# Patient Record
Sex: FEMALE | Race: WHITE | NOT HISPANIC OR LATINO | Employment: OTHER | URBAN - METROPOLITAN AREA
[De-identification: names, ages, dates, MRNs, and addresses within clinical notes are randomized per-mention and may not be internally consistent; named-entity substitution may affect disease eponyms.]

---

## 2017-01-04 ENCOUNTER — TELEPHONE (OUTPATIENT)
Dept: ONCOLOGY | Facility: CLINIC | Age: 57
End: 2017-01-04

## 2017-01-04 NOTE — TELEPHONE ENCOUNTER
Pt's daughter called to ask about a TAR 3 application for her mother.  They also need a SNAPCARD financial packet for help with medical bills.  I added a social work appt. On 1/13 to help with these applications.  The patient's daughter said they also need colostomy supplies.  She should call home health or call our Triage nurses for information about these.

## 2017-01-11 ENCOUNTER — APPOINTMENT (OUTPATIENT)
Dept: GENERAL RADIOLOGY | Facility: HOSPITAL | Age: 57
End: 2017-01-11

## 2017-01-11 ENCOUNTER — ANESTHESIA (OUTPATIENT)
Dept: PERIOP | Facility: HOSPITAL | Age: 57
End: 2017-01-11

## 2017-01-11 ENCOUNTER — ANESTHESIA EVENT (OUTPATIENT)
Dept: PERIOP | Facility: HOSPITAL | Age: 57
End: 2017-01-11

## 2017-01-11 PROCEDURE — 25010000002 PROPOFOL 10 MG/ML EMULSION: Performed by: NURSE ANESTHETIST, CERTIFIED REGISTERED

## 2017-01-11 PROCEDURE — 25010000003 CEFAZOLIN IN DEXTROSE 2-4 GM/100ML-% SOLUTION: Performed by: SURGERY

## 2017-01-11 PROCEDURE — 25010000002 DEXAMETHASONE PER 1 MG: Performed by: NURSE ANESTHETIST, CERTIFIED REGISTERED

## 2017-01-11 PROCEDURE — 77001 FLUOROGUIDE FOR VEIN DEVICE: CPT

## 2017-01-11 PROCEDURE — 25010000002 FENTANYL CITRATE (PF) 100 MCG/2ML SOLUTION: Performed by: NURSE ANESTHETIST, CERTIFIED REGISTERED

## 2017-01-11 RX ORDER — PROPOFOL 10 MG/ML
VIAL (ML) INTRAVENOUS AS NEEDED
Status: DISCONTINUED | OUTPATIENT
Start: 2017-01-11 | End: 2017-01-11 | Stop reason: SURG

## 2017-01-11 RX ORDER — DEXAMETHASONE SODIUM PHOSPHATE 10 MG/ML
INJECTION INTRAMUSCULAR; INTRAVENOUS AS NEEDED
Status: DISCONTINUED | OUTPATIENT
Start: 2017-01-11 | End: 2017-01-11 | Stop reason: SURG

## 2017-01-11 RX ORDER — LIDOCAINE HYDROCHLORIDE 20 MG/ML
INJECTION, SOLUTION INFILTRATION; PERINEURAL AS NEEDED
Status: DISCONTINUED | OUTPATIENT
Start: 2017-01-11 | End: 2017-01-11 | Stop reason: SURG

## 2017-01-11 RX ORDER — FENTANYL CITRATE 50 UG/ML
INJECTION, SOLUTION INTRAMUSCULAR; INTRAVENOUS AS NEEDED
Status: DISCONTINUED | OUTPATIENT
Start: 2017-01-11 | End: 2017-01-11 | Stop reason: SURG

## 2017-01-11 RX ADMIN — FENTANYL CITRATE 50 MCG: 50 INJECTION INTRAMUSCULAR; INTRAVENOUS at 11:11

## 2017-01-11 RX ADMIN — LIDOCAINE HYDROCHLORIDE 60 MG: 20 INJECTION, SOLUTION INFILTRATION; PERINEURAL at 11:11

## 2017-01-11 RX ADMIN — DEXAMETHASONE SODIUM PHOSPHATE 8 MG: 10 INJECTION INTRAMUSCULAR; INTRAVENOUS at 11:15

## 2017-01-11 RX ADMIN — PROPOFOL 200 MG: 10 INJECTION, EMULSION INTRAVENOUS at 11:11

## 2017-01-11 RX ADMIN — CEFAZOLIN SODIUM 2 G: 2 INJECTION, SOLUTION INTRAVENOUS at 11:05

## 2017-01-11 NOTE — ANESTHESIA PREPROCEDURE EVALUATION
Anesthesia Evaluation     Patient summary reviewed    Airway   Mallampati: I  no difficulty expected  Dental      Pulmonary    Cardiovascular   (+) hypertension,     Rhythm: irregular  Rate: normal    Neuro/Psych  GI/Hepatic/Renal/Endo      Musculoskeletal     Abdominal    Substance History      OB/GYN          Other         Other Comment: Hb 10.3                        Anesthesia Plan    ASA 3     MAC     intravenous induction   Anesthetic plan and risks discussed with patient.  Use of blood products discussed with patient .   limited english

## 2017-01-11 NOTE — ANESTHESIA POSTPROCEDURE EVALUATION
Patient: Benigno Sisic    Procedure Summary     Date Anesthesia Start Anesthesia Stop Room / Location    01/11/17 1105 1205  [facility] / [facility]       Procedure Diagnosis Surgeon Provider    INSERTION VENOUS ACCESS DEVICE (N/A ) Rectal cancer metastasized to lung  (Rectal cancer metastasized to lung [C20, C78.00]) MD Macho Mcnair Jr., MD          Anesthesia Type: MAC  Last vitals  /81 (01/11/17 1330)    Temp      Pulse 81 (01/11/17 1330)   Resp 16 (01/11/17 1330)    SpO2 98 % (01/11/17 1330)      Post Anesthesia Care and Evaluation    Patient location during evaluation: PHASE II  Patient participation: complete - patient participated  Level of consciousness: awake  Pain score: 1  Pain management: adequate  Airway patency: patent  Anesthetic complications: No anesthetic complications  Cardiovascular status: acceptable  Respiratory status: acceptable  Hydration status: acceptable

## 2017-01-12 DIAGNOSIS — C20 MALIGNANT NEOPLASM OF RECTUM (HCC): Primary | ICD-10-CM

## 2017-01-12 NOTE — PROGRESS NOTES
Subjective       PATIENT NAME:  Benigno Quispe  YOB: 1960  PATIENT'S SEX:  female    PATIENT'S ADDRESS:  [address] [place], [place] [id number]  PROVIDER:  Jude Stewart MD    Encounter Diagnosis   Name Primary?   • Malignant neoplasm of rectum Yes     No Known Allergies      [facility] INFUSION ORDERS    DATE      1/13/2017    Continuous 5FU (Fluorouracil) infusion    Dose         2125 mg /     23 hrs    Total Dose      4250 mg /      46 hrs    Repeat every  2 weeks. Total of 12 cycles currently ordered.      Noy Gutierrez RN     Electronically Signed By: Jude Stewart MD    January 12, 2017  12:52 PM

## 2017-01-13 ENCOUNTER — DOCUMENTATION (OUTPATIENT)
Dept: ONCOLOGY | Facility: CLINIC | Age: 57
End: 2017-01-13

## 2017-01-13 ENCOUNTER — OFFICE VISIT (OUTPATIENT)
Dept: ONCOLOGY | Facility: CLINIC | Age: 57
End: 2017-01-13

## 2017-01-13 ENCOUNTER — APPOINTMENT (OUTPATIENT)
Dept: ONCOLOGY | Facility: HOSPITAL | Age: 57
End: 2017-01-13

## 2017-01-13 ENCOUNTER — TELEPHONE (OUTPATIENT)
Dept: ONCOLOGY | Facility: CLINIC | Age: 57
End: 2017-01-13

## 2017-01-13 ENCOUNTER — LAB (OUTPATIENT)
Dept: LAB | Facility: HOSPITAL | Age: 57
End: 2017-01-13

## 2017-01-13 ENCOUNTER — APPOINTMENT (OUTPATIENT)
Dept: ONCOLOGY | Facility: CLINIC | Age: 57
End: 2017-01-13

## 2017-01-13 VITALS
DIASTOLIC BLOOD PRESSURE: 80 MMHG | HEART RATE: 72 BPM | TEMPERATURE: 98.8 F | SYSTOLIC BLOOD PRESSURE: 112 MMHG | WEIGHT: 160.2 LBS | RESPIRATION RATE: 16 BRPM | HEIGHT: 64 IN | BODY MASS INDEX: 27.35 KG/M2

## 2017-01-13 DIAGNOSIS — C20 MALIGNANT NEOPLASM OF RECTUM (HCC): Primary | ICD-10-CM

## 2017-01-13 DIAGNOSIS — C20 MALIGNANT NEOPLASM OF RECTUM (HCC): ICD-10-CM

## 2017-01-13 LAB
BASOPHILS # BLD AUTO: 0.02 10*3/MM3 (ref 0–0.1)
BASOPHILS NFR BLD AUTO: 0.4 % (ref 0–1.1)
BILIRUB UR QL STRIP: NEGATIVE
CLARITY UR: CLEAR
COLOR UR: YELLOW
DEPRECATED RDW RBC AUTO: 50.6 FL (ref 37–49)
EOSINOPHIL # BLD AUTO: 0.03 10*3/MM3 (ref 0–0.36)
EOSINOPHIL NFR BLD AUTO: 0.6 % (ref 1–5)
ERYTHROCYTE [DISTWIDTH] IN BLOOD BY AUTOMATED COUNT: 17.4 % (ref 11.7–14.5)
GLUCOSE UR STRIP-MCNC: NEGATIVE MG/DL
HCT VFR BLD AUTO: 36.9 % (ref 34–45)
HGB BLD-MCNC: 11.6 G/DL (ref 11.5–14.9)
HGB UR QL STRIP.AUTO: NEGATIVE
IMM GRANULOCYTES # BLD: 0.04 10*3/MM3 (ref 0–0.03)
IMM GRANULOCYTES NFR BLD: 0.9 % (ref 0–0.5)
KETONES UR QL STRIP: ABNORMAL
LEUKOCYTE ESTERASE UR QL STRIP.AUTO: NEGATIVE
LYMPHOCYTES # BLD AUTO: 2.23 10*3/MM3 (ref 1–3.5)
LYMPHOCYTES NFR BLD AUTO: 48.1 % (ref 20–49)
MCH RBC QN AUTO: 25.4 PG (ref 27–33)
MCHC RBC AUTO-ENTMCNC: 31.4 G/DL (ref 32–35)
MCV RBC AUTO: 80.9 FL (ref 83–97)
MONOCYTES # BLD AUTO: 0.42 10*3/MM3 (ref 0.25–0.8)
MONOCYTES NFR BLD AUTO: 9.1 % (ref 4–12)
NEUTROPHILS # BLD AUTO: 1.9 10*3/MM3 (ref 1.5–7)
NEUTROPHILS NFR BLD AUTO: 40.9 % (ref 39–75)
NITRITE UR QL STRIP: NEGATIVE
NRBC BLD MANUAL-RTO: 0 /100 WBC (ref 0–0)
PH UR STRIP.AUTO: 6 [PH] (ref 4.5–8)
PLATELET # BLD AUTO: 181 10*3/MM3 (ref 150–375)
PMV BLD AUTO: 9.2 FL (ref 8.9–12.1)
PROT UR QL STRIP: NEGATIVE
RBC # BLD AUTO: 4.56 10*6/MM3 (ref 3.9–5)
SP GR UR STRIP: 1.02 (ref 1–1.03)
UROBILINOGEN UR QL STRIP: ABNORMAL
WBC NRBC COR # BLD: 4.64 10*3/MM3 (ref 4–10)

## 2017-01-13 PROCEDURE — 85025 COMPLETE CBC W/AUTO DIFF WBC: CPT | Performed by: INTERNAL MEDICINE

## 2017-01-13 PROCEDURE — 36415 COLL VENOUS BLD VENIPUNCTURE: CPT | Performed by: INTERNAL MEDICINE

## 2017-01-13 PROCEDURE — 81003 URINALYSIS AUTO W/O SCOPE: CPT | Performed by: INTERNAL MEDICINE

## 2017-01-13 PROCEDURE — 99215 OFFICE O/P EST HI 40 MIN: CPT | Performed by: INTERNAL MEDICINE

## 2017-01-13 RX ORDER — FLUOROURACIL 50 MG/ML
400 INJECTION, SOLUTION INTRAVENOUS ONCE
Status: CANCELLED | OUTPATIENT
Start: 2017-02-10

## 2017-01-13 RX ORDER — SODIUM CHLORIDE 9 MG/ML
250 INJECTION, SOLUTION INTRAVENOUS ONCE
Status: CANCELLED | OUTPATIENT
Start: 2017-02-10

## 2017-01-13 RX ORDER — HYDROCODONE BITARTRATE AND ACETAMINOPHEN 5; 325 MG/1; MG/1
2 TABLET ORAL EVERY 6 HOURS PRN
Qty: 60 TABLET | Refills: 0 | Status: SHIPPED | OUTPATIENT
Start: 2017-01-13 | End: 2017-01-30 | Stop reason: SDUPTHER

## 2017-01-13 RX ORDER — PALONOSETRON 0.05 MG/ML
0.25 INJECTION, SOLUTION INTRAVENOUS ONCE
Status: CANCELLED | OUTPATIENT
Start: 2017-02-10

## 2017-01-13 RX ORDER — ATROPINE SULFATE 1 MG/ML
0.25 INJECTION, SOLUTION INTRAMUSCULAR; INTRAVENOUS; SUBCUTANEOUS
Status: CANCELLED | OUTPATIENT
Start: 2017-02-10

## 2017-01-13 NOTE — PROGRESS NOTES
Benigno Quispe was seen for a social work appointment today.  [facility]  had come in to see Dr. Stewart and hear his recommendations for continuing treatment of her metastatic cancer.  The patient's English- speaking son had come with her but was in the waiting room. We called him back into the examining room to hear the instructions for filling out the E paperwork to apply for a discount in [facility] bills.  Kevin appears to be 19 or 20 and speaks unaccented English.  I explained the [facility] paperwork.  Kevin and his sister who is working this morning, (She is an RN) will help his Mom fill this out.  His mom lives alone.  Her source of income is her Social Security Disability check.    We then filled out the application for [facility] 3 which was signed by the patient and Dr. Stewart and faxed to [facility], with a request to fast-track , hopefully to be approved by the time chemotherapy begins in 2 weeks. Kevin can ride with the patient to treatment but must pay $3.00/trip. The patient pays $3.00 per trip also.  Kevin can fill out the arcelia as a  and get free trips if approved. I called Kevin and relayed the above info.      I gave the patient and her son support group information.  They have contact information for Ariana Veras LCSW and me and should feel free to call us.

## 2017-01-13 NOTE — TELEPHONE ENCOUNTER
Pt's daughter called to answer questions on the [facility] application.  I had already talked to her brother and sent the arcelia to [facility].  I told her Dr. Stewart would like to talk to her, can she accompany her Mom on her next appt here?  She asked when the appointments are, thinks she can ask off for the chemotherapy ed appt on 1/18 and the 2/10 appt with Dr. Stewart.

## 2017-01-18 ENCOUNTER — OFFICE VISIT (OUTPATIENT)
Dept: ONCOLOGY | Facility: CLINIC | Age: 57
End: 2017-01-18

## 2017-01-18 ENCOUNTER — APPOINTMENT (OUTPATIENT)
Dept: LAB | Facility: HOSPITAL | Age: 57
End: 2017-01-18

## 2017-01-18 VITALS — WEIGHT: 153.3 LBS | BODY MASS INDEX: 26.5 KG/M2

## 2017-01-18 DIAGNOSIS — C20 MALIGNANT NEOPLASM OF RECTUM (HCC): ICD-10-CM

## 2017-01-18 DIAGNOSIS — C20 MALIGNANT NEOPLASM OF RECTUM (HCC): Primary | ICD-10-CM

## 2017-01-18 PROCEDURE — 99215 OFFICE O/P EST HI 40 MIN: CPT | Performed by: NURSE PRACTITIONER

## 2017-01-18 PROCEDURE — G0463 HOSPITAL OUTPT CLINIC VISIT: HCPCS | Performed by: NURSE PRACTITIONER

## 2017-01-18 RX ORDER — ONDANSETRON HYDROCHLORIDE 8 MG/1
8 TABLET, FILM COATED ORAL EVERY 8 HOURS PRN
Qty: 30 TABLET | Refills: 5 | Status: SHIPPED | OUTPATIENT
Start: 2017-01-18 | End: 2017-09-25 | Stop reason: SDUPTHER

## 2017-01-24 DIAGNOSIS — C20 MALIGNANT NEOPLASM OF RECTUM (HCC): ICD-10-CM

## 2017-01-26 DIAGNOSIS — C20 MALIGNANT NEOPLASM OF RECTUM (HCC): ICD-10-CM

## 2017-01-27 ENCOUNTER — INFUSION (OUTPATIENT)
Dept: ONCOLOGY | Facility: HOSPITAL | Age: 57
End: 2017-01-27

## 2017-01-27 VITALS
WEIGHT: 155 LBS | BODY MASS INDEX: 26.79 KG/M2 | HEART RATE: 98 BPM | DIASTOLIC BLOOD PRESSURE: 84 MMHG | SYSTOLIC BLOOD PRESSURE: 133 MMHG | TEMPERATURE: 98.6 F

## 2017-01-27 DIAGNOSIS — C20 MALIGNANT NEOPLASM OF RECTUM (HCC): ICD-10-CM

## 2017-01-27 LAB
ALBUMIN SERPL-MCNC: 4.1 G/DL (ref 3.5–5.2)
ALBUMIN/GLOB SERPL: 1.2 G/DL (ref 1.1–2.4)
ALP SERPL-CCNC: 54 U/L (ref 38–116)
ALT SERPL W P-5'-P-CCNC: 23 U/L (ref 0–33)
ANION GAP SERPL CALCULATED.3IONS-SCNC: 11.5 MMOL/L
AST SERPL-CCNC: 31 U/L (ref 0–32)
BASOPHILS # BLD AUTO: 0.02 10*3/MM3 (ref 0–0.1)
BASOPHILS NFR BLD AUTO: 0.5 % (ref 0–1.1)
BILIRUB SERPL-MCNC: 0.7 MG/DL (ref 0.1–1.2)
BUN BLD-MCNC: 12 MG/DL (ref 6–20)
BUN/CREAT SERPL: 17.6 (ref 7.3–30)
CALCIUM SPEC-SCNC: 9.7 MG/DL (ref 8.5–10.2)
CHLORIDE SERPL-SCNC: 100 MMOL/L (ref 98–107)
CO2 SERPL-SCNC: 29.5 MMOL/L (ref 22–29)
CREAT BLD-MCNC: 0.68 MG/DL (ref 0.6–1.1)
DEPRECATED RDW RBC AUTO: 45.6 FL (ref 37–49)
EOSINOPHIL # BLD AUTO: 0.06 10*3/MM3 (ref 0–0.36)
EOSINOPHIL NFR BLD AUTO: 1.6 % (ref 1–5)
ERYTHROCYTE [DISTWIDTH] IN BLOOD BY AUTOMATED COUNT: 15.6 % (ref 11.7–14.5)
GFR SERPL CREATININE-BSD FRML MDRD: 108 ML/MIN/1.73
GFR SERPL CREATININE-BSD FRML MDRD: 90 ML/MIN/1.73
GLOBULIN UR ELPH-MCNC: 3.3 GM/DL (ref 1.8–3.5)
GLUCOSE BLD-MCNC: 99 MG/DL (ref 74–124)
HCT VFR BLD AUTO: 39.3 % (ref 34–45)
HGB BLD-MCNC: 12.3 G/DL (ref 11.5–14.9)
IMM GRANULOCYTES # BLD: 0 10*3/MM3 (ref 0–0.03)
IMM GRANULOCYTES NFR BLD: 0 % (ref 0–0.5)
LYMPHOCYTES # BLD AUTO: 2.11 10*3/MM3 (ref 1–3.5)
LYMPHOCYTES NFR BLD AUTO: 57.5 % (ref 20–49)
MCH RBC QN AUTO: 25.3 PG (ref 27–33)
MCHC RBC AUTO-ENTMCNC: 31.3 G/DL (ref 32–35)
MCV RBC AUTO: 80.7 FL (ref 83–97)
MONOCYTES # BLD AUTO: 0.3 10*3/MM3 (ref 0.25–0.8)
MONOCYTES NFR BLD AUTO: 8.2 % (ref 4–12)
NEUTROPHILS # BLD AUTO: 1.18 10*3/MM3 (ref 1.5–7)
NEUTROPHILS NFR BLD AUTO: 32.2 % (ref 39–75)
NRBC BLD MANUAL-RTO: 0 /100 WBC (ref 0–0)
PLATELET # BLD AUTO: 192 10*3/MM3 (ref 150–375)
PMV BLD AUTO: 8.8 FL (ref 8.9–12.1)
POTASSIUM BLD-SCNC: 4.1 MMOL/L (ref 3.5–4.7)
PROT SERPL-MCNC: 7.4 G/DL (ref 6.3–8)
RBC # BLD AUTO: 4.87 10*6/MM3 (ref 3.9–5)
SODIUM BLD-SCNC: 141 MMOL/L (ref 134–145)
WBC NRBC COR # BLD: 3.67 10*3/MM3 (ref 4–10)

## 2017-01-27 PROCEDURE — 85025 COMPLETE CBC W/AUTO DIFF WBC: CPT

## 2017-01-27 PROCEDURE — 25010000003 HEPARIN LOCK FLUCH PER 10 UNITS: Performed by: INTERNAL MEDICINE

## 2017-01-27 PROCEDURE — 36591 DRAW BLOOD OFF VENOUS DEVICE: CPT

## 2017-01-27 PROCEDURE — 80053 COMPREHEN METABOLIC PANEL: CPT

## 2017-01-27 RX ORDER — HEPARIN SODIUM (PORCINE) LOCK FLUSH IV SOLN 100 UNIT/ML 100 UNIT/ML
500 SOLUTION INTRAVENOUS EVERY 8 HOURS SCHEDULED
Status: ACTIVE | OUTPATIENT
Start: 2017-01-27

## 2017-01-27 RX ADMIN — HEPARIN SODIUM (PORCINE) LOCK FLUSH IV SOLN 100 UNIT/ML 500 UNITS: 100 SOLUTION at 10:24

## 2017-01-27 NOTE — PROGRESS NOTES
Patient comes today, tearful.  Per , patient does not want chemotherapy.  Patients daughter is present today as well.  Patient states via  that she will take chemo if guaranteed to cure cancer.  Explained to patient that she has disease that has progressed from colon to her lungs and that her cancer is not curable, but it is treatable.   Patient does not want to do chemotherapy today.  Anc is low as well, in chemo naive patient.  No c/o infection.      Reviewed with dr la, ok to hold chemo today and d/c to home.  Per dr la, patient can return in 2 weeks.  (patient has metastatic disease, non emergent)    Patient did speak with another individual  in the office today who is currently taking folfiri.  Patient did appear less distressed after speaking with this patient.  She was even smiling.      Patient via in the  states she would prefer to have a female doctor and asked if she could be set up an appt with Dr. Paez, by name.   Reviewed with dr paez, she will see patient.  Set up for 3 unit appt.  andres noted the appt line that patient requests to have Сергей, the interpretor, here for her appt.      Reviewed the above situation with Fani, clinic manager.

## 2017-01-30 ENCOUNTER — TELEPHONE (OUTPATIENT)
Dept: ONCOLOGY | Facility: HOSPITAL | Age: 57
End: 2017-01-30

## 2017-01-30 RX ORDER — HYDROCODONE BITARTRATE AND ACETAMINOPHEN 5; 325 MG/1; MG/1
2 TABLET ORAL EVERY 6 HOURS PRN
Qty: 60 TABLET | Refills: 0 | Status: SHIPPED | OUTPATIENT
Start: 2017-01-30 | End: 2017-02-10 | Stop reason: SDUPTHER

## 2017-01-30 NOTE — TELEPHONE ENCOUNTER
Prescription signed for by Dr. Stewart. Called pt daughter and informed her it was ready for . She V/U.

## 2017-01-30 NOTE — TELEPHONE ENCOUNTER
----- Message from Yuli Che sent at 1/30/2017  2:59 PM EST -----   Pt's daughter is calling for a refill for hydrocodone.  Please call when ready          Darlene  [phone]

## 2017-02-06 DIAGNOSIS — C20 MALIGNANT NEOPLASM OF RECTUM (HCC): ICD-10-CM

## 2017-02-10 ENCOUNTER — INFUSION (OUTPATIENT)
Dept: ONCOLOGY | Facility: HOSPITAL | Age: 57
End: 2017-02-10

## 2017-02-10 ENCOUNTER — OFFICE VISIT (OUTPATIENT)
Dept: ONCOLOGY | Facility: CLINIC | Age: 57
End: 2017-02-10

## 2017-02-10 VITALS
RESPIRATION RATE: 16 BRPM | BODY MASS INDEX: 26.98 KG/M2 | SYSTOLIC BLOOD PRESSURE: 142 MMHG | WEIGHT: 158 LBS | TEMPERATURE: 98.4 F | DIASTOLIC BLOOD PRESSURE: 80 MMHG | HEIGHT: 64 IN | HEART RATE: 90 BPM | OXYGEN SATURATION: 99 %

## 2017-02-10 DIAGNOSIS — C20 MALIGNANT NEOPLASM OF RECTUM (HCC): ICD-10-CM

## 2017-02-10 DIAGNOSIS — D70.9 NEUTROPENIA, UNSPECIFIED TYPE (HCC): ICD-10-CM

## 2017-02-10 DIAGNOSIS — D50.9 IRON DEFICIENCY ANEMIA, UNSPECIFIED IRON DEFICIENCY ANEMIA TYPE: ICD-10-CM

## 2017-02-10 DIAGNOSIS — C20 MALIGNANT NEOPLASM OF RECTUM (HCC): Primary | ICD-10-CM

## 2017-02-10 PROBLEM — D51.9 VITAMIN B12 DEFICIENCY ANEMIA: Status: ACTIVE | Noted: 2017-02-10

## 2017-02-10 LAB
ALBUMIN SERPL-MCNC: 4.1 G/DL (ref 3.5–5.2)
ALBUMIN/GLOB SERPL: 1.4 G/DL (ref 1.1–2.4)
ALP SERPL-CCNC: 51 U/L (ref 38–116)
ALT SERPL W P-5'-P-CCNC: 20 U/L (ref 0–33)
ANION GAP SERPL CALCULATED.3IONS-SCNC: 12.3 MMOL/L
AST SERPL-CCNC: 29 U/L (ref 0–32)
BASOPHILS # BLD AUTO: 0.02 10*3/MM3 (ref 0–0.1)
BASOPHILS NFR BLD AUTO: 0.6 % (ref 0–1.1)
BILIRUB SERPL-MCNC: 0.6 MG/DL (ref 0.1–1.2)
BILIRUB UR QL STRIP: NEGATIVE
BUN BLD-MCNC: 13 MG/DL (ref 6–20)
BUN/CREAT SERPL: 20 (ref 7.3–30)
CALCIUM SPEC-SCNC: 9.5 MG/DL (ref 8.5–10.2)
CHLORIDE SERPL-SCNC: 102 MMOL/L (ref 98–107)
CLARITY UR: CLEAR
CO2 SERPL-SCNC: 26.7 MMOL/L (ref 22–29)
COLOR UR: YELLOW
CREAT BLD-MCNC: 0.65 MG/DL (ref 0.6–1.1)
DEPRECATED RDW RBC AUTO: 43 FL (ref 37–49)
EOSINOPHIL # BLD AUTO: 0.08 10*3/MM3 (ref 0–0.36)
EOSINOPHIL NFR BLD AUTO: 2.3 % (ref 1–5)
ERYTHROCYTE [DISTWIDTH] IN BLOOD BY AUTOMATED COUNT: 15.1 % (ref 11.7–14.5)
FERRITIN SERPL-MCNC: 35.2 NG/ML (ref 11–207)
FOLATE SERPL-MCNC: 11.07 NG/ML (ref 4.78–24.2)
GFR SERPL CREATININE-BSD FRML MDRD: 114 ML/MIN/1.73
GFR SERPL CREATININE-BSD FRML MDRD: 94 ML/MIN/1.73
GLOBULIN UR ELPH-MCNC: 3 GM/DL (ref 1.8–3.5)
GLUCOSE BLD-MCNC: 104 MG/DL (ref 74–124)
GLUCOSE UR STRIP-MCNC: NEGATIVE MG/DL
HCT VFR BLD AUTO: 36.4 % (ref 34–45)
HGB BLD-MCNC: 12.1 G/DL (ref 11.5–14.9)
HGB UR QL STRIP.AUTO: ABNORMAL
IMM GRANULOCYTES # BLD: 0.01 10*3/MM3 (ref 0–0.03)
IMM GRANULOCYTES NFR BLD: 0.3 % (ref 0–0.5)
IRON 24H UR-MRATE: 74 MCG/DL (ref 37–145)
IRON SATN MFR SERPL: 20 % (ref 14–48)
KETONES UR QL STRIP: NEGATIVE
LEUKOCYTE ESTERASE UR QL STRIP.AUTO: ABNORMAL
LYMPHOCYTES # BLD AUTO: 1.89 10*3/MM3 (ref 1–3.5)
LYMPHOCYTES NFR BLD AUTO: 53.8 % (ref 20–49)
MCH RBC QN AUTO: 26.2 PG (ref 27–33)
MCHC RBC AUTO-ENTMCNC: 33.2 G/DL (ref 32–35)
MCV RBC AUTO: 79 FL (ref 83–97)
MONOCYTES # BLD AUTO: 0.33 10*3/MM3 (ref 0.25–0.8)
MONOCYTES NFR BLD AUTO: 9.4 % (ref 4–12)
NEUTROPHILS # BLD AUTO: 1.18 10*3/MM3 (ref 1.5–7)
NEUTROPHILS NFR BLD AUTO: 33.6 % (ref 39–75)
NITRITE UR QL STRIP: NEGATIVE
NRBC BLD MANUAL-RTO: 0 /100 WBC (ref 0–0)
PH UR STRIP.AUTO: 5.5 [PH] (ref 4.5–8)
PLATELET # BLD AUTO: 173 10*3/MM3 (ref 150–375)
PMV BLD AUTO: 8.9 FL (ref 8.9–12.1)
POTASSIUM BLD-SCNC: 3.5 MMOL/L (ref 3.5–4.7)
PROT SERPL-MCNC: 7.1 G/DL (ref 6.3–8)
PROT UR QL STRIP: NEGATIVE
RBC # BLD AUTO: 4.61 10*6/MM3 (ref 3.9–5)
SODIUM BLD-SCNC: 141 MMOL/L (ref 134–145)
SP GR UR STRIP: 1.02 (ref 1–1.03)
TIBC SERPL-MCNC: 370 MCG/DL (ref 249–505)
TRANSFERRIN SERPL-MCNC: 264 MG/DL (ref 200–360)
UROBILINOGEN UR QL STRIP: ABNORMAL
VIT B12 BLD-MCNC: 256 PG/ML (ref 250–999)
WBC NRBC COR # BLD: 3.51 10*3/MM3 (ref 4–10)

## 2017-02-10 PROCEDURE — 80053 COMPREHEN METABOLIC PANEL: CPT

## 2017-02-10 PROCEDURE — 25010000002 LEUCOVORIN CALCIUM PER 50 MG: Performed by: INTERNAL MEDICINE

## 2017-02-10 PROCEDURE — 81003 URINALYSIS AUTO W/O SCOPE: CPT

## 2017-02-10 PROCEDURE — 82607 VITAMIN B-12: CPT | Performed by: INTERNAL MEDICINE

## 2017-02-10 PROCEDURE — 25010000002 PALONOSETRON PER 25 MCG: Performed by: INTERNAL MEDICINE

## 2017-02-10 PROCEDURE — 99214 OFFICE O/P EST MOD 30 MIN: CPT | Performed by: INTERNAL MEDICINE

## 2017-02-10 PROCEDURE — 96415 CHEMO IV INFUSION ADDL HR: CPT | Performed by: INTERNAL MEDICINE

## 2017-02-10 PROCEDURE — 25010000002 DEXAMETHASONE PER 1 MG: Performed by: INTERNAL MEDICINE

## 2017-02-10 PROCEDURE — 96375 TX/PRO/DX INJ NEW DRUG ADDON: CPT | Performed by: INTERNAL MEDICINE

## 2017-02-10 PROCEDURE — 96413 CHEMO IV INFUSION 1 HR: CPT | Performed by: INTERNAL MEDICINE

## 2017-02-10 PROCEDURE — 96416 CHEMO PROLONG INFUSE W/PUMP: CPT | Performed by: INTERNAL MEDICINE

## 2017-02-10 PROCEDURE — 85025 COMPLETE CBC W/AUTO DIFF WBC: CPT

## 2017-02-10 PROCEDURE — 25010000002 IRINOTECAN PER 20 MG: Performed by: INTERNAL MEDICINE

## 2017-02-10 PROCEDURE — 83540 ASSAY OF IRON: CPT | Performed by: INTERNAL MEDICINE

## 2017-02-10 PROCEDURE — 82746 ASSAY OF FOLIC ACID SERUM: CPT | Performed by: INTERNAL MEDICINE

## 2017-02-10 PROCEDURE — 36415 COLL VENOUS BLD VENIPUNCTURE: CPT | Performed by: INTERNAL MEDICINE

## 2017-02-10 PROCEDURE — 96368 THER/DIAG CONCURRENT INF: CPT | Performed by: INTERNAL MEDICINE

## 2017-02-10 PROCEDURE — 84466 ASSAY OF TRANSFERRIN: CPT | Performed by: INTERNAL MEDICINE

## 2017-02-10 PROCEDURE — 25010000002 FLUOROURACIL PER 500 MG: Performed by: INTERNAL MEDICINE

## 2017-02-10 PROCEDURE — 82728 ASSAY OF FERRITIN: CPT | Performed by: INTERNAL MEDICINE

## 2017-02-10 RX ORDER — HYDROCODONE BITARTRATE AND ACETAMINOPHEN 5; 325 MG/1; MG/1
2 TABLET ORAL EVERY 6 HOURS PRN
Qty: 60 TABLET | Refills: 0 | Status: SHIPPED | OUTPATIENT
Start: 2017-02-10 | End: 2017-02-27

## 2017-02-10 RX ORDER — SODIUM CHLORIDE 9 MG/ML
250 INJECTION, SOLUTION INTRAVENOUS ONCE
Status: COMPLETED | OUTPATIENT
Start: 2017-02-10 | End: 2017-02-10

## 2017-02-10 RX ORDER — ATROPINE SULFATE 0.4 MG/ML
0.25 AMPUL (ML) INJECTION
Status: DISCONTINUED | OUTPATIENT
Start: 2017-02-10 | End: 2017-02-13 | Stop reason: HOSPADM

## 2017-02-10 RX ORDER — PALONOSETRON 0.05 MG/ML
0.25 INJECTION, SOLUTION INTRAVENOUS ONCE
Status: COMPLETED | OUTPATIENT
Start: 2017-02-10 | End: 2017-02-10

## 2017-02-10 RX ORDER — CYANOCOBALAMIN 1000 UG/ML
1000 INJECTION, SOLUTION INTRAMUSCULAR; SUBCUTANEOUS ONCE
Status: CANCELLED | OUTPATIENT
Start: 2017-02-24

## 2017-02-10 RX ADMIN — SODIUM CHLORIDE 250 ML: 900 INJECTION, SOLUTION INTRAVENOUS at 08:58

## 2017-02-10 RX ADMIN — DEXTROSE 270 MG: 5 SOLUTION INTRAVENOUS at 10:02

## 2017-02-10 RX ADMIN — LEUCOVORIN CALCIUM 710 MG: 350 INJECTION, POWDER, LYOPHILIZED, FOR SOLUTION INTRAMUSCULAR; INTRAVENOUS at 10:01

## 2017-02-10 RX ADMIN — ATROPINE SULFATE 0.25 MG: 0.4 INJECTION, SOLUTION INTRAMUSCULAR; INTRAVENOUS; SUBCUTANEOUS at 08:58

## 2017-02-10 RX ADMIN — FLUOROURACIL 4250 MG: 50 INJECTION, SOLUTION INTRAVENOUS at 11:35

## 2017-02-10 RX ADMIN — PALONOSETRON HYDROCHLORIDE 0.25 MG: 0.25 INJECTION INTRAVENOUS at 08:58

## 2017-02-10 RX ADMIN — DEXAMETHASONE SODIUM PHOSPHATE 12 MG: 10 INJECTION INTRAMUSCULAR; INTRAVENOUS at 09:03

## 2017-02-10 NOTE — PROGRESS NOTES
0923 MARIELOS IN PHARMACY NOTIFIED THAT PATIENT WILL START CHEMO TODAY, DESPITE LOW ANC. WILL USE LOWERED DOSES OF CHEMO.         When patient was discharge from chemo area, call placed to scheduling and asked to make appt for disconnect, instead of [phone] at [phone] or thereafter.  Diamond v/u. (message relayed to andres via diamond)

## 2017-02-10 NOTE — PROGRESS NOTES
[facility] GROUP OUTPATIENT FOLLOW UP CLINIC VISIT    REASON FOR FOLLOW-UP:    Malignant neoplasm of rectum    Staging form: Colon and Rectum, AJCC V7      Pathologic: Stage IIIC (T4b, N1b, cM0) - Signed by Jude Stewart MD on 12/9/2016        Staging comments: Suspected lung metastases.        Clinical: Stage TRUDY (T4b, N1b, M1a) - Signed by Jude Stewart MD on 12/19/2016  Kras mutation positive.  BRAF negative.  microsatellite stable.      HISTORY OF PRESENT ILLNESS:  Benigno Quispe is a 56 y.o. female who returns initiation of chemotherapy. She has delayed chemotherapy for multiple reasons. This is my first time seeing her in the clinic. She is feeling well but describes occasional abd pain that improved with pain medication. She has a poor appetite and has been losing her hair. She is very anxious about chemotherapy and has been tearful about her diagnosis. She denies any fevers, chills, night sweats.     ONCOLOGIC HISTORY:     Malignant neoplasm of rectum    10/6/2016 Biopsy    Upper and lower endoscopy by Dr. Keller:  Rectal mass showing invasive moderately differentiated adenocarcinoma.        10/12/2016 Imaging    CT imaging showed bilateral pulmonary nodules with the largest in the left lower lobe measuring 11 mm, concerning for pulmonary metastases. A mass in the pelvis measured about 4 x 3 cm with a 15 mm lymph node in the right pelvis.      10/31/2016 Surgery    APR with posterior vaginectomy by Brie Clemente and Marisa Burris. Pathology showed a 5.5 cm low-grade well to moderately differentiated adenocarcinoma with 2 of 20 lymph nodes positive for metastatic disease and a positive radial margin.      10/31/2016 Imaging    PET scan:  Multiple bilateral hypermetabolic pulmonary nodules likely  represent metastases. The hypermetabolic nodes along both pelvic  sidewalls are suspected to be metastatic. The activity along the anal  canal and the activity within shotty bilateral groin nodes  "is  indeterminate.         PAST MEDICAL, SURGICAL, FAMILY, AND SOCIAL HISTORIES were reviewed with the patient and in the electronic medical record, and were updated if indicated.    ALLERGIES:  Allergies   Allergen Reactions   • Adhesive Tape        MEDICATIONS:  The medication list has been reviewed with the patient by the medical assistant, and the list has been updated in the electronic medical record, which I reviewed.  Medication dosages and frequencies were confirmed to be accurate.    REVIEW OF SYSTEMS:  Review of Systems   Constitutional: Positive for appetite change. Negative for activity change, chills, diaphoresis, fatigue, fever and unexpected weight change.   Respiratory: Negative for cough, chest tightness and shortness of breath.    Cardiovascular: Negative for chest pain, palpitations and leg swelling.   Gastrointestinal: Positive for abdominal pain (rare). Negative for blood in stool, constipation, diarrhea, nausea and vomiting.   Musculoskeletal: Negative for arthralgias, joint swelling and myalgias.   Hematological: Negative for adenopathy. Does not bruise/bleed easily.         Vitals:    02/10/17 0756   BP: 142/80   Pulse: 90   Resp: 16   Temp: 98.4 °F (36.9 °C)   TempSrc: Oral   SpO2: 99%   Weight: 158 lb (71.7 kg)   Height: 63.78\" (162 cm)   PainSc: 0-No pain       PHYSICAL EXAMINATION:  GENERAL:  Well-developed, well-nourished female in no acute distress.   SKIN:  Warm, dry without rashes, purpura or petechiae.  NECK:  Supple with good range of motion; no thyromegaly  LYMPHATICS:  No cervical, supraclavicular, axillary adenopathy.  CHEST:  Lungs clear to percussion and auscultation. Good airflow. Mediport accessed.   CARDIAC:  Regular rate and rhythm without murmurs, rubs or gallops. Normal S1,S2. No edema  ABDOMEN: soft, ostomy present, nontender, normal bowel sounds  EXTREMITIES:  No clubbing, cyanosis or edema.  PSYCHIATRIC:  Normal affect and mood.        DIAGNOSTIC DATA:  Results for " orders placed or performed in visit on 02/10/17   Urinalysis   Result Value Ref Range    Color, UA Yellow Yellow, Straw    Appearance, UA Clear Clear    pH, UA 5.5 4.5 - 8.0    Specific Gravity, UA 1.020 1.002 - 1.030    Glucose, UA Negative Negative    Ketones, UA Negative Negative    Bilirubin, UA Negative Negative    Blood, UA Small (1+) (A) Negative    Protein, UA Negative Negative    Leuk Esterase, UA Moderate (2+) (A) Negative    Nitrite, UA Negative Negative    Urobilinogen, UA 0.2 E.U./dL 0.2 - 1.0 E.U./dL   CBC Auto Differential   Result Value Ref Range    WBC 3.51 (L) 4.00 - 10.00 10*3/mm3    RBC 4.61 3.90 - 5.00 10*6/mm3    Hemoglobin 12.1 11.5 - 14.9 g/dL    Hematocrit 36.4 34.0 - 45.0 %    MCV 79.0 (L) 83.0 - 97.0 fL    MCH 26.2 (L) 27.0 - 33.0 pg    MCHC 33.2 32.0 - 35.0 g/dL    RDW 15.1 (H) 11.7 - 14.5 %    RDW-SD 43.0 37.0 - 49.0 fl    MPV 8.9 8.9 - 12.1 fL    Platelets 173 150 - 375 10*3/mm3    Neutrophil % 33.6 (L) 39.0 - 75.0 %    Lymphocyte % 53.8 (H) 20.0 - 49.0 %    Monocyte % 9.4 4.0 - 12.0 %    Eosinophil % 2.3 1.0 - 5.0 %    Basophil % 0.6 0.0 - 1.1 %    Immature Grans % 0.3 0.0 - 0.5 %    Neutrophils, Absolute 1.18 (L) 1.50 - 7.00 10*3/mm3    Lymphocytes, Absolute 1.89 1.00 - 3.50 10*3/mm3    Monocytes, Absolute 0.33 0.25 - 0.80 10*3/mm3    Eosinophils, Absolute 0.08 0.00 - 0.36 10*3/mm3    Basophils, Absolute 0.02 0.00 - 0.10 10*3/mm3    Immature Grans, Absolute 0.01 0.00 - 0.03 10*3/mm3    nRBC 0.0 0.0 - 0.0 /100 WBC       Assessment/Plan     ASSESSMENT/PLAN:  This is a 56 y.o. female with:  1.  Metastatic rectal cancer, Kras mutated: She had a resection on 10/31/2016 by Brie Clemente and [place].  Unfortunately, PET scan shows evidence for local lydia metastases as well as bilateral pulmonary metastases.    - Start palliative FOLFIRI/Avastin. We will add Avastin at next cycle.     - Will slightly dose reduce chemotherapy for the first cycle and consider increase for additional cycles. Will  remove the 5-FU bolus and do Irinotecan at 85%.    - Plan scans after 4 cycles, but will have to be interpreted with caution since its been almost two months since her initial scans.    - I reviewed PET/CT images myself as well as with family today to show them the pulmonary mets. She understands that treatment is palliative.  2. Neutropenia before intiation of chemotherapy. Will send labs to evaluate and dose reduce palliative chemo to start.   3. Prior microcytic anemia. Improved. Will repeat iron studies  4. Cancer related pain. Refilled pain medications  5. Anxiety. Offered support  6. Poor appetite. I suspect related to anxiety.  Monitor.     In addition to initiating chemotherapy, I rediscussed prognosis, treatment goals and reviewed imaging with family and patient.   RTC in 2.5 weeks (will need to move chemo to earlier in the week for disconnect). I asked the patient to call for any questions, concerns, or new symptoms.      ADDENDUM: Vitamin B12 is low. Will start IM supplementation when here next time.

## 2017-02-12 ENCOUNTER — INFUSION (OUTPATIENT)
Dept: ONCOLOGY | Facility: HOSPITAL | Age: 57
End: 2017-02-12

## 2017-02-12 VITALS
DIASTOLIC BLOOD PRESSURE: 64 MMHG | SYSTOLIC BLOOD PRESSURE: 115 MMHG | RESPIRATION RATE: 16 BRPM | HEART RATE: 47 BPM | TEMPERATURE: 97.8 F

## 2017-02-12 DIAGNOSIS — C20 MALIGNANT NEOPLASM OF RECTUM (HCC): Primary | ICD-10-CM

## 2017-02-12 PROCEDURE — 25010000002 HEPARIN FLUSH (PORCINE) 100 UNIT/ML SOLUTION: Performed by: INTERNAL MEDICINE

## 2017-02-12 PROCEDURE — 96523 IRRIG DRUG DELIVERY DEVICE: CPT

## 2017-02-12 RX ADMIN — Medication 500 UNITS: at 11:10

## 2017-02-13 LAB — REF LAB TEST METHOD: NORMAL

## 2017-02-27 ENCOUNTER — INFUSION (OUTPATIENT)
Dept: ONCOLOGY | Facility: HOSPITAL | Age: 57
End: 2017-02-27

## 2017-02-27 ENCOUNTER — OFFICE VISIT (OUTPATIENT)
Dept: ONCOLOGY | Facility: CLINIC | Age: 57
End: 2017-02-27

## 2017-02-27 VITALS
HEIGHT: 64 IN | OXYGEN SATURATION: 100 % | HEART RATE: 110 BPM | RESPIRATION RATE: 12 BRPM | DIASTOLIC BLOOD PRESSURE: 78 MMHG | WEIGHT: 157.6 LBS | TEMPERATURE: 98.2 F | SYSTOLIC BLOOD PRESSURE: 126 MMHG | BODY MASS INDEX: 26.91 KG/M2

## 2017-02-27 DIAGNOSIS — C20 MALIGNANT NEOPLASM OF RECTUM (HCC): ICD-10-CM

## 2017-02-27 DIAGNOSIS — C20 MALIGNANT NEOPLASM OF RECTUM (HCC): Primary | ICD-10-CM

## 2017-02-27 DIAGNOSIS — G89.3 CANCER ASSOCIATED PAIN: ICD-10-CM

## 2017-02-27 LAB
ALBUMIN SERPL-MCNC: 4.3 G/DL (ref 3.5–5.2)
ALBUMIN/GLOB SERPL: 1.5 G/DL (ref 1.1–2.4)
ALP SERPL-CCNC: 53 U/L (ref 38–116)
ALT SERPL W P-5'-P-CCNC: 19 U/L (ref 0–33)
ANION GAP SERPL CALCULATED.3IONS-SCNC: 10.3 MMOL/L
AST SERPL-CCNC: 25 U/L (ref 0–32)
BASOPHILS # BLD AUTO: 0.02 10*3/MM3 (ref 0–0.1)
BASOPHILS NFR BLD AUTO: 0.7 % (ref 0–1.1)
BILIRUB SERPL-MCNC: 0.5 MG/DL (ref 0.1–1.2)
BILIRUB UR QL STRIP: NEGATIVE
BUN BLD-MCNC: 12 MG/DL (ref 6–20)
BUN/CREAT SERPL: 16.9 (ref 7.3–30)
CALCIUM SPEC-SCNC: 9.3 MG/DL (ref 8.5–10.2)
CHLORIDE SERPL-SCNC: 103 MMOL/L (ref 98–107)
CLARITY UR: ABNORMAL
CO2 SERPL-SCNC: 27.7 MMOL/L (ref 22–29)
COLOR UR: YELLOW
CREAT BLD-MCNC: 0.71 MG/DL (ref 0.6–1.1)
DEPRECATED RDW RBC AUTO: 43.8 FL (ref 37–49)
EOSINOPHIL # BLD AUTO: 0.09 10*3/MM3 (ref 0–0.36)
EOSINOPHIL NFR BLD AUTO: 3.1 % (ref 1–5)
ERYTHROCYTE [DISTWIDTH] IN BLOOD BY AUTOMATED COUNT: 15.3 % (ref 11.7–14.5)
GFR SERPL CREATININE-BSD FRML MDRD: 103 ML/MIN/1.73
GFR SERPL CREATININE-BSD FRML MDRD: 85 ML/MIN/1.73
GLOBULIN UR ELPH-MCNC: 2.9 GM/DL (ref 1.8–3.5)
GLUCOSE BLD-MCNC: 108 MG/DL (ref 74–124)
GLUCOSE UR STRIP-MCNC: NEGATIVE MG/DL
HCT VFR BLD AUTO: 36.6 % (ref 34–45)
HGB BLD-MCNC: 12.2 G/DL (ref 11.5–14.9)
HGB UR QL STRIP.AUTO: ABNORMAL
IMM GRANULOCYTES # BLD: 0.02 10*3/MM3 (ref 0–0.03)
IMM GRANULOCYTES NFR BLD: 0.7 % (ref 0–0.5)
KETONES UR QL STRIP: ABNORMAL
LEUKOCYTE ESTERASE UR QL STRIP.AUTO: ABNORMAL
LYMPHOCYTES # BLD AUTO: 1.53 10*3/MM3 (ref 1–3.5)
LYMPHOCYTES NFR BLD AUTO: 52.9 % (ref 20–49)
MCH RBC QN AUTO: 26.6 PG (ref 27–33)
MCHC RBC AUTO-ENTMCNC: 33.3 G/DL (ref 32–35)
MCV RBC AUTO: 79.9 FL (ref 83–97)
MONOCYTES # BLD AUTO: 0.29 10*3/MM3 (ref 0.25–0.8)
MONOCYTES NFR BLD AUTO: 10 % (ref 4–12)
NEUTROPHILS # BLD AUTO: 0.94 10*3/MM3 (ref 1.5–7)
NEUTROPHILS NFR BLD AUTO: 32.6 % (ref 39–75)
NITRITE UR QL STRIP: NEGATIVE
NRBC BLD MANUAL-RTO: 0 /100 WBC (ref 0–0)
PH UR STRIP.AUTO: 5.5 [PH] (ref 4.5–8)
PLATELET # BLD AUTO: 213 10*3/MM3 (ref 150–375)
PMV BLD AUTO: 8.8 FL (ref 8.9–12.1)
POTASSIUM BLD-SCNC: 3.9 MMOL/L (ref 3.5–4.7)
PROT SERPL-MCNC: 7.2 G/DL (ref 6.3–8)
PROT UR QL STRIP: NEGATIVE
RBC # BLD AUTO: 4.58 10*6/MM3 (ref 3.9–5)
SODIUM BLD-SCNC: 141 MMOL/L (ref 134–145)
SP GR UR STRIP: 1.02 (ref 1–1.03)
UROBILINOGEN UR QL STRIP: ABNORMAL
WBC NRBC COR # BLD: 2.89 10*3/MM3 (ref 4–10)

## 2017-02-27 PROCEDURE — 85025 COMPLETE CBC W/AUTO DIFF WBC: CPT

## 2017-02-27 PROCEDURE — 80053 COMPREHEN METABOLIC PANEL: CPT

## 2017-02-27 PROCEDURE — 81003 URINALYSIS AUTO W/O SCOPE: CPT

## 2017-02-27 PROCEDURE — 96372 THER/PROPH/DIAG INJ SC/IM: CPT | Performed by: INTERNAL MEDICINE

## 2017-02-27 PROCEDURE — 25010000003 HEPARIN LOCK FLUCH PER 10 UNITS: Performed by: INTERNAL MEDICINE

## 2017-02-27 PROCEDURE — 25010000002 CYANOCOBALAMIN PER 1000 MCG: Performed by: INTERNAL MEDICINE

## 2017-02-27 PROCEDURE — 99214 OFFICE O/P EST MOD 30 MIN: CPT | Performed by: INTERNAL MEDICINE

## 2017-02-27 PROCEDURE — 36415 COLL VENOUS BLD VENIPUNCTURE: CPT

## 2017-02-27 RX ORDER — CYANOCOBALAMIN 1000 UG/ML
1000 INJECTION, SOLUTION INTRAMUSCULAR; SUBCUTANEOUS
Status: DISCONTINUED | OUTPATIENT
Start: 2017-02-27 | End: 2017-02-27 | Stop reason: HOSPADM

## 2017-02-27 RX ORDER — CITALOPRAM 20 MG/1
20 TABLET ORAL EVERY MORNING
Qty: 30 TABLET | Refills: 5 | Status: SHIPPED | OUTPATIENT
Start: 2017-02-27 | End: 2018-01-31

## 2017-02-27 RX ORDER — LORATADINE 10 MG/1
10 TABLET ORAL DAILY
COMMUNITY
End: 2018-01-31

## 2017-02-27 RX ORDER — HYDROCODONE BITARTRATE AND ACETAMINOPHEN 10; 325 MG/1; MG/1
1 TABLET ORAL EVERY 6 HOURS PRN
Qty: 120 TABLET | Refills: 0 | Status: SHIPPED | OUTPATIENT
Start: 2017-02-27 | End: 2017-05-01 | Stop reason: SDUPTHER

## 2017-02-27 RX ORDER — HEPARIN SODIUM (PORCINE) LOCK FLUSH IV SOLN 100 UNIT/ML 100 UNIT/ML
500 SOLUTION INTRAVENOUS EVERY 8 HOURS SCHEDULED
Status: ACTIVE | OUTPATIENT
Start: 2017-02-27

## 2017-02-27 RX ADMIN — CYANOCOBALAMIN 1000 MCG: 1000 INJECTION, SOLUTION INTRAMUSCULAR; SUBCUTANEOUS at 14:27

## 2017-02-27 RX ADMIN — SODIUM CHLORIDE, PRESERVATIVE FREE 500 UNITS: 5 INJECTION INTRAVENOUS at 14:27

## 2017-02-27 NOTE — PROGRESS NOTES
[facility] GROUP OUTPATIENT FOLLOW UP CLINIC VISIT    REASON FOR FOLLOW-UP:    Malignant neoplasm of rectum    Staging form: Colon and Rectum, AJCC V7      Pathologic: Stage IIIC (T4b, N1b, cM0) - Signed by Jude Stewart MD on 12/9/2016        Staging comments: Suspected lung metastases.        Clinical: Stage TRUDY (T4b, N1b, M1a) - Signed by Jude Stewart MD on 12/19/2016  Kras mutation positive.  BRAF negative.  microsatellite stable.      HISTORY OF PRESENT ILLNESS:  Benigno Quispe is a 56 y.o. female who returns for cycle 2 chemotherapy. She did well with cycle 1 but describes rectal fullness and mild confusion when she was disconnected from the 5-FU. She reports that these symptoms were very short lived, only lasting minutes. She is tearful and anxious. Her father is sick in [facility] and that stresses her out. No fevers, chills, night sweats. Normal bowel movements.     ONCOLOGIC HISTORY:     Malignant neoplasm of rectum    10/6/2016 Biopsy    Upper and lower endoscopy by Dr. Keller:  Rectal mass showing invasive moderately differentiated adenocarcinoma.        10/12/2016 Imaging    CT imaging showed bilateral pulmonary nodules with the largest in the left lower lobe measuring 11 mm, concerning for pulmonary metastases. A mass in the pelvis measured about 4 x 3 cm with a 15 mm lymph node in the right pelvis.      10/31/2016 Surgery    APR with posterior vaginectomy by Brie Clemente and Marisa Burris. Pathology showed a 5.5 cm low-grade well to moderately differentiated adenocarcinoma with 2 of 20 lymph nodes positive for metastatic disease and a positive radial margin.      10/31/2016 Imaging    PET scan:  Multiple bilateral hypermetabolic pulmonary nodules likely  represent metastases. The hypermetabolic nodes along both pelvic  sidewalls are suspected to be metastatic. The activity along the anal  canal and the activity within shotty bilateral groin nodes is  indeterminate.      2/13/2017 -   "Chemotherapy    OP COLORECTAL FOLFIRI + Bevacizumab (Irinotecan / Leucovorin / Fluorouracil / Bevacizumab)           PAST MEDICAL, SURGICAL, FAMILY, AND SOCIAL HISTORIES were reviewed with the patient and in the electronic medical record, and were updated if indicated.    ALLERGIES:  Allergies   Allergen Reactions   • Adhesive Tape        MEDICATIONS:  The medication list has been reviewed with the patient by the medical assistant, and the list has been updated in the electronic medical record, which I reviewed.  Medication dosages and frequencies were confirmed to be accurate.    REVIEW OF SYSTEMS:  Review of Systems   Constitutional: Positive for appetite change. Negative for activity change, chills, diaphoresis, fatigue, fever and unexpected weight change.   Respiratory: Negative for cough, chest tightness and shortness of breath.    Cardiovascular: Negative for chest pain, palpitations and leg swelling.   Gastrointestinal: Positive for abdominal pain (rare). Negative for blood in stool, constipation, diarrhea, nausea and vomiting.   Endocrine: Negative.    Genitourinary: Negative.    Musculoskeletal: Negative for arthralgias, joint swelling and myalgias.   Allergic/Immunologic: Negative.    Neurological: Negative.    Hematological: Negative for adenopathy. Does not bruise/bleed easily.   Psychiatric/Behavioral: Positive for hallucinations. The patient is nervous/anxious.      Objective     Vitals:    02/27/17 1251   BP: 126/78   Pulse: 110   Resp: 12   Temp: 98.2 °F (36.8 °C)   TempSrc: Oral   SpO2: 100%   Weight: 157 lb 9.6 oz (71.5 kg)   Height: 63.78\" (162 cm)   PainSc: 4  Comment: pain when sitting from surgical site     GENERAL:  Well-developed, well-nourished female in no acute distress.   SKIN:  Warm, dry without rashes, purpura or petechiae.  NECK:  Supple with good range of motion; no thyromegaly  CHEST:  Lungs clear to percussion and auscultation. Good airflow. Mediport accessed.   CARDIAC:  Regular " rate and rhythm without murmurs, rubs or gallops. Normal S1,S2. No edema  EXTREMITIES:  No clubbing, cyanosis or edema.  PSYCHIATRIC:  tearful    DIAGNOSTIC DATA:  Results for orders placed or performed in visit on 02/27/17   CBC Auto Differential   Result Value Ref Range    WBC 2.89 (L) 4.00 - 10.00 10*3/mm3    RBC 4.58 3.90 - 5.00 10*6/mm3    Hemoglobin 12.2 11.5 - 14.9 g/dL    Hematocrit 36.6 34.0 - 45.0 %    MCV 79.9 (L) 83.0 - 97.0 fL    MCH 26.6 (L) 27.0 - 33.0 pg    MCHC 33.3 32.0 - 35.0 g/dL    RDW 15.3 (H) 11.7 - 14.5 %    RDW-SD 43.8 37.0 - 49.0 fl    MPV 8.8 (L) 8.9 - 12.1 fL    Platelets 213 150 - 375 10*3/mm3    Neutrophil % 32.6 (L) 39.0 - 75.0 %    Lymphocyte % 52.9 (H) 20.0 - 49.0 %    Monocyte % 10.0 4.0 - 12.0 %    Eosinophil % 3.1 1.0 - 5.0 %    Basophil % 0.7 0.0 - 1.1 %    Immature Grans % 0.7 (H) 0.0 - 0.5 %    Neutrophils, Absolute 0.94 (L) 1.50 - 7.00 10*3/mm3    Lymphocytes, Absolute 1.53 1.00 - 3.50 10*3/mm3    Monocytes, Absolute 0.29 0.25 - 0.80 10*3/mm3    Eosinophils, Absolute 0.09 0.00 - 0.36 10*3/mm3    Basophils, Absolute 0.02 0.00 - 0.10 10*3/mm3    Immature Grans, Absolute 0.02 0.00 - 0.03 10*3/mm3    nRBC 0.0 0.0 - 0.0 /100 WBC     Results for [facility] (MRN [id number]) as of 2/27/2017 12:51   Ref. Range 2/10/2017 09:42   Iron Latest Ref Range: 37 - 145 mcg/dL 74   Ferritin Latest Ref Range: 11.00 - 207.00 ng/mL 35.20   Iron Saturation Latest Ref Range: 14 - 48 % 20   Transferrin Latest Ref Range: 200 - 360 mg/dL 264   TIBC Latest Ref Range: 249 - 505 mcg/dL 370   Folate Latest Ref Range: 4.78 - 24.20 ng/mL 11.07   Vitamin B-12 Latest Ref Range: 250 - 999 pg/mL 256     Assessment/Plan     ASSESSMENT/PLAN:  This is a 56 y.o. female with:  1.  Metastatic rectal cancer, Kras mutated: She had a resection on 10/31/2016 by Brie Clemente and [place].  Unfortunately, PET scan shows evidence for local lydia metastases as well as bilateral pulmonary metastases.    - Palliative  FOLFIRI/Avastin started 2/10/17. Add Avastin with next cycle.    - Chemotherapy was dose reduced (no 5-FU bolus and Irinotecan at 85%) for the first cycle due to neutropenia    - Will have to delay chemotherapy, but will go on and start B12 injections today. Bring back in 1 week for chemo/Avastin.    - Plan scans after 4 cycles, but will have to be interpreted with caution since its been almost two months since her initial scans.     2. Vitamin B12 deficiency. Start B12 injection today and she will also start oral B12.   3. Neutropenia before intiation of chemotherapy. Likely secondary to B12 deficiency and no chemotherapy  4. Cancer related pain. Refilled pain medications, but adjusted to Norco 10 mg every 6 hrs prn.   5. Anxiety. Celexa 20 mg daily. She wasn't previously taking this.   6. Hallucinations. Unclear etiology. Occurred when she was being unhooked. Monitor. Could have been anxiety related. Very short lived.     RTC in 1 week. I asked the patient to call for any questions, concerns, or new symptoms.

## 2017-03-01 DIAGNOSIS — C20 MALIGNANT NEOPLASM OF RECTUM (HCC): ICD-10-CM

## 2017-03-06 ENCOUNTER — INFUSION (OUTPATIENT)
Dept: ONCOLOGY | Facility: HOSPITAL | Age: 57
End: 2017-03-06

## 2017-03-06 ENCOUNTER — OFFICE VISIT (OUTPATIENT)
Dept: ONCOLOGY | Facility: CLINIC | Age: 57
End: 2017-03-06

## 2017-03-06 VITALS
HEIGHT: 64 IN | SYSTOLIC BLOOD PRESSURE: 120 MMHG | BODY MASS INDEX: 27.18 KG/M2 | RESPIRATION RATE: 16 BRPM | DIASTOLIC BLOOD PRESSURE: 80 MMHG | TEMPERATURE: 98.1 F | WEIGHT: 159.2 LBS | HEART RATE: 76 BPM

## 2017-03-06 DIAGNOSIS — D70.9 NEUTROPENIA, UNSPECIFIED TYPE (HCC): ICD-10-CM

## 2017-03-06 DIAGNOSIS — C20 MALIGNANT NEOPLASM OF RECTUM (HCC): Primary | ICD-10-CM

## 2017-03-06 DIAGNOSIS — D51.9 ANEMIA DUE TO VITAMIN B12 DEFICIENCY, UNSPECIFIED B12 DEFICIENCY TYPE: ICD-10-CM

## 2017-03-06 DIAGNOSIS — C20 MALIGNANT NEOPLASM OF RECTUM (HCC): ICD-10-CM

## 2017-03-06 LAB
ALBUMIN SERPL-MCNC: 4.1 G/DL (ref 3.5–5.2)
ALBUMIN/GLOB SERPL: 1.5 G/DL (ref 1.1–2.4)
ALP SERPL-CCNC: 54 U/L (ref 38–116)
ALT SERPL W P-5'-P-CCNC: 19 U/L (ref 0–33)
ANION GAP SERPL CALCULATED.3IONS-SCNC: 11.8 MMOL/L
AST SERPL-CCNC: 20 U/L (ref 0–32)
BASOPHILS # BLD AUTO: 0.02 10*3/MM3 (ref 0–0.1)
BASOPHILS NFR BLD AUTO: 0.6 % (ref 0–1.1)
BILIRUB SERPL-MCNC: 0.5 MG/DL (ref 0.1–1.2)
BILIRUB UR QL STRIP: NEGATIVE
BUN BLD-MCNC: 12 MG/DL (ref 6–20)
BUN/CREAT SERPL: 15.8 (ref 7.3–30)
CALCIUM SPEC-SCNC: 9 MG/DL (ref 8.5–10.2)
CHLORIDE SERPL-SCNC: 101 MMOL/L (ref 98–107)
CLARITY UR: CLEAR
CO2 SERPL-SCNC: 28.2 MMOL/L (ref 22–29)
COLOR UR: YELLOW
CREAT BLD-MCNC: 0.76 MG/DL (ref 0.6–1.1)
DEPRECATED RDW RBC AUTO: 44.3 FL (ref 37–49)
EOSINOPHIL # BLD AUTO: 0.08 10*3/MM3 (ref 0–0.36)
EOSINOPHIL NFR BLD AUTO: 2.3 % (ref 1–5)
ERYTHROCYTE [DISTWIDTH] IN BLOOD BY AUTOMATED COUNT: 15.3 % (ref 11.7–14.5)
GFR SERPL CREATININE-BSD FRML MDRD: 79 ML/MIN/1.73
GFR SERPL CREATININE-BSD FRML MDRD: 95 ML/MIN/1.73
GLOBULIN UR ELPH-MCNC: 2.7 GM/DL (ref 1.8–3.5)
GLUCOSE BLD-MCNC: 117 MG/DL (ref 74–124)
GLUCOSE UR STRIP-MCNC: NEGATIVE MG/DL
HCT VFR BLD AUTO: 36 % (ref 34–45)
HGB BLD-MCNC: 11.7 G/DL (ref 11.5–14.9)
HGB UR QL STRIP.AUTO: ABNORMAL
IMM GRANULOCYTES # BLD: 0.01 10*3/MM3 (ref 0–0.03)
IMM GRANULOCYTES NFR BLD: 0.3 % (ref 0–0.5)
KETONES UR QL STRIP: NEGATIVE
LEUKOCYTE ESTERASE UR QL STRIP.AUTO: ABNORMAL
LYMPHOCYTES # BLD AUTO: 1.7 10*3/MM3 (ref 1–3.5)
LYMPHOCYTES NFR BLD AUTO: 49.7 % (ref 20–49)
MCH RBC QN AUTO: 26.4 PG (ref 27–33)
MCHC RBC AUTO-ENTMCNC: 32.5 G/DL (ref 32–35)
MCV RBC AUTO: 81.1 FL (ref 83–97)
MONOCYTES # BLD AUTO: 0.34 10*3/MM3 (ref 0.25–0.8)
MONOCYTES NFR BLD AUTO: 9.9 % (ref 4–12)
NEUTROPHILS # BLD AUTO: 1.27 10*3/MM3 (ref 1.5–7)
NEUTROPHILS NFR BLD AUTO: 37.2 % (ref 39–75)
NITRITE UR QL STRIP: NEGATIVE
NRBC BLD MANUAL-RTO: 0 /100 WBC (ref 0–0)
PH UR STRIP.AUTO: 5.5 [PH] (ref 4.5–8)
PLATELET # BLD AUTO: 181 10*3/MM3 (ref 150–375)
PMV BLD AUTO: 8.6 FL (ref 8.9–12.1)
POTASSIUM BLD-SCNC: 3.8 MMOL/L (ref 3.5–4.7)
PROT SERPL-MCNC: 6.8 G/DL (ref 6.3–8)
PROT UR QL STRIP: NEGATIVE
RBC # BLD AUTO: 4.44 10*6/MM3 (ref 3.9–5)
SODIUM BLD-SCNC: 141 MMOL/L (ref 134–145)
SP GR UR STRIP: 1.02 (ref 1–1.03)
UROBILINOGEN UR QL STRIP: ABNORMAL
WBC NRBC COR # BLD: 3.42 10*3/MM3 (ref 4–10)

## 2017-03-06 PROCEDURE — 96416 CHEMO PROLONG INFUSE W/PUMP: CPT | Performed by: INTERNAL MEDICINE

## 2017-03-06 PROCEDURE — 25010000002 BEVACIZUMAB PER 10 MG: Performed by: INTERNAL MEDICINE

## 2017-03-06 PROCEDURE — 25010000002 IRINOTECAN PER 20 MG: Performed by: INTERNAL MEDICINE

## 2017-03-06 PROCEDURE — 36415 COLL VENOUS BLD VENIPUNCTURE: CPT

## 2017-03-06 PROCEDURE — 25010000002 FLUOROURACIL PER 500 MG: Performed by: INTERNAL MEDICINE

## 2017-03-06 PROCEDURE — 96375 TX/PRO/DX INJ NEW DRUG ADDON: CPT | Performed by: INTERNAL MEDICINE

## 2017-03-06 PROCEDURE — 96413 CHEMO IV INFUSION 1 HR: CPT | Performed by: INTERNAL MEDICINE

## 2017-03-06 PROCEDURE — 80053 COMPREHEN METABOLIC PANEL: CPT

## 2017-03-06 PROCEDURE — 85025 COMPLETE CBC W/AUTO DIFF WBC: CPT

## 2017-03-06 PROCEDURE — 25010000002 DEXAMETHASONE PER 1 MG: Performed by: INTERNAL MEDICINE

## 2017-03-06 PROCEDURE — 96417 CHEMO IV INFUS EACH ADDL SEQ: CPT | Performed by: INTERNAL MEDICINE

## 2017-03-06 PROCEDURE — 99213 OFFICE O/P EST LOW 20 MIN: CPT | Performed by: INTERNAL MEDICINE

## 2017-03-06 PROCEDURE — 81003 URINALYSIS AUTO W/O SCOPE: CPT

## 2017-03-06 PROCEDURE — 96368 THER/DIAG CONCURRENT INF: CPT | Performed by: INTERNAL MEDICINE

## 2017-03-06 PROCEDURE — 25010000002 PALONOSETRON PER 25 MCG: Performed by: INTERNAL MEDICINE

## 2017-03-06 PROCEDURE — 25010000002 LEUCOVORIN CALCIUM PER 50 MG: Performed by: INTERNAL MEDICINE

## 2017-03-06 RX ORDER — ATROPINE SULFATE 1 MG/ML
0.25 INJECTION, SOLUTION INTRAMUSCULAR; INTRAVENOUS; SUBCUTANEOUS
Status: CANCELLED | OUTPATIENT
Start: 2017-03-21

## 2017-03-06 RX ORDER — SODIUM CHLORIDE 9 MG/ML
250 INJECTION, SOLUTION INTRAVENOUS ONCE
Status: CANCELLED | OUTPATIENT
Start: 2017-04-03

## 2017-03-06 RX ORDER — ATROPINE SULFATE 1 MG/ML
0.25 INJECTION, SOLUTION INTRAMUSCULAR; INTRAVENOUS; SUBCUTANEOUS
Status: CANCELLED | OUTPATIENT
Start: 2017-03-06

## 2017-03-06 RX ORDER — SODIUM CHLORIDE 9 MG/ML
250 INJECTION, SOLUTION INTRAVENOUS ONCE
Status: CANCELLED | OUTPATIENT
Start: 2017-03-06

## 2017-03-06 RX ORDER — PALONOSETRON 0.05 MG/ML
0.25 INJECTION, SOLUTION INTRAVENOUS ONCE
Status: CANCELLED | OUTPATIENT
Start: 2017-03-06

## 2017-03-06 RX ORDER — SODIUM CHLORIDE 9 MG/ML
250 INJECTION, SOLUTION INTRAVENOUS ONCE
Status: CANCELLED | OUTPATIENT
Start: 2017-03-21

## 2017-03-06 RX ORDER — PALONOSETRON 0.05 MG/ML
0.25 INJECTION, SOLUTION INTRAVENOUS ONCE
Status: COMPLETED | OUTPATIENT
Start: 2017-03-06 | End: 2017-03-06

## 2017-03-06 RX ORDER — SODIUM CHLORIDE 9 MG/ML
250 INJECTION, SOLUTION INTRAVENOUS ONCE
Status: COMPLETED | OUTPATIENT
Start: 2017-03-06 | End: 2017-03-06

## 2017-03-06 RX ORDER — PALONOSETRON 0.05 MG/ML
0.25 INJECTION, SOLUTION INTRAVENOUS ONCE
Status: CANCELLED | OUTPATIENT
Start: 2017-04-03

## 2017-03-06 RX ORDER — ATROPINE SULFATE 0.4 MG/ML
0.25 AMPUL (ML) INJECTION
Status: DISCONTINUED | OUTPATIENT
Start: 2017-03-06 | End: 2017-03-06 | Stop reason: HOSPADM

## 2017-03-06 RX ORDER — ATROPINE SULFATE 1 MG/ML
0.25 INJECTION, SOLUTION INTRAMUSCULAR; INTRAVENOUS; SUBCUTANEOUS
Status: CANCELLED | OUTPATIENT
Start: 2017-04-03

## 2017-03-06 RX ORDER — PALONOSETRON 0.05 MG/ML
0.25 INJECTION, SOLUTION INTRAVENOUS ONCE
Status: CANCELLED | OUTPATIENT
Start: 2017-03-21

## 2017-03-06 RX ADMIN — FLUOROURACIL 3400 MG: 50 INJECTION, SOLUTION INTRAVENOUS at 17:18

## 2017-03-06 RX ADMIN — ATROPINE SULFATE 0.25 MG: 0.4 INJECTION, SOLUTION INTRAMUSCULAR; INTRAVENOUS; SUBCUTANEOUS at 13:32

## 2017-03-06 RX ADMIN — DEXTROSE MONOHYDRATE 270 MG: 5 INJECTION, SOLUTION INTRAVENOUS at 15:45

## 2017-03-06 RX ADMIN — SODIUM CHLORIDE 250 ML: 900 INJECTION, SOLUTION INTRAVENOUS at 13:25

## 2017-03-06 RX ADMIN — BEVACIZUMAB 360 MG: 400 INJECTION, SOLUTION INTRAVENOUS at 14:19

## 2017-03-06 RX ADMIN — LEUCOVORIN CALCIUM 710 MG: 350 INJECTION, POWDER, LYOPHILIZED, FOR SOLUTION INTRAMUSCULAR; INTRAVENOUS at 15:45

## 2017-03-06 RX ADMIN — DEXAMETHASONE SODIUM PHOSPHATE 12 MG: 10 INJECTION INTRAMUSCULAR; INTRAVENOUS at 13:37

## 2017-03-06 RX ADMIN — PALONOSETRON HYDROCHLORIDE 0.25 MG: 0.25 INJECTION INTRAVENOUS at 13:33

## 2017-03-06 NOTE — PROGRESS NOTES
Home 5FU ball connected, continued education providing regarding chemo ball.  Pt aware to call the office for any concern, educated on how to clamp the line for any concerns.  V/U

## 2017-03-06 NOTE — PROGRESS NOTES
[facility] GROUP OUTPATIENT FOLLOW UP CLINIC VISIT    REASON FOR FOLLOW-UP:    Malignant neoplasm of rectum    Staging form: Colon and Rectum, AJCC V7      Pathologic: Stage IIIC (T4b, N1b, cM0) - Signed by Jude Stewart MD on 12/9/2016        Staging comments: Suspected lung metastases.        Clinical: Stage TRUDY (T4b, N1b, M1a) - Signed by Jude Stewart MD on 12/19/2016  Kras mutation positive.  BRAF negative.  microsatellite stable.      HISTORY OF PRESENT ILLNESS:  Benigno Quispe is a 56 y.o. female who returns for cycle 2 chemotherapy.  We held her chemotherapy last week because of neutropenia.  She was started on vitamin B12 injections and is taking 2000 MCG vitamin B12 orally every day.  She is doing well. No new complaints. Anxiety seems to be better controlled. No pain, nausea, vomiting.     ONCOLOGIC HISTORY:     Malignant neoplasm of rectum    10/6/2016 Biopsy    Upper and lower endoscopy by Dr. Keller:  Rectal mass showing invasive moderately differentiated adenocarcinoma.        10/12/2016 Imaging    CT imaging showed bilateral pulmonary nodules with the largest in the left lower lobe measuring 11 mm, concerning for pulmonary metastases. A mass in the pelvis measured about 4 x 3 cm with a 15 mm lymph node in the right pelvis.      10/31/2016 Surgery    APR with posterior vaginectomy by Brie Clemente and Marisa Burris. Pathology showed a 5.5 cm low-grade well to moderately differentiated adenocarcinoma with 2 of 20 lymph nodes positive for metastatic disease and a positive radial margin.      10/31/2016 Imaging    PET scan:  Multiple bilateral hypermetabolic pulmonary nodules likely  represent metastases. The hypermetabolic nodes along both pelvic  sidewalls are suspected to be metastatic. The activity along the anal  canal and the activity within shotty bilateral groin nodes is  indeterminate.      2/13/2017 -  Chemotherapy    OP COLORECTAL FOLFIRI + Bevacizumab (Irinotecan / Leucovorin /  "Fluorouracil / Bevacizumab)           PAST MEDICAL, SURGICAL, FAMILY, AND SOCIAL HISTORIES were reviewed with the patient and in the electronic medical record, and were updated if indicated.    ALLERGIES:  Allergies   Allergen Reactions   • Adhesive Tape        MEDICATIONS:  The medication list has been reviewed with the patient by the medical assistant, and the list has been updated in the electronic medical record, which I reviewed.  Medication dosages and frequencies were confirmed to be accurate.    REVIEW OF SYSTEMS:  Review of Systems   Constitutional: Negative for activity change, appetite change, chills, diaphoresis, fatigue, fever and unexpected weight change.   Respiratory: Negative for cough, chest tightness and shortness of breath.    Cardiovascular: Negative for chest pain, palpitations and leg swelling.   Gastrointestinal: Negative for abdominal pain, blood in stool, constipation, diarrhea, nausea and vomiting.   Endocrine: Negative.    Genitourinary: Negative.    Musculoskeletal: Negative for arthralgias, joint swelling and myalgias.   Allergic/Immunologic: Negative.    Neurological: Negative.    Hematological: Negative for adenopathy. Does not bruise/bleed easily.   Psychiatric/Behavioral: Negative for hallucinations. The patient is not nervous/anxious.      Objective     Vitals:    03/06/17 1201   BP: 120/80   Pulse: 76   Resp: 16   Temp: 98.1 °F (36.7 °C)   Weight: 159 lb 3.2 oz (72.2 kg)   Height: 63.78\" (162 cm)   PainSc: 0-No pain     GENERAL:  Well-developed, well-nourished female in no acute distress.   SKIN:  Warm, dry without rashes, purpura or petechiae.  CHEST:  Lungs clear to percussion and auscultation. Good airflow. Mediport normal.   CARDIAC:  Regular rate and rhythm without murmurs, rubs or gallops. Normal S1,S2. No edema  EXTREMITIES:  No clubbing, cyanosis or edema.  PSYCHIATRIC:  Normal affect and mood.      DIAGNOSTIC DATA:  Results for orders placed or performed in visit on " 03/06/17   Comprehensive metabolic panel   Result Value Ref Range    Glucose 117 74 - 124 mg/dL    BUN 12 6 - 20 mg/dL    Creatinine 0.76 0.60 - 1.10 mg/dL    Sodium 141 134 - 145 mmol/L    Potassium 3.8 3.5 - 4.7 mmol/L    Chloride 101 98 - 107 mmol/L    CO2 28.2 22.0 - 29.0 mmol/L    Calcium 9.0 8.5 - 10.2 mg/dL    Total Protein 6.8 6.3 - 8.0 g/dL    Albumin 4.10 3.50 - 5.20 g/dL    ALT (SGPT) 19 0 - 33 U/L    AST (SGOT) 20 0 - 32 U/L    Alkaline Phosphatase 54 38 - 116 U/L    Total Bilirubin 0.5 0.1 - 1.2 mg/dL    eGFR Non African Amer 79 >60 mL/min/1.73    eGFR  African Amer 95 >60 mL/min/1.73    Globulin 2.7 1.8 - 3.5 gm/dL    A/G Ratio 1.5 1.1 - 2.4 g/dL    BUN/Creatinine Ratio 15.8 7.3 - 30.0    Anion Gap 11.8 mmol/L   Urinalysis   Result Value Ref Range    Color, UA Yellow Yellow, Straw    Appearance, UA Clear Clear    pH, UA 5.5 4.5 - 8.0    Specific Gravity, UA 1.020 1.002 - 1.030    Glucose, UA Negative Negative    Ketones, UA Negative Negative    Bilirubin, UA Negative Negative    Blood, UA Moderate (2+) (A) Negative    Protein, UA Negative Negative    Leuk Esterase, UA Moderate (2+) (A) Negative    Nitrite, UA Negative Negative    Urobilinogen, UA 0.2 E.U./dL 0.2 - 1.0 E.U./dL   CBC Auto Differential   Result Value Ref Range    WBC 3.42 (L) 4.00 - 10.00 10*3/mm3    RBC 4.44 3.90 - 5.00 10*6/mm3    Hemoglobin 11.7 11.5 - 14.9 g/dL    Hematocrit 36.0 34.0 - 45.0 %    MCV 81.1 (L) 83.0 - 97.0 fL    MCH 26.4 (L) 27.0 - 33.0 pg    MCHC 32.5 32.0 - 35.0 g/dL    RDW 15.3 (H) 11.7 - 14.5 %    RDW-SD 44.3 37.0 - 49.0 fl    MPV 8.6 (L) 8.9 - 12.1 fL    Platelets 181 150 - 375 10*3/mm3    Neutrophil % 37.2 (L) 39.0 - 75.0 %    Lymphocyte % 49.7 (H) 20.0 - 49.0 %    Monocyte % 9.9 4.0 - 12.0 %    Eosinophil % 2.3 1.0 - 5.0 %    Basophil % 0.6 0.0 - 1.1 %    Immature Grans % 0.3 0.0 - 0.5 %    Neutrophils, Absolute 1.27 (L) 1.50 - 7.00 10*3/mm3    Lymphocytes, Absolute 1.70 1.00 - 3.50 10*3/mm3    Monocytes,  Absolute 0.34 0.25 - 0.80 10*3/mm3    Eosinophils, Absolute 0.08 0.00 - 0.36 10*3/mm3    Basophils, Absolute 0.02 0.00 - 0.10 10*3/mm3    Immature Grans, Absolute 0.01 0.00 - 0.03 10*3/mm3    nRBC 0.0 0.0 - 0.0 /100 WBC       Assessment/Plan     ASSESSMENT/PLAN:  This is a 56 y.o. female with:  1.  Metastatic rectal cancer, Kras mutated: She had a resection on 10/31/2016 by Brie Cleemnte and [place].  Unfortunately, PET scan shows evidence for local lydia metastases as well as bilateral pulmonary metastases.    - Palliative FOLFIRI/Avastin started 2/10/17. Add Avastin with cycle 2.    - Chemotherapy was dose reduced (no 5-FU bolus and Irinotecan at 85%) for the first cycle due to neutropenia. Will dose reduce 5-FU (no bolus; dose reduce infusion down to 80%) - can consider increasing back up in the future if counts seem to improve with B12 replacement.    - Plan scans in 5 weeks, after 4 cycles, but will have to be interpreted with caution since its been almost two months since her initial scans.     2. Vitamin B12 deficiency. B12 injections and oral B12 for time being.  3. Neutropenia before intiation of chemotherapy. Likely secondary to B12 deficiency and chemo. Dose adjustments as above.   4. Cancer related pain. Norco 10 mg every 6 hrs prn.   5. Anxiety. Celexa 20 mg daily.   6. Hallucinations. Unclear etiology. Occurred when she was being unhooked. Monitor. Could have been anxiety related. Very short lived.     RTC in 2 and 4 weeks for NP and MD in 6 weeks to review scans. I asked the patient to call for any questions, concerns, or new symptoms.

## 2017-03-08 ENCOUNTER — INFUSION (OUTPATIENT)
Dept: ONCOLOGY | Facility: HOSPITAL | Age: 57
End: 2017-03-08

## 2017-03-08 VITALS
TEMPERATURE: 98.1 F | WEIGHT: 160.2 LBS | DIASTOLIC BLOOD PRESSURE: 73 MMHG | SYSTOLIC BLOOD PRESSURE: 107 MMHG | HEART RATE: 94 BPM | BODY MASS INDEX: 27.69 KG/M2

## 2017-03-08 DIAGNOSIS — C20 MALIGNANT NEOPLASM OF RECTUM (HCC): Primary | ICD-10-CM

## 2017-03-08 DIAGNOSIS — Z45.2 FITTING AND ADJUSTMENT OF VASCULAR CATHETER: ICD-10-CM

## 2017-03-08 PROCEDURE — 96523 IRRIG DRUG DELIVERY DEVICE: CPT | Performed by: INTERNAL MEDICINE

## 2017-03-08 PROCEDURE — 25010000002 HEPARIN FLUSH (PORCINE) 100 UNIT/ML SOLUTION: Performed by: INTERNAL MEDICINE

## 2017-03-08 RX ORDER — SODIUM CHLORIDE 0.9 % (FLUSH) 0.9 %
10 SYRINGE (ML) INJECTION AS NEEDED
Status: CANCELLED | OUTPATIENT
Start: 2017-03-08

## 2017-03-08 RX ORDER — SODIUM CHLORIDE 0.9 % (FLUSH) 0.9 %
10 SYRINGE (ML) INJECTION AS NEEDED
Status: DISCONTINUED | OUTPATIENT
Start: 2017-03-08 | End: 2017-03-08 | Stop reason: HOSPADM

## 2017-03-08 RX ADMIN — Medication 10 ML: at 15:38

## 2017-03-08 RX ADMIN — SODIUM CHLORIDE, PRESERVATIVE FREE 500 UNITS: 5 INJECTION INTRAVENOUS at 15:38

## 2017-03-21 ENCOUNTER — INFUSION (OUTPATIENT)
Dept: ONCOLOGY | Facility: HOSPITAL | Age: 57
End: 2017-03-21

## 2017-03-21 VITALS
WEIGHT: 156.4 LBS | HEART RATE: 92 BPM | TEMPERATURE: 98.3 F | SYSTOLIC BLOOD PRESSURE: 128 MMHG | BODY MASS INDEX: 27.03 KG/M2 | DIASTOLIC BLOOD PRESSURE: 82 MMHG

## 2017-03-21 DIAGNOSIS — C20 MALIGNANT NEOPLASM OF RECTUM (HCC): Primary | ICD-10-CM

## 2017-03-21 DIAGNOSIS — C20 MALIGNANT NEOPLASM OF RECTUM (HCC): ICD-10-CM

## 2017-03-21 LAB
ALBUMIN SERPL-MCNC: 4 G/DL (ref 3.5–5.2)
ALBUMIN/GLOB SERPL: 1.5 G/DL (ref 1.1–2.4)
ALP SERPL-CCNC: 49 U/L (ref 38–116)
ALT SERPL W P-5'-P-CCNC: 12 U/L (ref 0–33)
ANION GAP SERPL CALCULATED.3IONS-SCNC: 10.7 MMOL/L
AST SERPL-CCNC: 19 U/L (ref 0–32)
BASOPHILS # BLD AUTO: 0.02 10*3/MM3 (ref 0–0.1)
BASOPHILS NFR BLD AUTO: 0.7 % (ref 0–1.1)
BILIRUB SERPL-MCNC: 0.5 MG/DL (ref 0.1–1.2)
BILIRUB UR QL STRIP: NEGATIVE
BUN BLD-MCNC: 17 MG/DL (ref 6–20)
BUN/CREAT SERPL: 25 (ref 7.3–30)
CALCIUM SPEC-SCNC: 9 MG/DL (ref 8.5–10.2)
CHLORIDE SERPL-SCNC: 105 MMOL/L (ref 98–107)
CLARITY UR: CLEAR
CO2 SERPL-SCNC: 26.3 MMOL/L (ref 22–29)
COLOR UR: YELLOW
CREAT BLD-MCNC: 0.68 MG/DL (ref 0.6–1.1)
DEPRECATED RDW RBC AUTO: 43.3 FL (ref 37–49)
EOSINOPHIL # BLD AUTO: 0.14 10*3/MM3 (ref 0–0.36)
EOSINOPHIL NFR BLD AUTO: 4.9 % (ref 1–5)
ERYTHROCYTE [DISTWIDTH] IN BLOOD BY AUTOMATED COUNT: 15 % (ref 11.7–14.5)
GFR SERPL CREATININE-BSD FRML MDRD: 108 ML/MIN/1.73
GFR SERPL CREATININE-BSD FRML MDRD: 90 ML/MIN/1.73
GLOBULIN UR ELPH-MCNC: 2.7 GM/DL (ref 1.8–3.5)
GLUCOSE BLD-MCNC: 131 MG/DL (ref 74–124)
GLUCOSE UR STRIP-MCNC: NEGATIVE MG/DL
HCT VFR BLD AUTO: 35.6 % (ref 34–45)
HGB BLD-MCNC: 11.9 G/DL (ref 11.5–14.9)
HGB UR QL STRIP.AUTO: ABNORMAL
IMM GRANULOCYTES # BLD: 0.01 10*3/MM3 (ref 0–0.03)
IMM GRANULOCYTES NFR BLD: 0.4 % (ref 0–0.5)
KETONES UR QL STRIP: NEGATIVE
LEUKOCYTE ESTERASE UR QL STRIP.AUTO: ABNORMAL
LYMPHOCYTES # BLD AUTO: 1.49 10*3/MM3 (ref 1–3.5)
LYMPHOCYTES NFR BLD AUTO: 52.3 % (ref 20–49)
MCH RBC QN AUTO: 27 PG (ref 27–33)
MCHC RBC AUTO-ENTMCNC: 33.4 G/DL (ref 32–35)
MCV RBC AUTO: 80.7 FL (ref 83–97)
MONOCYTES # BLD AUTO: 0.17 10*3/MM3 (ref 0.25–0.8)
MONOCYTES NFR BLD AUTO: 6 % (ref 4–12)
NEUTROPHILS # BLD AUTO: 1.02 10*3/MM3 (ref 1.5–7)
NEUTROPHILS NFR BLD AUTO: 35.7 % (ref 39–75)
NITRITE UR QL STRIP: NEGATIVE
NRBC BLD MANUAL-RTO: 0 /100 WBC (ref 0–0)
PH UR STRIP.AUTO: <=5 [PH] (ref 4.5–8)
PLATELET # BLD AUTO: 188 10*3/MM3 (ref 150–375)
PMV BLD AUTO: 8.9 FL (ref 8.9–12.1)
POTASSIUM BLD-SCNC: 4 MMOL/L (ref 3.5–4.7)
PROT SERPL-MCNC: 6.7 G/DL (ref 6.3–8)
PROT UR QL STRIP: NEGATIVE
RBC # BLD AUTO: 4.41 10*6/MM3 (ref 3.9–5)
SODIUM BLD-SCNC: 142 MMOL/L (ref 134–145)
SP GR UR STRIP: 1.01 (ref 1–1.03)
UROBILINOGEN UR QL STRIP: ABNORMAL
WBC NRBC COR # BLD: 2.85 10*3/MM3 (ref 4–10)

## 2017-03-21 PROCEDURE — 25010000002 LEUCOVORIN CALCIUM PER 50 MG: Performed by: INTERNAL MEDICINE

## 2017-03-21 PROCEDURE — 25010000002 DEXAMETHASONE PER 1 MG: Performed by: INTERNAL MEDICINE

## 2017-03-21 PROCEDURE — 85025 COMPLETE CBC W/AUTO DIFF WBC: CPT

## 2017-03-21 PROCEDURE — 25010000002 BEVACIZUMAB PER 10 MG: Performed by: INTERNAL MEDICINE

## 2017-03-21 PROCEDURE — 96417 CHEMO IV INFUS EACH ADDL SEQ: CPT | Performed by: INTERNAL MEDICINE

## 2017-03-21 PROCEDURE — 96413 CHEMO IV INFUSION 1 HR: CPT | Performed by: INTERNAL MEDICINE

## 2017-03-21 PROCEDURE — 25010000002 FLUOROURACIL PER 500 MG: Performed by: INTERNAL MEDICINE

## 2017-03-21 PROCEDURE — 36415 COLL VENOUS BLD VENIPUNCTURE: CPT | Performed by: INTERNAL MEDICINE

## 2017-03-21 PROCEDURE — 80053 COMPREHEN METABOLIC PANEL: CPT

## 2017-03-21 PROCEDURE — 25010000002 PALONOSETRON PER 25 MCG: Performed by: INTERNAL MEDICINE

## 2017-03-21 PROCEDURE — 96416 CHEMO PROLONG INFUSE W/PUMP: CPT | Performed by: INTERNAL MEDICINE

## 2017-03-21 PROCEDURE — 96368 THER/DIAG CONCURRENT INF: CPT | Performed by: INTERNAL MEDICINE

## 2017-03-21 PROCEDURE — 96375 TX/PRO/DX INJ NEW DRUG ADDON: CPT | Performed by: INTERNAL MEDICINE

## 2017-03-21 PROCEDURE — 96415 CHEMO IV INFUSION ADDL HR: CPT | Performed by: INTERNAL MEDICINE

## 2017-03-21 PROCEDURE — 81003 URINALYSIS AUTO W/O SCOPE: CPT

## 2017-03-21 PROCEDURE — 25010000002 IRINOTECAN PER 20 MG: Performed by: INTERNAL MEDICINE

## 2017-03-21 RX ORDER — PALONOSETRON 0.05 MG/ML
0.25 INJECTION, SOLUTION INTRAVENOUS ONCE
Status: COMPLETED | OUTPATIENT
Start: 2017-03-21 | End: 2017-03-21

## 2017-03-21 RX ORDER — ATROPINE SULFATE 0.4 MG/ML
0.25 AMPUL (ML) INJECTION
Status: DISCONTINUED | OUTPATIENT
Start: 2017-03-21 | End: 2017-03-21 | Stop reason: HOSPADM

## 2017-03-21 RX ORDER — SODIUM CHLORIDE 9 MG/ML
250 INJECTION, SOLUTION INTRAVENOUS ONCE
Status: COMPLETED | OUTPATIENT
Start: 2017-03-21 | End: 2017-03-21

## 2017-03-21 RX ADMIN — DEXAMETHASONE SODIUM PHOSPHATE 12 MG: 10 INJECTION INTRAMUSCULAR; INTRAVENOUS at 08:48

## 2017-03-21 RX ADMIN — BEVACIZUMAB 360 MG: 400 INJECTION, SOLUTION INTRAVENOUS at 09:08

## 2017-03-21 RX ADMIN — SODIUM CHLORIDE 250 ML: 900 INJECTION, SOLUTION INTRAVENOUS at 08:42

## 2017-03-21 RX ADMIN — FLUOROURACIL 3400 MG: 50 INJECTION, SOLUTION INTRAVENOUS at 11:48

## 2017-03-21 RX ADMIN — PALONOSETRON HYDROCHLORIDE 0.25 MG: 0.25 INJECTION INTRAVENOUS at 08:42

## 2017-03-21 RX ADMIN — ATROPINE SULFATE 0.25 MG: 0.4 INJECTION, SOLUTION INTRAMUSCULAR; INTRAVENOUS; SUBCUTANEOUS at 08:45

## 2017-03-21 RX ADMIN — LEUCOVORIN CALCIUM 710 MG: 350 INJECTION, POWDER, LYOPHILIZED, FOR SOLUTION INTRAMUSCULAR; INTRAVENOUS at 10:09

## 2017-03-21 RX ADMIN — DEXTROSE MONOHYDRATE 270 MG: 5 INJECTION, SOLUTION INTRAVENOUS at 10:09

## 2017-03-22 LAB
ALBUMIN SERPL-MCNC: 3.6 G/DL (ref 2.9–4.4)
ALBUMIN/GLOB SERPL: 1.1 {RATIO} (ref 0.7–1.7)
ALPHA1 GLOB FLD ELPH-MCNC: 0.3 G/DL (ref 0–0.4)
ALPHA2 GLOB SERPL ELPH-MCNC: 0.6 G/DL (ref 0.4–1)
B-GLOBULIN SERPL ELPH-MCNC: 1.2 G/DL (ref 0.7–1.3)
GAMMA GLOB SERPL ELPH-MCNC: 1.1 G/DL (ref 0.4–1.8)
GLOBULIN SER CALC-MCNC: 3.2 G/DL (ref 2.2–3.9)
IGA SERPL-MCNC: 248 MG/DL (ref 87–352)
IGG SERPL-MCNC: 979 MG/DL (ref 700–1600)
IGM SERPL-MCNC: 80 MG/DL (ref 26–217)
Lab: NORMAL
M-SPIKE: NORMAL G/DL
PROT PATTERN SERPL IFE-IMP: NORMAL
PROT SERPL-MCNC: 6.8 G/DL (ref 6–8.5)

## 2017-03-23 ENCOUNTER — INFUSION (OUTPATIENT)
Dept: ONCOLOGY | Facility: HOSPITAL | Age: 57
End: 2017-03-23

## 2017-03-23 DIAGNOSIS — D70.9 NEUTROPENIA, UNSPECIFIED TYPE (HCC): Primary | ICD-10-CM

## 2017-03-23 DIAGNOSIS — C20 MALIGNANT NEOPLASM OF RECTUM (HCC): ICD-10-CM

## 2017-03-23 LAB
ALBUMIN SERPL-MCNC: 3.9 G/DL (ref 3.5–5.2)
ALBUMIN/GLOB SERPL: 1.3 G/DL (ref 1.1–2.4)
ALP SERPL-CCNC: 46 U/L (ref 38–116)
ALT SERPL W P-5'-P-CCNC: 13 U/L (ref 0–33)
ANION GAP SERPL CALCULATED.3IONS-SCNC: 9.1 MMOL/L
AST SERPL-CCNC: 17 U/L (ref 0–32)
BASOPHILS # BLD AUTO: 0.03 10*3/MM3 (ref 0–0.1)
BASOPHILS NFR BLD AUTO: 0.9 % (ref 0–1.1)
BILIRUB SERPL-MCNC: 0.6 MG/DL (ref 0.1–1.2)
BUN BLD-MCNC: 17 MG/DL (ref 6–20)
BUN/CREAT SERPL: 23 (ref 7.3–30)
CALCIUM SPEC-SCNC: 9 MG/DL (ref 8.5–10.2)
CHLORIDE SERPL-SCNC: 104 MMOL/L (ref 98–107)
CO2 SERPL-SCNC: 28.9 MMOL/L (ref 22–29)
CREAT BLD-MCNC: 0.74 MG/DL (ref 0.6–1.1)
DEPRECATED RDW RBC AUTO: 44.6 FL (ref 37–49)
EOSINOPHIL # BLD AUTO: 0.1 10*3/MM3 (ref 0–0.36)
EOSINOPHIL NFR BLD AUTO: 3 % (ref 1–5)
ERYTHROCYTE [DISTWIDTH] IN BLOOD BY AUTOMATED COUNT: 15.2 % (ref 11.7–14.5)
GFR SERPL CREATININE-BSD FRML MDRD: 81 ML/MIN/1.73
GFR SERPL CREATININE-BSD FRML MDRD: 98 ML/MIN/1.73
GLOBULIN UR ELPH-MCNC: 2.9 GM/DL (ref 1.8–3.5)
GLUCOSE BLD-MCNC: 89 MG/DL (ref 74–124)
HCT VFR BLD AUTO: 36 % (ref 34–45)
HGB BLD-MCNC: 12 G/DL (ref 11.5–14.9)
HOLD SPECIMEN: NORMAL
IMM GRANULOCYTES # BLD: 0 10*3/MM3 (ref 0–0.03)
IMM GRANULOCYTES NFR BLD: 0 % (ref 0–0.5)
LYMPHOCYTES # BLD AUTO: 1.75 10*3/MM3 (ref 1–3.5)
LYMPHOCYTES NFR BLD AUTO: 52.1 % (ref 20–49)
MCH RBC QN AUTO: 27.3 PG (ref 27–33)
MCHC RBC AUTO-ENTMCNC: 33.3 G/DL (ref 32–35)
MCV RBC AUTO: 81.8 FL (ref 83–97)
MONOCYTES # BLD AUTO: 0.14 10*3/MM3 (ref 0.25–0.8)
MONOCYTES NFR BLD AUTO: 4.2 % (ref 4–12)
NEUTROPHILS # BLD AUTO: 1.34 10*3/MM3 (ref 1.5–7)
NEUTROPHILS NFR BLD AUTO: 39.8 % (ref 39–75)
NRBC BLD MANUAL-RTO: 0 /100 WBC (ref 0–0)
PLATELET # BLD AUTO: 171 10*3/MM3 (ref 150–375)
PMV BLD AUTO: 8.7 FL (ref 8.9–12.1)
POTASSIUM BLD-SCNC: 4.1 MMOL/L (ref 3.5–4.7)
PROT SERPL-MCNC: 6.8 G/DL (ref 6.3–8)
RBC # BLD AUTO: 4.4 10*6/MM3 (ref 3.9–5)
SODIUM BLD-SCNC: 142 MMOL/L (ref 134–145)
WBC NRBC COR # BLD: 3.36 10*3/MM3 (ref 4–10)

## 2017-03-23 PROCEDURE — 80053 COMPREHEN METABOLIC PANEL: CPT | Performed by: INTERNAL MEDICINE

## 2017-03-23 PROCEDURE — 85025 COMPLETE CBC W/AUTO DIFF WBC: CPT | Performed by: INTERNAL MEDICINE

## 2017-03-24 ENCOUNTER — TELEPHONE (OUTPATIENT)
Dept: ONCOLOGY | Facility: HOSPITAL | Age: 57
End: 2017-03-24

## 2017-03-24 NOTE — TELEPHONE ENCOUNTER
----- Message from Maye Mcfarland sent at 3/24/2017  8:16 AM EDT -----  Contact: daughter  leonidas   Needs to speak to nurse  [phone]      Spoke with daughter, patients father  in [place] and she is here.  Daughter is asking if she can have anything for anxiety to help cope with this.  Suggested they contact her PCP first to see if they have any recommendations.  Daughter v/u and will call PCP

## 2017-03-29 DIAGNOSIS — C20 MALIGNANT NEOPLASM OF RECTUM (HCC): ICD-10-CM

## 2017-04-03 ENCOUNTER — APPOINTMENT (OUTPATIENT)
Dept: ONCOLOGY | Facility: HOSPITAL | Age: 57
End: 2017-04-03

## 2017-04-03 ENCOUNTER — OFFICE VISIT (OUTPATIENT)
Dept: ONCOLOGY | Facility: CLINIC | Age: 57
End: 2017-04-03

## 2017-04-03 ENCOUNTER — INFUSION (OUTPATIENT)
Dept: ONCOLOGY | Facility: HOSPITAL | Age: 57
End: 2017-04-03

## 2017-04-03 VITALS
TEMPERATURE: 98.6 F | OXYGEN SATURATION: 96 % | HEART RATE: 87 BPM | RESPIRATION RATE: 16 BRPM | BODY MASS INDEX: 27.25 KG/M2 | WEIGHT: 159.6 LBS | HEIGHT: 64 IN | DIASTOLIC BLOOD PRESSURE: 84 MMHG | SYSTOLIC BLOOD PRESSURE: 118 MMHG

## 2017-04-03 DIAGNOSIS — C20 MALIGNANT NEOPLASM OF RECTUM (HCC): Primary | ICD-10-CM

## 2017-04-03 DIAGNOSIS — Z45.2 FITTING AND ADJUSTMENT OF VASCULAR CATHETER: ICD-10-CM

## 2017-04-03 DIAGNOSIS — C20 MALIGNANT NEOPLASM OF RECTUM (HCC): ICD-10-CM

## 2017-04-03 LAB
ALBUMIN SERPL-MCNC: 4 G/DL (ref 3.5–5.2)
ALBUMIN/GLOB SERPL: 1.5 G/DL (ref 1.1–2.4)
ALP SERPL-CCNC: 52 U/L (ref 38–116)
ALT SERPL W P-5'-P-CCNC: 17 U/L (ref 0–33)
ANION GAP SERPL CALCULATED.3IONS-SCNC: 11.9 MMOL/L
AST SERPL-CCNC: 19 U/L (ref 0–32)
BASOPHILS # BLD AUTO: 0.02 10*3/MM3 (ref 0–0.1)
BASOPHILS NFR BLD AUTO: 0.6 % (ref 0–1.1)
BILIRUB SERPL-MCNC: 0.4 MG/DL (ref 0.1–1.2)
BILIRUB UR QL STRIP: NEGATIVE
BUN BLD-MCNC: 17 MG/DL (ref 6–20)
BUN/CREAT SERPL: 19.3 (ref 7.3–30)
CALCIUM SPEC-SCNC: 9 MG/DL (ref 8.5–10.2)
CEA SERPL-MCNC: 7.94 NG/ML
CHLORIDE SERPL-SCNC: 102 MMOL/L (ref 98–107)
CLARITY UR: CLEAR
CO2 SERPL-SCNC: 28.1 MMOL/L (ref 22–29)
COLOR UR: YELLOW
CREAT BLD-MCNC: 0.88 MG/DL (ref 0.6–1.1)
DEPRECATED RDW RBC AUTO: 42.4 FL (ref 37–49)
EOSINOPHIL # BLD AUTO: 0.13 10*3/MM3 (ref 0–0.36)
EOSINOPHIL NFR BLD AUTO: 4.1 % (ref 1–5)
ERYTHROCYTE [DISTWIDTH] IN BLOOD BY AUTOMATED COUNT: 14.7 % (ref 11.7–14.5)
GFR SERPL CREATININE-BSD FRML MDRD: 66 ML/MIN/1.73
GFR SERPL CREATININE-BSD FRML MDRD: 81 ML/MIN/1.73
GLOBULIN UR ELPH-MCNC: 2.7 GM/DL (ref 1.8–3.5)
GLUCOSE BLD-MCNC: 107 MG/DL (ref 74–124)
GLUCOSE UR STRIP-MCNC: NEGATIVE MG/DL
HCT VFR BLD AUTO: 36.2 % (ref 34–45)
HGB BLD-MCNC: 12 G/DL (ref 11.5–14.9)
HGB UR QL STRIP.AUTO: ABNORMAL
IMM GRANULOCYTES # BLD: 0.01 10*3/MM3 (ref 0–0.03)
IMM GRANULOCYTES NFR BLD: 0.3 % (ref 0–0.5)
KETONES UR QL STRIP: NEGATIVE
LEUKOCYTE ESTERASE UR QL STRIP.AUTO: ABNORMAL
LYMPHOCYTES # BLD AUTO: 1.87 10*3/MM3 (ref 1–3.5)
LYMPHOCYTES NFR BLD AUTO: 59.2 % (ref 20–49)
MCH RBC QN AUTO: 27 PG (ref 27–33)
MCHC RBC AUTO-ENTMCNC: 33.1 G/DL (ref 32–35)
MCV RBC AUTO: 81.3 FL (ref 83–97)
MONOCYTES # BLD AUTO: 0.29 10*3/MM3 (ref 0.25–0.8)
MONOCYTES NFR BLD AUTO: 9.2 % (ref 4–12)
NEUTROPHILS # BLD AUTO: 0.84 10*3/MM3 (ref 1.5–7)
NEUTROPHILS NFR BLD AUTO: 26.6 % (ref 39–75)
NITRITE UR QL STRIP: NEGATIVE
NRBC BLD MANUAL-RTO: 0 /100 WBC (ref 0–0)
PH UR STRIP.AUTO: 5.5 [PH] (ref 4.5–8)
PLATELET # BLD AUTO: 173 10*3/MM3 (ref 150–375)
PMV BLD AUTO: 8.8 FL (ref 8.9–12.1)
POTASSIUM BLD-SCNC: 3.9 MMOL/L (ref 3.5–4.7)
PROT SERPL-MCNC: 6.7 G/DL (ref 6.3–8)
PROT UR QL STRIP: NEGATIVE
RBC # BLD AUTO: 4.45 10*6/MM3 (ref 3.9–5)
SODIUM BLD-SCNC: 142 MMOL/L (ref 134–145)
SP GR UR STRIP: 1.02 (ref 1–1.03)
UROBILINOGEN UR QL STRIP: ABNORMAL
WBC NRBC COR # BLD: 3.16 10*3/MM3 (ref 4–10)

## 2017-04-03 PROCEDURE — 81003 URINALYSIS AUTO W/O SCOPE: CPT | Performed by: NURSE PRACTITIONER

## 2017-04-03 PROCEDURE — 80053 COMPREHEN METABOLIC PANEL: CPT | Performed by: NURSE PRACTITIONER

## 2017-04-03 PROCEDURE — 82378 CARCINOEMBRYONIC ANTIGEN: CPT | Performed by: INTERNAL MEDICINE

## 2017-04-03 PROCEDURE — 85025 COMPLETE CBC W/AUTO DIFF WBC: CPT | Performed by: NURSE PRACTITIONER

## 2017-04-03 PROCEDURE — 25010000002 HEPARIN FLUSH (PORCINE) 100 UNIT/ML SOLUTION: Performed by: INTERNAL MEDICINE

## 2017-04-03 PROCEDURE — 99214 OFFICE O/P EST MOD 30 MIN: CPT | Performed by: NURSE PRACTITIONER

## 2017-04-03 RX ORDER — SODIUM CHLORIDE 0.9 % (FLUSH) 0.9 %
10 SYRINGE (ML) INJECTION AS NEEDED
Status: ACTIVE | OUTPATIENT
Start: 2017-04-03

## 2017-04-03 RX ORDER — SODIUM CHLORIDE 0.9 % (FLUSH) 0.9 %
10 SYRINGE (ML) INJECTION AS NEEDED
Status: CANCELLED | OUTPATIENT
Start: 2017-04-03

## 2017-04-03 RX ADMIN — Medication 10 ML: at 08:39

## 2017-04-03 RX ADMIN — SODIUM CHLORIDE, PRESERVATIVE FREE 500 UNITS: 5 INJECTION INTRAVENOUS at 08:40

## 2017-04-03 NOTE — PROGRESS NOTES
[facility] GROUP OUTPATIENT FOLLOW UP CLINIC VISIT    REASON FOR FOLLOW-UP:    Malignant neoplasm of rectum    Staging form: Colon and Rectum, AJCC V7      Pathologic: Stage IIIC (T4b, N1b, cM0) - Signed by Jude Stewart MD on 2016        Staging comments: Suspected lung metastases.        Clinical: Stage TRUDY (T4b, N1b, M1a) - Signed by Jude Stewart MD on 2016  Kras mutation positive.  BRAF negative.  microsatellite stable.      HISTORY OF PRESENT ILLNESS:  Benigno Quispe is a 56 y.o. female who returns for cycle 4 chemotherapy.  Unfortunately, she is neutropenic today we will be holding chemotherapy.  Otherwise, she has no complaints today.  She states she feels well.  Her father did pass away within the past 2 weeks and she is having increased anxiety and sadness over this.  Unfortunately, he is in [facility] and she is not able to travel for his .    She does report fatigue following treatment but otherwise no nausea, vomiting, diarrhea, mouth sores or fevers.    ONCOLOGIC HISTORY:     Malignant neoplasm of rectum    10/6/2016 Biopsy    Upper and lower endoscopy by Dr. Keller:  Rectal mass showing invasive moderately differentiated adenocarcinoma.        10/12/2016 Imaging    CT imaging showed bilateral pulmonary nodules with the largest in the left lower lobe measuring 11 mm, concerning for pulmonary metastases. A mass in the pelvis measured about 4 x 3 cm with a 15 mm lymph node in the right pelvis.      10/31/2016 Surgery    APR with posterior vaginectomy by Brie Clemente and Marisa Burris. Pathology showed a 5.5 cm low-grade well to moderately differentiated adenocarcinoma with 2 of 20 lymph nodes positive for metastatic disease and a positive radial margin.      10/31/2016 Imaging    PET scan:  Multiple bilateral hypermetabolic pulmonary nodules likely  represent metastases. The hypermetabolic nodes along both pelvic  sidewalls are suspected to be metastatic. The activity along the  "anal  canal and the activity within shotty bilateral groin nodes is  indeterminate.      2/13/2017 -  Chemotherapy    OP COLORECTAL FOLFIRI + Bevacizumab (Irinotecan / Leucovorin / Fluorouracil / Bevacizumab)           PAST MEDICAL, SURGICAL, FAMILY, AND SOCIAL HISTORIES were reviewed with the patient and in the electronic medical record, and were updated if indicated.    ALLERGIES:  Allergies   Allergen Reactions   • Adhesive Tape        MEDICATIONS:  The medication list has been reviewed with the patient by the medical assistant, and the list has been updated in the electronic medical record, which I reviewed.  Medication dosages and frequencies were confirmed to be accurate.    REVIEW OF SYSTEMS:  Review of Systems   Constitutional: Negative for activity change, appetite change, chills, diaphoresis, fatigue, fever and unexpected weight change.   Respiratory: Negative for cough, chest tightness and shortness of breath.    Cardiovascular: Negative for chest pain and leg swelling.   Gastrointestinal: Negative for abdominal pain, blood in stool, constipation, diarrhea, nausea and vomiting.   Endocrine: Negative.    Genitourinary: Negative.    Musculoskeletal: Negative for arthralgias, joint swelling and myalgias.   Allergic/Immunologic: Negative.    Neurological: Negative.    Hematological: Negative for adenopathy. Does not bruise/bleed easily.   Psychiatric/Behavioral: The patient is nervous/anxious (father recently passed away).      Objective     Vitals:    04/03/17 0755   BP: 118/84   Pulse: 87   Resp: 16   Temp: 98.6 °F (37 °C)   TempSrc: Oral   SpO2: 96%   Weight: 159 lb 9.6 oz (72.4 kg)   Height: 63.78\" (162 cm)   PainSc: 5  Comment: lower back pain     GENERAL:  Well-developed, well-nourished female in no acute distress.   SKIN:  Warm, dry without rashes, purpura or petechiae.  CHEST:  Lungs clear to auscultation. Good airflow. Mediport normal.   CARDIAC:  Regular rate and rhythm without murmurs, rubs or " gallops. Normal S1,S2. No edema  EXTREMITIES:  No clubbing, cyanosis or edema.  PSYCHIATRIC:  Tearful at times but comments on the support that shoe does have..      DIAGNOSTIC DATA:  Results for orders placed or performed in visit on 04/03/17   Urinalysis   Result Value Ref Range    Color, UA Yellow Yellow, Straw    Appearance, UA Clear Clear    pH, UA 5.5 4.5 - 8.0    Specific Gravity, UA 1.020 1.002 - 1.030    Glucose, UA Negative Negative    Ketones, UA Negative Negative    Bilirubin, UA Negative Negative    Blood, UA Small (1+) (A) Negative    Protein, UA Negative Negative    Leuk Esterase, UA Trace (A) Negative    Nitrite, UA Negative Negative    Urobilinogen, UA 0.2 E.U./dL 0.2 - 1.0 E.U./dL   CBC Auto Differential   Result Value Ref Range    WBC 3.16 (L) 4.00 - 10.00 10*3/mm3    RBC 4.45 3.90 - 5.00 10*6/mm3    Hemoglobin 12.0 11.5 - 14.9 g/dL    Hematocrit 36.2 34.0 - 45.0 %    MCV 81.3 (L) 83.0 - 97.0 fL    MCH 27.0 27.0 - 33.0 pg    MCHC 33.1 32.0 - 35.0 g/dL    RDW 14.7 (H) 11.7 - 14.5 %    RDW-SD 42.4 37.0 - 49.0 fl    MPV 8.8 (L) 8.9 - 12.1 fL    Platelets 173 150 - 375 10*3/mm3    Neutrophil % 26.6 (L) 39.0 - 75.0 %    Lymphocyte % 59.2 (H) 20.0 - 49.0 %    Monocyte % 9.2 4.0 - 12.0 %    Eosinophil % 4.1 1.0 - 5.0 %    Basophil % 0.6 0.0 - 1.1 %    Immature Grans % 0.3 0.0 - 0.5 %    Neutrophils, Absolute 0.84 (L) 1.50 - 7.00 10*3/mm3    Lymphocytes, Absolute 1.87 1.00 - 3.50 10*3/mm3    Monocytes, Absolute 0.29 0.25 - 0.80 10*3/mm3    Eosinophils, Absolute 0.13 0.00 - 0.36 10*3/mm3    Basophils, Absolute 0.02 0.00 - 0.10 10*3/mm3    Immature Grans, Absolute 0.01 0.00 - 0.03 10*3/mm3    nRBC 0.0 0.0 - 0.0 /100 WBC       Assessment/Plan     ASSESSMENT/PLAN:  This is a 56 y.o. female with:  1.  Metastatic rectal cancer, Kras mutated: She had a resection on 10/31/2016 by Brie Clemente and [place].  Unfortunately, PET scan shows evidence for local lydia metastases as well as bilateral pulmonary metastases.     - Palliative FOLFIRI/Avastin started 2/10/17. Add Avastin with cycle 2.    - Chemotherapy was dose reduced (no 5-FU bolus and Irinotecan at 85%) for the first cycle due to neutropenia. Will dose reduce 5-FU (no bolus; dose reduce infusion down to 80%)   -Patient is neutropenic today, she will return in 1 week.  At that time we will reduce 5-FU and Irinotecan by an addtional  10% (no 5-FU bolus, Irinotecan at 75% and 5-FU at 70%) as discussed with Dr. Paez today.    - Plan scans in 5 weeks, after 4 cycles, but will have to be interpreted with caution since its been almost two months since her initial scans.     2. Vitamin B12 deficiency. B12 injections and oral B12 for time being.  3. Neutropenia before intiation of chemotherapy. Likely secondary to B12 deficiency and chemo. Dose adjustments as above.   4. Cancer related pain. Norco 10 mg every 6 hrs prn.   5. Anxiety. Celexa 20 mg daily. Her father recently passed away which is adding to her anxiety.  6. Hallucinations. Unclear etiology. Occurred when she was being unhooked. Monitor. Could have been anxiety related. Very short lived.     RTC in 1 and 3 weeks for NP and MD in 5 weeks to review scans. I asked the patient to call for any questions, concerns, or new symptoms.

## 2017-04-10 ENCOUNTER — INFUSION (OUTPATIENT)
Dept: ONCOLOGY | Facility: HOSPITAL | Age: 57
End: 2017-04-10

## 2017-04-10 ENCOUNTER — OFFICE VISIT (OUTPATIENT)
Dept: ONCOLOGY | Facility: CLINIC | Age: 57
End: 2017-04-10

## 2017-04-10 VITALS
RESPIRATION RATE: 16 BRPM | WEIGHT: 156.4 LBS | TEMPERATURE: 98.2 F | DIASTOLIC BLOOD PRESSURE: 70 MMHG | HEART RATE: 86 BPM | BODY MASS INDEX: 26.7 KG/M2 | HEIGHT: 64 IN | SYSTOLIC BLOOD PRESSURE: 120 MMHG

## 2017-04-10 DIAGNOSIS — Z45.2 FITTING AND ADJUSTMENT OF VASCULAR CATHETER: Primary | ICD-10-CM

## 2017-04-10 DIAGNOSIS — C20 MALIGNANT NEOPLASM OF RECTUM (HCC): Primary | ICD-10-CM

## 2017-04-10 DIAGNOSIS — D70.1 CHEMOTHERAPY-INDUCED NEUTROPENIA (HCC): ICD-10-CM

## 2017-04-10 DIAGNOSIS — T45.1X5A CHEMOTHERAPY-INDUCED NEUTROPENIA (HCC): ICD-10-CM

## 2017-04-10 DIAGNOSIS — C20 MALIGNANT NEOPLASM OF RECTUM (HCC): ICD-10-CM

## 2017-04-10 LAB
ALBUMIN SERPL-MCNC: 4.4 G/DL (ref 3.5–5.2)
ALBUMIN/GLOB SERPL: 1.6 G/DL (ref 1.1–2.4)
ALP SERPL-CCNC: 50 U/L (ref 38–116)
ALT SERPL W P-5'-P-CCNC: 16 U/L (ref 0–33)
ANION GAP SERPL CALCULATED.3IONS-SCNC: 12.5 MMOL/L
AST SERPL-CCNC: 23 U/L (ref 0–32)
BASOPHILS # BLD AUTO: 0.02 10*3/MM3 (ref 0–0.1)
BASOPHILS NFR BLD AUTO: 0.7 % (ref 0–1.1)
BILIRUB SERPL-MCNC: 0.6 MG/DL (ref 0.1–1.2)
BILIRUB UR QL STRIP: NEGATIVE
BUN BLD-MCNC: 15 MG/DL (ref 6–20)
BUN/CREAT SERPL: 20.8 (ref 7.3–30)
CALCIUM SPEC-SCNC: 9.3 MG/DL (ref 8.5–10.2)
CHLORIDE SERPL-SCNC: 103 MMOL/L (ref 98–107)
CLARITY UR: CLEAR
CO2 SERPL-SCNC: 25.5 MMOL/L (ref 22–29)
COLOR UR: YELLOW
CREAT BLD-MCNC: 0.72 MG/DL (ref 0.6–1.1)
DEPRECATED RDW RBC AUTO: 42.6 FL (ref 37–49)
EOSINOPHIL # BLD AUTO: 0.13 10*3/MM3 (ref 0–0.36)
EOSINOPHIL NFR BLD AUTO: 4.6 % (ref 1–5)
ERYTHROCYTE [DISTWIDTH] IN BLOOD BY AUTOMATED COUNT: 14.7 % (ref 11.7–14.5)
GFR SERPL CREATININE-BSD FRML MDRD: 102 ML/MIN/1.73
GFR SERPL CREATININE-BSD FRML MDRD: 84 ML/MIN/1.73
GLOBULIN UR ELPH-MCNC: 2.8 GM/DL (ref 1.8–3.5)
GLUCOSE BLD-MCNC: 109 MG/DL (ref 74–124)
GLUCOSE UR STRIP-MCNC: NEGATIVE MG/DL
HCT VFR BLD AUTO: 38.5 % (ref 34–45)
HGB BLD-MCNC: 13 G/DL (ref 11.5–14.9)
HGB UR QL STRIP.AUTO: ABNORMAL
HOLD SPECIMEN: NORMAL
IMM GRANULOCYTES # BLD: 0 10*3/MM3 (ref 0–0.03)
IMM GRANULOCYTES NFR BLD: 0 % (ref 0–0.5)
KETONES UR QL STRIP: NEGATIVE
LEUKOCYTE ESTERASE UR QL STRIP.AUTO: ABNORMAL
LYMPHOCYTES # BLD AUTO: 1.48 10*3/MM3 (ref 1–3.5)
LYMPHOCYTES NFR BLD AUTO: 52.9 % (ref 20–49)
MCH RBC QN AUTO: 27.3 PG (ref 27–33)
MCHC RBC AUTO-ENTMCNC: 33.8 G/DL (ref 32–35)
MCV RBC AUTO: 80.9 FL (ref 83–97)
MONOCYTES # BLD AUTO: 0.28 10*3/MM3 (ref 0.25–0.8)
MONOCYTES NFR BLD AUTO: 10 % (ref 4–12)
NEUTROPHILS # BLD AUTO: 0.89 10*3/MM3 (ref 1.5–7)
NEUTROPHILS NFR BLD AUTO: 31.8 % (ref 39–75)
NITRITE UR QL STRIP: NEGATIVE
NRBC BLD MANUAL-RTO: 0 /100 WBC (ref 0–0)
PH UR STRIP.AUTO: 5.5 [PH] (ref 4.5–8)
PLATELET # BLD AUTO: 195 10*3/MM3 (ref 150–375)
PMV BLD AUTO: 8.9 FL (ref 8.9–12.1)
POTASSIUM BLD-SCNC: 4.2 MMOL/L (ref 3.5–4.7)
PROT SERPL-MCNC: 7.2 G/DL (ref 6.3–8)
PROT UR QL STRIP: ABNORMAL
RBC # BLD AUTO: 4.76 10*6/MM3 (ref 3.9–5)
SODIUM BLD-SCNC: 141 MMOL/L (ref 134–145)
SP GR UR STRIP: 1.02 (ref 1–1.03)
UROBILINOGEN UR QL STRIP: ABNORMAL
WBC NRBC COR # BLD: 2.8 10*3/MM3 (ref 4–10)

## 2017-04-10 PROCEDURE — 85025 COMPLETE CBC W/AUTO DIFF WBC: CPT

## 2017-04-10 PROCEDURE — 81003 URINALYSIS AUTO W/O SCOPE: CPT

## 2017-04-10 PROCEDURE — 80053 COMPREHEN METABOLIC PANEL: CPT

## 2017-04-10 PROCEDURE — 99213 OFFICE O/P EST LOW 20 MIN: CPT | Performed by: NURSE PRACTITIONER

## 2017-04-10 RX ORDER — SODIUM CHLORIDE 0.9 % (FLUSH) 0.9 %
10 SYRINGE (ML) INJECTION AS NEEDED
Status: DISCONTINUED | OUTPATIENT
Start: 2017-04-10 | End: 2017-04-10 | Stop reason: HOSPADM

## 2017-04-10 RX ORDER — SODIUM CHLORIDE 0.9 % (FLUSH) 0.9 %
10 SYRINGE (ML) INJECTION AS NEEDED
Status: CANCELLED | OUTPATIENT
Start: 2017-04-10

## 2017-04-10 RX ADMIN — Medication 10 ML: at 11:45

## 2017-04-10 NOTE — PROGRESS NOTES
[facility] GROUP OUTPATIENT FOLLOW UP CLINIC VISIT    REASON FOR FOLLOW-UP:    Malignant neoplasm of rectum    Staging form: Colon and Rectum, AJCC V7      Pathologic: Stage IIIC (T4b, N1b, cM0) - Signed by Jude Stewart MD on 2016        Staging comments: Suspected lung metastases.        Clinical: Stage TRUDY (T4b, N1b, M1a) - Signed by Jude Stewart MD on 2016  Kras mutation positive.  BRAF negative.  microsatellite stable.      HISTORY OF PRESENT ILLNESS:  Benigno Quispe is a 56 y.o. female who returns for cycle 4 chemotherapy.  This is following a one-week delay due to neutropenia.  Unfortunately, the patient remains neutropenic today.  She denies any signs or symptoms of infection, fever or chills.  She has tolerated treatment reasonably well.  The patient is nervous and anxious, quite tearful as her father recently passed away and she was unable to travel to [facility] for the .  She does question if stressed is the reason for her neutropenia.  Her appetite, energy, and oral intake remains stable.  She has occasional mouth sores, which to resolve quickly.  She denies any signs or symptoms of bleeding.    ONCOLOGIC HISTORY:     Malignant neoplasm of rectum    10/6/2016 Biopsy    Upper and lower endoscopy by Dr. Keller:  Rectal mass showing invasive moderately differentiated adenocarcinoma.        10/12/2016 Imaging    CT imaging showed bilateral pulmonary nodules with the largest in the left lower lobe measuring 11 mm, concerning for pulmonary metastases. A mass in the pelvis measured about 4 x 3 cm with a 15 mm lymph node in the right pelvis.      10/31/2016 Surgery    APR with posterior vaginectomy by Brie Clemente and Marisa Burris. Pathology showed a 5.5 cm low-grade well to moderately differentiated adenocarcinoma with 2 of 20 lymph nodes positive for metastatic disease and a positive radial margin.      10/31/2016 Imaging    PET scan:  Multiple bilateral hypermetabolic pulmonary  "nodules likely  represent metastases. The hypermetabolic nodes along both pelvic  sidewalls are suspected to be metastatic. The activity along the anal  canal and the activity within shotty bilateral groin nodes is  indeterminate.      2/13/2017 -  Chemotherapy    OP COLORECTAL FOLFIRI + Bevacizumab (Irinotecan / Leucovorin / Fluorouracil / Bevacizumab)           PAST MEDICAL, SURGICAL, FAMILY, AND SOCIAL HISTORIES were reviewed with the patient and in the electronic medical record, and were updated if indicated.    ALLERGIES:  Allergies   Allergen Reactions   • Adhesive Tape        MEDICATIONS:  The medication list has been reviewed with the patient by the medical assistant, and the list has been updated in the electronic medical record, which I reviewed.  Medication dosages and frequencies were confirmed to be accurate.    REVIEW OF SYSTEMS:  Review of Systems   Constitutional: Negative for activity change, appetite change, chills, diaphoresis, fatigue, fever and unexpected weight change.   Respiratory: Negative for cough, chest tightness and shortness of breath.    Cardiovascular: Negative for chest pain and leg swelling.   Gastrointestinal: Negative for abdominal pain, blood in stool, constipation, diarrhea, nausea and vomiting.   Endocrine: Negative.    Genitourinary: Negative.    Musculoskeletal: Negative for arthralgias, joint swelling and myalgias.   Allergic/Immunologic: Negative.    Neurological: Negative.    Hematological: Negative for adenopathy. Does not bruise/bleed easily.   Psychiatric/Behavioral: The patient is nervous/anxious (father recently passed away).      Objective     Vitals:    04/10/17 1133   BP: 120/70   Pulse: 86   Resp: 16   Temp: 98.2 °F (36.8 °C)   Weight: 156 lb 6.4 oz (70.9 kg)   Height: 63.78\" (162 cm)   PainSc: 0-No pain     GENERAL:  Well-developed, well-nourished female in no acute distress.   SKIN:  Warm, dry without rashes, purpura or petechiae.  CHEST:  Lungs clear to " auscultation. Good airflow. Mediport normal.   CARDIAC:  Regular rate and rhythm without murmurs, rubs or gallops. Normal S1,S2. No edema  ABDOMEN: soft, non-tender, bowel sounds presnt  EXTREMITIES:  No clubbing, cyanosis or edema.  PSYCHIATRIC:  Tearful at times but comments on the support that shoe does have..      DIAGNOSTIC DATA:  Results for orders placed or performed in visit on 04/10/17   Comprehensive metabolic panel   Result Value Ref Range    Glucose 109 74 - 124 mg/dL    BUN 15 6 - 20 mg/dL    Creatinine 0.72 0.60 - 1.10 mg/dL    Sodium 141 134 - 145 mmol/L    Potassium 4.2 3.5 - 4.7 mmol/L    Chloride 103 98 - 107 mmol/L    CO2 25.5 22.0 - 29.0 mmol/L    Calcium 9.3 8.5 - 10.2 mg/dL    Total Protein 7.2 6.3 - 8.0 g/dL    Albumin 4.40 3.50 - 5.20 g/dL    ALT (SGPT) 16 0 - 33 U/L    AST (SGOT) 23 0 - 32 U/L    Alkaline Phosphatase 50 38 - 116 U/L    Total Bilirubin 0.6 0.1 - 1.2 mg/dL    eGFR Non African Amer 84 >60 mL/min/1.73    eGFR  African Amer 102 >60 mL/min/1.73    Globulin 2.8 1.8 - 3.5 gm/dL    A/G Ratio 1.6 1.1 - 2.4 g/dL    BUN/Creatinine Ratio 20.8 7.3 - 30.0    Anion Gap 12.5 mmol/L   Urinalysis   Result Value Ref Range    Color, UA Yellow Yellow, Straw    Appearance, UA Clear Clear    pH, UA 5.5 4.5 - 8.0    Specific Gravity, UA 1.025 1.002 - 1.030    Glucose, UA Negative Negative    Ketones, UA Negative Negative    Bilirubin, UA Negative Negative    Blood, UA Small (1+) (A) Negative    Protein, UA Trace (A) Negative    Leuk Esterase, UA Small (1+) (A) Negative    Nitrite, UA Negative Negative    Urobilinogen, UA 0.2 E.U./dL 0.2 - 1.0 E.U./dL   CBC Auto Differential   Result Value Ref Range    WBC 2.80 (L) 4.00 - 10.00 10*3/mm3    RBC 4.76 3.90 - 5.00 10*6/mm3    Hemoglobin 13.0 11.5 - 14.9 g/dL    Hematocrit 38.5 34.0 - 45.0 %    MCV 80.9 (L) 83.0 - 97.0 fL    MCH 27.3 27.0 - 33.0 pg    MCHC 33.8 32.0 - 35.0 g/dL    RDW 14.7 (H) 11.7 - 14.5 %    RDW-SD 42.6 37.0 - 49.0 fl    MPV 8.9 8.9 -  12.1 fL    Platelets 195 150 - 375 10*3/mm3    Neutrophil % 31.8 (L) 39.0 - 75.0 %    Lymphocyte % 52.9 (H) 20.0 - 49.0 %    Monocyte % 10.0 4.0 - 12.0 %    Eosinophil % 4.6 1.0 - 5.0 %    Basophil % 0.7 0.0 - 1.1 %    Immature Grans % 0.0 0.0 - 0.5 %    Neutrophils, Absolute 0.89 (L) 1.50 - 7.00 10*3/mm3    Lymphocytes, Absolute 1.48 1.00 - 3.50 10*3/mm3    Monocytes, Absolute 0.28 0.25 - 0.80 10*3/mm3    Eosinophils, Absolute 0.13 0.00 - 0.36 10*3/mm3    Basophils, Absolute 0.02 0.00 - 0.10 10*3/mm3    Immature Grans, Absolute 0.00 0.00 - 0.03 10*3/mm3    nRBC 0.0 0.0 - 0.0 /100 WBC   Gold Top - SST   Result Value Ref Range    Extra Tube Hold for add-ons.        Assessment/Plan     ASSESSMENT/PLAN:  This is a 56 y.o. female with:  1.  Metastatic rectal cancer, Kras mutated: She had a resection on 10/31/2016 by Brie Clemente and [place].  Unfortunately, PET scan shows evidence for local lydia metastases as well as bilateral pulmonary metastases.    - Palliative FOLFIRI/Avastin started 2/10/17. Add Avastin with cycle 2.    - Initial chemotherapy was dose reduced (no 5-FU bolus and Irinotecan at 85%) for the first cycle due to neutropenia. Will dose reduce 5-FU (no bolus; dose reduce infusion down to 80%)   -Cycle 4 delayed 1 week due to ANC 0.84.   - Following 1 week delay, the patient remains neutropenic with an ANC of 0.89, unable to proceed with cycle 4.   - Return in 1 week to discuss further dosing with Dr. Paze, pending blood counts   - Plan scans after 4 cycles, but will have to be interpreted with caution since its been almost two months since her initial scans.     2. Vitamin B12 deficiency. B12 injections and oral B12 for time being.  3. Neutropenia before intiation of chemotherapy. Likely secondary to B12 deficiency and chemo. Dose adjustments as above.   4. Cancer related pain. Norco 10 mg every 6 hrs prn.   5. Anxiety. Celexa 20 mg daily. Her father recently passed away which is adding to her anxiety.  6.  Hallucinations. Unclear etiology. Occurred when she was being unhooked. Monitor. Could have been anxiety related. Very short lived.     RTC in 1 week for NP review, and possibly cycle 4, dosing and scheduled to be discussed with Dr. Paez at that time. Plans to repeat scans following cycle 4

## 2017-04-11 ENCOUNTER — APPOINTMENT (OUTPATIENT)
Dept: ONCOLOGY | Facility: HOSPITAL | Age: 57
End: 2017-04-11

## 2017-04-17 ENCOUNTER — APPOINTMENT (OUTPATIENT)
Dept: ONCOLOGY | Facility: HOSPITAL | Age: 57
End: 2017-04-17

## 2017-04-17 ENCOUNTER — INFUSION (OUTPATIENT)
Dept: ONCOLOGY | Facility: HOSPITAL | Age: 57
End: 2017-04-17

## 2017-04-17 ENCOUNTER — APPOINTMENT (OUTPATIENT)
Dept: LAB | Facility: HOSPITAL | Age: 57
End: 2017-04-17

## 2017-04-17 ENCOUNTER — OFFICE VISIT (OUTPATIENT)
Dept: ONCOLOGY | Facility: CLINIC | Age: 57
End: 2017-04-17

## 2017-04-17 VITALS
DIASTOLIC BLOOD PRESSURE: 82 MMHG | HEIGHT: 64 IN | RESPIRATION RATE: 16 BRPM | WEIGHT: 157 LBS | HEART RATE: 76 BPM | SYSTOLIC BLOOD PRESSURE: 120 MMHG | TEMPERATURE: 97.9 F | BODY MASS INDEX: 26.8 KG/M2

## 2017-04-17 DIAGNOSIS — C20 MALIGNANT NEOPLASM OF RECTUM (HCC): Primary | ICD-10-CM

## 2017-04-17 DIAGNOSIS — D70.1 CHEMOTHERAPY-INDUCED NEUTROPENIA (HCC): ICD-10-CM

## 2017-04-17 DIAGNOSIS — Z45.2 FITTING AND ADJUSTMENT OF VASCULAR CATHETER: ICD-10-CM

## 2017-04-17 DIAGNOSIS — G47.00 INSOMNIA, UNSPECIFIED TYPE: ICD-10-CM

## 2017-04-17 DIAGNOSIS — T45.1X5A CHEMOTHERAPY-INDUCED NEUTROPENIA (HCC): ICD-10-CM

## 2017-04-17 LAB
ALBUMIN SERPL-MCNC: 4.2 G/DL (ref 3.5–5.2)
ALBUMIN/GLOB SERPL: 1.5 G/DL (ref 1.1–2.4)
ALP SERPL-CCNC: 46 U/L (ref 38–116)
ALT SERPL W P-5'-P-CCNC: 12 U/L (ref 0–33)
ANION GAP SERPL CALCULATED.3IONS-SCNC: 10.4 MMOL/L
AST SERPL-CCNC: 18 U/L (ref 0–32)
BACTERIA UR QL AUTO: NEGATIVE /HPF
BASOPHILS # BLD AUTO: 0.01 10*3/MM3 (ref 0–0.1)
BASOPHILS NFR BLD AUTO: 0.3 % (ref 0–1.1)
BILIRUB SERPL-MCNC: 0.5 MG/DL (ref 0.1–1.2)
BILIRUB UR QL STRIP: NEGATIVE
BILIRUB UR QL STRIP: NEGATIVE
BUN BLD-MCNC: 14 MG/DL (ref 6–20)
BUN/CREAT SERPL: 21.2 (ref 7.3–30)
CALCIUM SPEC-SCNC: 9 MG/DL (ref 8.5–10.2)
CHLORIDE SERPL-SCNC: 104 MMOL/L (ref 98–107)
CLARITY UR: ABNORMAL
CLARITY UR: ABNORMAL
CO2 SERPL-SCNC: 27.6 MMOL/L (ref 22–29)
COLOR UR: YELLOW
COLOR UR: YELLOW
CREAT BLD-MCNC: 0.66 MG/DL (ref 0.6–1.1)
DEPRECATED RDW RBC AUTO: 43.3 FL (ref 37–49)
EOSINOPHIL # BLD AUTO: 0.11 10*3/MM3 (ref 0–0.36)
EOSINOPHIL NFR BLD AUTO: 3.3 % (ref 1–5)
ERYTHROCYTE [DISTWIDTH] IN BLOOD BY AUTOMATED COUNT: 14.5 % (ref 11.7–14.5)
GFR SERPL CREATININE-BSD FRML MDRD: 112 ML/MIN/1.73
GFR SERPL CREATININE-BSD FRML MDRD: 93 ML/MIN/1.73
GLOBULIN UR ELPH-MCNC: 2.8 GM/DL (ref 1.8–3.5)
GLUCOSE BLD-MCNC: 104 MG/DL (ref 74–124)
GLUCOSE UR STRIP-MCNC: NEGATIVE MG/DL
GLUCOSE UR STRIP-MCNC: NEGATIVE MG/DL
HCT VFR BLD AUTO: 38.4 % (ref 34–45)
HGB BLD-MCNC: 12.8 G/DL (ref 11.5–14.9)
HGB UR QL STRIP.AUTO: ABNORMAL
HGB UR QL STRIP.AUTO: ABNORMAL
HOLD SPECIMEN: NORMAL
IMM GRANULOCYTES # BLD: 0.01 10*3/MM3 (ref 0–0.03)
IMM GRANULOCYTES NFR BLD: 0.3 % (ref 0–0.5)
KETONES UR QL STRIP: NEGATIVE
KETONES UR QL STRIP: NEGATIVE
LEUKOCYTE ESTERASE UR QL STRIP.AUTO: ABNORMAL
LEUKOCYTE ESTERASE UR QL STRIP.AUTO: ABNORMAL
LYMPHOCYTES # BLD AUTO: 1.78 10*3/MM3 (ref 1–3.5)
LYMPHOCYTES NFR BLD AUTO: 53.5 % (ref 20–49)
MCH RBC QN AUTO: 27.6 PG (ref 27–33)
MCHC RBC AUTO-ENTMCNC: 33.3 G/DL (ref 32–35)
MCV RBC AUTO: 82.9 FL (ref 83–97)
MONOCYTES # BLD AUTO: 0.32 10*3/MM3 (ref 0.25–0.8)
MONOCYTES NFR BLD AUTO: 9.6 % (ref 4–12)
NEUTROPHILS # BLD AUTO: 1.1 10*3/MM3 (ref 1.5–7)
NEUTROPHILS NFR BLD AUTO: 33 % (ref 39–75)
NITRITE UR QL STRIP: NEGATIVE
NITRITE UR QL STRIP: NEGATIVE
NRBC BLD MANUAL-RTO: 0 /100 WBC (ref 0–0)
PH UR STRIP.AUTO: <=5 [PH] (ref 4.5–8)
PH UR STRIP.AUTO: <=5 [PH] (ref 4.5–8)
PLATELET # BLD AUTO: 159 10*3/MM3 (ref 150–375)
PMV BLD AUTO: 8.7 FL (ref 8.9–12.1)
POTASSIUM BLD-SCNC: 4.1 MMOL/L (ref 3.5–4.7)
PROT SERPL-MCNC: 7 G/DL (ref 6.3–8)
PROT UR QL STRIP: NEGATIVE
PROT UR QL STRIP: NEGATIVE
RBC # BLD AUTO: 4.63 10*6/MM3 (ref 3.9–5)
RBC # UR: ABNORMAL /HPF
REF LAB TEST METHOD: ABNORMAL
SODIUM BLD-SCNC: 142 MMOL/L (ref 134–145)
SP GR UR STRIP: 1.01 (ref 1–1.03)
SP GR UR STRIP: 1.02 (ref 1–1.03)
SQUAMOUS #/AREA URNS HPF: ABNORMAL /HPF
UROBILINOGEN UR QL STRIP: ABNORMAL
UROBILINOGEN UR QL STRIP: ABNORMAL
WBC CLUMPS # UR AUTO: ABNORMAL /HPF
WBC NRBC COR # BLD: 3.33 10*3/MM3 (ref 4–10)
WBC UR QL AUTO: ABNORMAL /HPF

## 2017-04-17 PROCEDURE — 25010000002 PALONOSETRON PER 25 MCG: Performed by: NURSE PRACTITIONER

## 2017-04-17 PROCEDURE — 25010000002 DEXAMETHASONE SODIUM PHOSPHATE 10 MG/ML SOLUTION 1 ML VIAL: Performed by: NURSE PRACTITIONER

## 2017-04-17 PROCEDURE — 96416 CHEMO PROLONG INFUSE W/PUMP: CPT | Performed by: NURSE PRACTITIONER

## 2017-04-17 PROCEDURE — 25010000002 FLUOROURACIL PER 500 MG: Performed by: NURSE PRACTITIONER

## 2017-04-17 PROCEDURE — 96375 TX/PRO/DX INJ NEW DRUG ADDON: CPT | Performed by: NURSE PRACTITIONER

## 2017-04-17 PROCEDURE — 85025 COMPLETE CBC W/AUTO DIFF WBC: CPT | Performed by: NURSE PRACTITIONER

## 2017-04-17 PROCEDURE — 25010000002 LEUCOVORIN CALCIUM PER 50 MG: Performed by: NURSE PRACTITIONER

## 2017-04-17 PROCEDURE — 81001 URINALYSIS AUTO W/SCOPE: CPT

## 2017-04-17 PROCEDURE — 96415 CHEMO IV INFUSION ADDL HR: CPT | Performed by: NURSE PRACTITIONER

## 2017-04-17 PROCEDURE — 81003 URINALYSIS AUTO W/O SCOPE: CPT | Performed by: NURSE PRACTITIONER

## 2017-04-17 PROCEDURE — 25010000002 BEVACIZUMAB PER 10 MG: Performed by: NURSE PRACTITIONER

## 2017-04-17 PROCEDURE — 99214 OFFICE O/P EST MOD 30 MIN: CPT | Performed by: NURSE PRACTITIONER

## 2017-04-17 PROCEDURE — 25010000002 IRINOTECAN PER 20 MG: Performed by: NURSE PRACTITIONER

## 2017-04-17 PROCEDURE — 80053 COMPREHEN METABOLIC PANEL: CPT | Performed by: NURSE PRACTITIONER

## 2017-04-17 PROCEDURE — 96413 CHEMO IV INFUSION 1 HR: CPT | Performed by: NURSE PRACTITIONER

## 2017-04-17 PROCEDURE — 96368 THER/DIAG CONCURRENT INF: CPT | Performed by: NURSE PRACTITIONER

## 2017-04-17 RX ORDER — PALONOSETRON 0.05 MG/ML
0.25 INJECTION, SOLUTION INTRAVENOUS ONCE
Status: COMPLETED | OUTPATIENT
Start: 2017-04-17 | End: 2017-04-17

## 2017-04-17 RX ORDER — ATROPINE SULFATE 0.4 MG/ML
0.25 AMPUL (ML) INJECTION
Status: DISCONTINUED | OUTPATIENT
Start: 2017-04-17 | End: 2017-04-17 | Stop reason: HOSPADM

## 2017-04-17 RX ORDER — SODIUM CHLORIDE 9 MG/ML
250 INJECTION, SOLUTION INTRAVENOUS ONCE
Status: CANCELLED | OUTPATIENT
Start: 2017-04-17

## 2017-04-17 RX ORDER — ATROPINE SULFATE 1 MG/ML
0.25 INJECTION, SOLUTION INTRAMUSCULAR; INTRAVENOUS; SUBCUTANEOUS
Status: CANCELLED | OUTPATIENT
Start: 2017-04-17

## 2017-04-17 RX ORDER — PALONOSETRON 0.05 MG/ML
0.25 INJECTION, SOLUTION INTRAVENOUS ONCE
Status: CANCELLED | OUTPATIENT
Start: 2017-04-17

## 2017-04-17 RX ORDER — CLONAZEPAM 1 MG/1
1 TABLET ORAL 2 TIMES DAILY PRN
Qty: 60 TABLET | Refills: 0 | Status: SHIPPED | OUTPATIENT
Start: 2017-04-17 | End: 2017-07-17 | Stop reason: SDUPTHER

## 2017-04-17 RX ORDER — SODIUM CHLORIDE 9 MG/ML
250 INJECTION, SOLUTION INTRAVENOUS ONCE
Status: COMPLETED | OUTPATIENT
Start: 2017-04-17 | End: 2017-04-17

## 2017-04-17 RX ADMIN — LEUCOVORIN CALCIUM 710 MG: 350 INJECTION, POWDER, LYOPHILIZED, FOR SOLUTION INTRAMUSCULAR; INTRAVENOUS at 10:13

## 2017-04-17 RX ADMIN — DEXTROSE MONOHYDRATE 270 MG: 5 INJECTION, SOLUTION INTRAVENOUS at 10:13

## 2017-04-17 RX ADMIN — BEVACIZUMAB 360 MG: 400 INJECTION, SOLUTION INTRAVENOUS at 09:34

## 2017-04-17 RX ADMIN — DEXAMETHASONE SODIUM PHOSPHATE 12 MG: 10 INJECTION, SOLUTION INTRAMUSCULAR; INTRAVENOUS at 09:15

## 2017-04-17 RX ADMIN — ATROPINE SULFATE 0.25 MG: 0.4 INJECTION, SOLUTION INTRAMUSCULAR; INTRAVENOUS; SUBCUTANEOUS at 08:58

## 2017-04-17 RX ADMIN — SODIUM CHLORIDE 250 ML: 900 INJECTION, SOLUTION INTRAVENOUS at 09:15

## 2017-04-17 RX ADMIN — FLUOROURACIL 2970 MG: 50 INJECTION, SOLUTION INTRAVENOUS at 11:55

## 2017-04-17 RX ADMIN — PALONOSETRON HYDROCHLORIDE 0.25 MG: 0.25 INJECTION INTRAVENOUS at 09:00

## 2017-04-17 NOTE — PROGRESS NOTES
[facility] GROUP OUTPATIENT FOLLOW UP CLINIC VISIT    REASON FOR FOLLOW-UP:    Malignant neoplasm of rectum    Staging form: Colon and Rectum, AJCC V7      Pathologic: Stage IIIC (T4b, N1b, cM0) - Signed by Jude Stewart MD on 12/9/2016        Staging comments: Suspected lung metastases.        Clinical: Stage TRUDY (T4b, N1b, M1a) - Signed by Jude Stewart MD on 12/19/2016  Kras mutation positive.  BRAF negative.  microsatellite stable.      HISTORY OF PRESENT ILLNESS:  Benigno Quispe is a 56 y.o. female who returns for cycle 4 chemotherapy.  This is following a two-week delay due to neutropenia.  The patient reports she is feeling well.  She denies any fevers or chills, signs or symptoms of infection.  She reports her bowels are moving normally.  Or new pain.  She denies any signs or symptoms of bleeding.  She is requesting a refill of her sleep aid today.    ONCOLOGIC HISTORY:     Malignant neoplasm of rectum    10/6/2016 Biopsy    Upper and lower endoscopy by Dr. Keller:  Rectal mass showing invasive moderately differentiated adenocarcinoma.        10/12/2016 Imaging    CT imaging showed bilateral pulmonary nodules with the largest in the left lower lobe measuring 11 mm, concerning for pulmonary metastases. A mass in the pelvis measured about 4 x 3 cm with a 15 mm lymph node in the right pelvis.      10/31/2016 Surgery    APR with posterior vaginectomy by Brie Clemente and Marisa Burris. Pathology showed a 5.5 cm low-grade well to moderately differentiated adenocarcinoma with 2 of 20 lymph nodes positive for metastatic disease and a positive radial margin.      10/31/2016 Imaging    PET scan:  Multiple bilateral hypermetabolic pulmonary nodules likely  represent metastases. The hypermetabolic nodes along both pelvic  sidewalls are suspected to be metastatic. The activity along the anal  canal and the activity within shotty bilateral groin nodes is  indeterminate.      2/13/2017 -  Chemotherapy    OP  "COLORECTAL FOLFIRI + Bevacizumab (Irinotecan / Leucovorin / Fluorouracil / Bevacizumab)           PAST MEDICAL, SURGICAL, FAMILY, AND SOCIAL HISTORIES were reviewed with the patient and in the electronic medical record, and were updated if indicated.    ALLERGIES:  Allergies   Allergen Reactions   • Adhesive Tape        MEDICATIONS:  The medication list has been reviewed with the patient by the medical assistant, and the list has been updated in the electronic medical record, which I reviewed.  Medication dosages and frequencies were confirmed to be accurate.    REVIEW OF SYSTEMS:  Review of Systems   Constitutional: Negative for activity change, appetite change, chills, diaphoresis, fatigue, fever and unexpected weight change.   Respiratory: Negative for cough, chest tightness and shortness of breath.    Cardiovascular: Negative for chest pain and leg swelling.   Gastrointestinal: Negative for abdominal pain, blood in stool, constipation, diarrhea, nausea and vomiting.   Endocrine: Negative.    Genitourinary: Negative.    Musculoskeletal: Negative for arthralgias, joint swelling and myalgias.   Allergic/Immunologic: Negative.    Neurological: Negative.    Hematological: Negative for adenopathy. Does not bruise/bleed easily.   Psychiatric/Behavioral: The patient is nervous/anxious (father recently passed away).      Objective     Vitals:    04/17/17 0813   BP: 120/82   Pulse: 76   Resp: 16   Temp: 97.9 °F (36.6 °C)   Weight: 157 lb (71.2 kg)   Height: 63.78\" (162 cm)   PainSc: 0-No pain     GENERAL:  Well-developed, well-nourished female in no acute distress.   SKIN:  Warm, dry without rashes, purpura or petechiae.  CHEST:  Lungs clear to auscultation. Good airflow. Mediport normal.   CARDIAC:  Regular rate and rhythm without murmurs, rubs or gallops. Normal S1,S2. No edema  ABDOMEN: soft, non-tender, bowel sounds presnt  EXTREMITIES:  No clubbing, cyanosis or edema.  PSYCHIATRIC:  Good spirits, less tearful.  "     DIAGNOSTIC DATA:  Results for orders placed or performed in visit on 04/17/17   Comprehensive metabolic panel   Result Value Ref Range    Glucose 104 74 - 124 mg/dL    BUN 14 6 - 20 mg/dL    Creatinine 0.66 0.60 - 1.10 mg/dL    Sodium 142 134 - 145 mmol/L    Potassium 4.1 3.5 - 4.7 mmol/L    Chloride 104 98 - 107 mmol/L    CO2 27.6 22.0 - 29.0 mmol/L    Calcium 9.0 8.5 - 10.2 mg/dL    Total Protein 7.0 6.3 - 8.0 g/dL    Albumin 4.20 3.50 - 5.20 g/dL    ALT (SGPT) 12 0 - 33 U/L    AST (SGOT) 18 0 - 32 U/L    Alkaline Phosphatase 46 38 - 116 U/L    Total Bilirubin 0.5 0.1 - 1.2 mg/dL    eGFR Non African Amer 93 >60 mL/min/1.73    eGFR  African Amer 112 >60 mL/min/1.73    Globulin 2.8 1.8 - 3.5 gm/dL    A/G Ratio 1.5 1.1 - 2.4 g/dL    BUN/Creatinine Ratio 21.2 7.3 - 30.0    Anion Gap 10.4 mmol/L   Urinalysis   Result Value Ref Range    Color, UA Yellow Yellow, Straw    Appearance, UA Slightly Cloudy (A) Clear    pH, UA <=5.0 4.5 - 8.0    Specific Gravity, UA 1.015 1.002 - 1.030    Glucose, UA Negative Negative    Ketones, UA Negative Negative    Bilirubin, UA Negative Negative    Blood, UA Small (1+) (A) Negative    Protein, UA Negative Negative    Leuk Esterase, UA Small (1+) (A) Negative    Nitrite, UA Negative Negative    Urobilinogen, UA 0.2 E.U./dL 0.2 - 1.0 E.U./dL   CBC Auto Differential   Result Value Ref Range    WBC 3.33 (L) 4.00 - 10.00 10*3/mm3    RBC 4.63 3.90 - 5.00 10*6/mm3    Hemoglobin 12.8 11.5 - 14.9 g/dL    Hematocrit 38.4 34.0 - 45.0 %    MCV 82.9 (L) 83.0 - 97.0 fL    MCH 27.6 27.0 - 33.0 pg    MCHC 33.3 32.0 - 35.0 g/dL    RDW 14.5 11.7 - 14.5 %    RDW-SD 43.3 37.0 - 49.0 fl    MPV 8.7 (L) 8.9 - 12.1 fL    Platelets 159 150 - 375 10*3/mm3    Neutrophil % 33.0 (L) 39.0 - 75.0 %    Lymphocyte % 53.5 (H) 20.0 - 49.0 %    Monocyte % 9.6 4.0 - 12.0 %    Eosinophil % 3.3 1.0 - 5.0 %    Basophil % 0.3 0.0 - 1.1 %    Immature Grans % 0.3 0.0 - 0.5 %    Neutrophils, Absolute 1.10 (L) 1.50 - 7.00  10*3/mm3    Lymphocytes, Absolute 1.78 1.00 - 3.50 10*3/mm3    Monocytes, Absolute 0.32 0.25 - 0.80 10*3/mm3    Eosinophils, Absolute 0.11 0.00 - 0.36 10*3/mm3    Basophils, Absolute 0.01 0.00 - 0.10 10*3/mm3    Immature Grans, Absolute 0.01 0.00 - 0.03 10*3/mm3    nRBC 0.0 0.0 - 0.0 /100 WBC   Gold Top - SST   Result Value Ref Range    Extra Tube Hold for add-ons.        Assessment/Plan   ASSESSMENT/PLAN:  This is a 56 y.o. female with:  1.  Metastatic rectal cancer, Kras mutated: She had a resection on 10/31/2016 by Brie Clemente and [place].  Unfortunately, PET scan shows evidence for local lydia metastases as well as bilateral pulmonary metastases.    - Palliative FOLFIRI/Avastin started 2/10/17. Add Avastin with cycle 2.    - Initial chemotherapy was dose reduced (no 5-FU bolus and Irinotecan at 85%) for the first cycle due to neutropenia. Cycle 2 reduced 5-FU (no bolus; dose reduce infusion down to 80%)   -Cycle 4 delayed 2 weeks due to neutropenia.   -Proceed with cycle 4 today with further dose reduction to 5-FU (no bolus; dose reduce infusion down to 70%) maintaining Irinotecan at 85%.   - Plan scans after 5 cycles, but will have to be interpreted with caution since its been almost two months since her initial scans.     2. Vitamin B12 deficiency. B12 injections and oral B12 for time being.    3. Neutropenia before intiation of chemotherapy. Likely secondary to B12 deficiency and chemo.   -Cycle 4 delayed 2 weeks due to neutropenia.    - Granix to be given Day 8,9 following cycle 4 with dose adjustments as outlined above.    4. Cancer related pain. Norco 10 mg every 6 hrs prn.     5. Anxiety. Celexa 20 mg daily. Her father recently passed away which is adding to her anxiety.    6. Insomnia. Klonopin 1mg PRN #60 no refills provided today.    The patient will return in 2 weeks for cycle 5 FOLFRI/Avastin.  She will undergo CT scans in 3 weeks following cycle 5 to evaluate disease state.  She'll follow-up with   [facility] in 4 weeks, prior to cycle 6 for CT scan review.  Today's plan was reviewed with Dr. Paez who is in agreement.    Latesha Monroe, APRN  04/17/2017

## 2017-04-19 ENCOUNTER — INFUSION (OUTPATIENT)
Dept: ONCOLOGY | Facility: HOSPITAL | Age: 57
End: 2017-04-19

## 2017-04-19 DIAGNOSIS — Z45.2 FITTING AND ADJUSTMENT OF VASCULAR CATHETER: ICD-10-CM

## 2017-04-19 DIAGNOSIS — C20 MALIGNANT NEOPLASM OF RECTUM (HCC): Primary | ICD-10-CM

## 2017-04-19 PROCEDURE — 96523 IRRIG DRUG DELIVERY DEVICE: CPT | Performed by: NURSE PRACTITIONER

## 2017-04-19 PROCEDURE — 25010000002 HEPARIN FLUSH (PORCINE) 100 UNIT/ML SOLUTION: Performed by: INTERNAL MEDICINE

## 2017-04-19 RX ORDER — SODIUM CHLORIDE 0.9 % (FLUSH) 0.9 %
10 SYRINGE (ML) INJECTION AS NEEDED
Status: CANCELLED | OUTPATIENT
Start: 2017-04-19

## 2017-04-19 RX ORDER — SODIUM CHLORIDE 0.9 % (FLUSH) 0.9 %
10 SYRINGE (ML) INJECTION AS NEEDED
Status: DISCONTINUED | OUTPATIENT
Start: 2017-04-19 | End: 2017-04-19 | Stop reason: HOSPADM

## 2017-04-19 RX ADMIN — Medication 10 ML: at 10:11

## 2017-04-19 RX ADMIN — SODIUM CHLORIDE, PRESERVATIVE FREE 500 UNITS: 5 INJECTION INTRAVENOUS at 10:12

## 2017-04-24 ENCOUNTER — INFUSION (OUTPATIENT)
Dept: ONCOLOGY | Facility: HOSPITAL | Age: 57
End: 2017-04-24

## 2017-04-24 ENCOUNTER — APPOINTMENT (OUTPATIENT)
Dept: LAB | Facility: HOSPITAL | Age: 57
End: 2017-04-24

## 2017-04-24 ENCOUNTER — APPOINTMENT (OUTPATIENT)
Dept: ONCOLOGY | Facility: HOSPITAL | Age: 57
End: 2017-04-24

## 2017-04-24 ENCOUNTER — APPOINTMENT (OUTPATIENT)
Dept: ONCOLOGY | Facility: CLINIC | Age: 57
End: 2017-04-24

## 2017-04-24 DIAGNOSIS — T45.1X5A CHEMOTHERAPY-INDUCED NEUTROPENIA (HCC): ICD-10-CM

## 2017-04-24 DIAGNOSIS — C20 MALIGNANT NEOPLASM OF RECTUM (HCC): Primary | ICD-10-CM

## 2017-04-24 DIAGNOSIS — D70.1 CHEMOTHERAPY-INDUCED NEUTROPENIA (HCC): ICD-10-CM

## 2017-04-24 PROCEDURE — 25010000002 TBO-FILGRASTIM 480 MCG/0.8ML SOLUTION PREFILLED SYRINGE: Performed by: NURSE PRACTITIONER

## 2017-04-24 PROCEDURE — 96372 THER/PROPH/DIAG INJ SC/IM: CPT | Performed by: NURSE PRACTITIONER

## 2017-04-24 RX ADMIN — TBO-FILGRASTIM 480 MCG: 480 INJECTION, SOLUTION SUBCUTANEOUS at 14:48

## 2017-04-25 ENCOUNTER — INFUSION (OUTPATIENT)
Dept: ONCOLOGY | Facility: HOSPITAL | Age: 57
End: 2017-04-25

## 2017-04-25 DIAGNOSIS — C20 MALIGNANT NEOPLASM OF RECTUM (HCC): Primary | ICD-10-CM

## 2017-04-25 DIAGNOSIS — D70.1 CHEMOTHERAPY-INDUCED NEUTROPENIA (HCC): ICD-10-CM

## 2017-04-25 DIAGNOSIS — T45.1X5A CHEMOTHERAPY-INDUCED NEUTROPENIA (HCC): ICD-10-CM

## 2017-04-25 DIAGNOSIS — C20 MALIGNANT NEOPLASM OF RECTUM (HCC): ICD-10-CM

## 2017-04-25 PROCEDURE — 25010000002 TBO-FILGRASTIM 480 MCG/0.8ML SOLUTION PREFILLED SYRINGE: Performed by: NURSE PRACTITIONER

## 2017-04-25 PROCEDURE — 96372 THER/PROPH/DIAG INJ SC/IM: CPT | Performed by: NURSE PRACTITIONER

## 2017-04-25 RX ADMIN — TBO-FILGRASTIM 480 MCG: 480 INJECTION, SOLUTION SUBCUTANEOUS at 12:14

## 2017-05-01 ENCOUNTER — INFUSION (OUTPATIENT)
Dept: ONCOLOGY | Facility: HOSPITAL | Age: 57
End: 2017-05-01

## 2017-05-01 ENCOUNTER — APPOINTMENT (OUTPATIENT)
Dept: ONCOLOGY | Facility: HOSPITAL | Age: 57
End: 2017-05-01

## 2017-05-01 DIAGNOSIS — Z45.2 FITTING AND ADJUSTMENT OF VASCULAR CATHETER: Primary | ICD-10-CM

## 2017-05-01 DIAGNOSIS — C20 MALIGNANT NEOPLASM OF RECTUM (HCC): Primary | ICD-10-CM

## 2017-05-01 LAB
ALBUMIN SERPL-MCNC: 4 G/DL (ref 3.5–5.2)
ALBUMIN/GLOB SERPL: 1.4 G/DL (ref 1.1–2.4)
ALP SERPL-CCNC: 54 U/L (ref 38–116)
ALT SERPL W P-5'-P-CCNC: 32 U/L (ref 0–33)
ANION GAP SERPL CALCULATED.3IONS-SCNC: 12.8 MMOL/L
AST SERPL-CCNC: 28 U/L (ref 0–32)
BASOPHILS # BLD AUTO: 0.02 10*3/MM3 (ref 0–0.1)
BASOPHILS NFR BLD AUTO: 0.7 % (ref 0–1.1)
BILIRUB SERPL-MCNC: 0.3 MG/DL (ref 0.1–1.2)
BILIRUB UR QL STRIP: NEGATIVE
BUN BLD-MCNC: 15 MG/DL (ref 6–20)
BUN/CREAT SERPL: 20.3 (ref 7.3–30)
CALCIUM SPEC-SCNC: 9.5 MG/DL (ref 8.5–10.2)
CHLORIDE SERPL-SCNC: 104 MMOL/L (ref 98–107)
CLARITY UR: ABNORMAL
CO2 SERPL-SCNC: 27.2 MMOL/L (ref 22–29)
COLOR UR: YELLOW
CREAT BLD-MCNC: 0.74 MG/DL (ref 0.6–1.1)
DEPRECATED RDW RBC AUTO: 42.5 FL (ref 37–49)
EOSINOPHIL # BLD AUTO: 0.09 10*3/MM3 (ref 0–0.36)
EOSINOPHIL NFR BLD AUTO: 3 % (ref 1–5)
ERYTHROCYTE [DISTWIDTH] IN BLOOD BY AUTOMATED COUNT: 14.3 % (ref 11.7–14.5)
GFR SERPL CREATININE-BSD FRML MDRD: 81 ML/MIN/1.73
GFR SERPL CREATININE-BSD FRML MDRD: 98 ML/MIN/1.73
GLOBULIN UR ELPH-MCNC: 2.8 GM/DL (ref 1.8–3.5)
GLUCOSE BLD-MCNC: 116 MG/DL (ref 74–124)
GLUCOSE UR STRIP-MCNC: NEGATIVE MG/DL
HCT VFR BLD AUTO: 36.9 % (ref 34–45)
HGB BLD-MCNC: 12.3 G/DL (ref 11.5–14.9)
HGB UR QL STRIP.AUTO: ABNORMAL
HOLD SPECIMEN: NORMAL
IMM GRANULOCYTES # BLD: 0.11 10*3/MM3 (ref 0–0.03)
IMM GRANULOCYTES NFR BLD: 3.7 % (ref 0–0.5)
KETONES UR QL STRIP: NEGATIVE
LEUKOCYTE ESTERASE UR QL STRIP.AUTO: ABNORMAL
LYMPHOCYTES # BLD AUTO: 1.69 10*3/MM3 (ref 1–3.5)
LYMPHOCYTES NFR BLD AUTO: 56.9 % (ref 20–49)
MCH RBC QN AUTO: 28 PG (ref 27–33)
MCHC RBC AUTO-ENTMCNC: 33.3 G/DL (ref 32–35)
MCV RBC AUTO: 83.9 FL (ref 83–97)
MONOCYTES # BLD AUTO: 0.44 10*3/MM3 (ref 0.25–0.8)
MONOCYTES NFR BLD AUTO: 14.8 % (ref 4–12)
NEUTROPHILS # BLD AUTO: 0.62 10*3/MM3 (ref 1.5–7)
NEUTROPHILS NFR BLD AUTO: 20.9 % (ref 39–75)
NITRITE UR QL STRIP: NEGATIVE
NRBC BLD MANUAL-RTO: 0 /100 WBC (ref 0–0)
PH UR STRIP.AUTO: 5.5 [PH] (ref 4.5–8)
PLATELET # BLD AUTO: 165 10*3/MM3 (ref 150–375)
PMV BLD AUTO: 8.7 FL (ref 8.9–12.1)
POTASSIUM BLD-SCNC: 4.2 MMOL/L (ref 3.5–4.7)
PROT SERPL-MCNC: 6.8 G/DL (ref 6.3–8)
PROT UR QL STRIP: NEGATIVE
RBC # BLD AUTO: 4.4 10*6/MM3 (ref 3.9–5)
SODIUM BLD-SCNC: 144 MMOL/L (ref 134–145)
SP GR UR STRIP: 1.02 (ref 1–1.03)
UROBILINOGEN UR QL STRIP: ABNORMAL
WBC NRBC COR # BLD: 2.97 10*3/MM3 (ref 4–10)

## 2017-05-01 PROCEDURE — 96523 IRRIG DRUG DELIVERY DEVICE: CPT | Performed by: INTERNAL MEDICINE

## 2017-05-01 PROCEDURE — 80053 COMPREHEN METABOLIC PANEL: CPT

## 2017-05-01 PROCEDURE — 25010000002 HEPARIN FLUSH (PORCINE) 100 UNIT/ML SOLUTION: Performed by: INTERNAL MEDICINE

## 2017-05-01 PROCEDURE — 85025 COMPLETE CBC W/AUTO DIFF WBC: CPT

## 2017-05-01 PROCEDURE — 81003 URINALYSIS AUTO W/O SCOPE: CPT

## 2017-05-01 RX ORDER — SODIUM CHLORIDE 0.9 % (FLUSH) 0.9 %
10 SYRINGE (ML) INJECTION AS NEEDED
Status: DISCONTINUED | OUTPATIENT
Start: 2017-05-01 | End: 2017-05-01 | Stop reason: HOSPADM

## 2017-05-01 RX ORDER — HYDROCODONE BITARTRATE AND ACETAMINOPHEN 10; 325 MG/1; MG/1
1 TABLET ORAL EVERY 6 HOURS PRN
Qty: 120 TABLET | Refills: 0 | Status: SHIPPED | OUTPATIENT
Start: 2017-05-01 | End: 2017-06-19 | Stop reason: SDUPTHER

## 2017-05-01 RX ORDER — SODIUM CHLORIDE 0.9 % (FLUSH) 0.9 %
10 SYRINGE (ML) INJECTION AS NEEDED
Status: CANCELLED | OUTPATIENT
Start: 2017-05-01

## 2017-05-01 RX ADMIN — Medication 10 ML: at 08:48

## 2017-05-01 RX ADMIN — SODIUM CHLORIDE, PRESERVATIVE FREE 500 UNITS: 5 INJECTION INTRAVENOUS at 08:49

## 2017-05-03 DIAGNOSIS — C20 MALIGNANT NEOPLASM OF RECTUM (HCC): ICD-10-CM

## 2017-05-08 ENCOUNTER — APPOINTMENT (OUTPATIENT)
Dept: ONCOLOGY | Facility: HOSPITAL | Age: 57
End: 2017-05-08

## 2017-05-08 ENCOUNTER — LAB (OUTPATIENT)
Dept: LAB | Facility: HOSPITAL | Age: 57
End: 2017-05-08

## 2017-05-08 DIAGNOSIS — T45.1X5A CHEMOTHERAPY-INDUCED NEUTROPENIA (HCC): ICD-10-CM

## 2017-05-08 DIAGNOSIS — C20 MALIGNANT NEOPLASM OF RECTUM (HCC): ICD-10-CM

## 2017-05-08 DIAGNOSIS — D70.1 CHEMOTHERAPY-INDUCED NEUTROPENIA (HCC): ICD-10-CM

## 2017-05-10 ENCOUNTER — HOSPITAL ENCOUNTER (OUTPATIENT)
Dept: PET IMAGING | Facility: HOSPITAL | Age: 57
Discharge: HOME | End: 2017-05-10
Attending: INTERNAL MEDICINE | Admitting: INTERNAL MEDICINE

## 2017-05-10 ENCOUNTER — INFUSION (OUTPATIENT)
Dept: ONCOLOGY | Facility: HOSPITAL | Age: 57
End: 2017-05-10

## 2017-05-10 DIAGNOSIS — C20 MALIGNANT NEOPLASM OF RECTUM (HCC): ICD-10-CM

## 2017-05-10 DIAGNOSIS — Z45.2 FITTING AND ADJUSTMENT OF VASCULAR CATHETER: Primary | ICD-10-CM

## 2017-05-10 LAB — CREAT BLDA-MCNC: 0.7 MG/DL (ref 0.6–1.3)

## 2017-05-10 PROCEDURE — 25010000002 HEPARIN FLUSH (PORCINE) 100 UNIT/ML SOLUTION: Performed by: INTERNAL MEDICINE

## 2017-05-10 PROCEDURE — 74177 CT ABD & PELVIS W/CONTRAST: CPT

## 2017-05-10 PROCEDURE — 96523 IRRIG DRUG DELIVERY DEVICE: CPT | Performed by: INTERNAL MEDICINE

## 2017-05-10 PROCEDURE — 82565 ASSAY OF CREATININE: CPT

## 2017-05-10 PROCEDURE — 71260 CT THORAX DX C+: CPT

## 2017-05-10 PROCEDURE — 0 DIATRIZOATE MEGLUMINE & SODIUM PER 1 ML: Performed by: INTERNAL MEDICINE

## 2017-05-10 PROCEDURE — 0 IOPAMIDOL 61 % SOLUTION: Performed by: INTERNAL MEDICINE

## 2017-05-10 RX ORDER — SODIUM CHLORIDE 0.9 % (FLUSH) 0.9 %
10 SYRINGE (ML) INJECTION AS NEEDED
Status: CANCELLED | OUTPATIENT
Start: 2017-05-10

## 2017-05-10 RX ORDER — SODIUM CHLORIDE 0.9 % (FLUSH) 0.9 %
10 SYRINGE (ML) INJECTION AS NEEDED
Status: DISCONTINUED | OUTPATIENT
Start: 2017-05-10 | End: 2017-05-10 | Stop reason: HOSPADM

## 2017-05-10 RX ADMIN — DIATRIZOATE MEGLUMINE AND DIATRIZOATE SODIUM 30 ML: 660; 100 LIQUID ORAL; RECTAL at 07:41

## 2017-05-10 RX ADMIN — IOPAMIDOL 85 ML: 612 INJECTION, SOLUTION INTRAVENOUS at 08:30

## 2017-05-10 RX ADMIN — Medication 10 ML: at 07:52

## 2017-05-10 RX ADMIN — SODIUM CHLORIDE, PRESERVATIVE FREE 500 UNITS: 5 INJECTION INTRAVENOUS at 07:52

## 2017-05-15 ENCOUNTER — INFUSION (OUTPATIENT)
Dept: ONCOLOGY | Facility: HOSPITAL | Age: 57
End: 2017-05-15

## 2017-05-15 ENCOUNTER — OFFICE VISIT (OUTPATIENT)
Dept: ONCOLOGY | Facility: CLINIC | Age: 57
End: 2017-05-15

## 2017-05-15 ENCOUNTER — APPOINTMENT (OUTPATIENT)
Dept: ONCOLOGY | Facility: HOSPITAL | Age: 57
End: 2017-05-15

## 2017-05-15 VITALS
WEIGHT: 163 LBS | HEART RATE: 76 BPM | SYSTOLIC BLOOD PRESSURE: 130 MMHG | DIASTOLIC BLOOD PRESSURE: 72 MMHG | RESPIRATION RATE: 16 BRPM | TEMPERATURE: 97.6 F | BODY MASS INDEX: 27.83 KG/M2 | HEIGHT: 64 IN

## 2017-05-15 DIAGNOSIS — T45.1X5A CHEMOTHERAPY-INDUCED NEUTROPENIA (HCC): ICD-10-CM

## 2017-05-15 DIAGNOSIS — D70.1 CHEMOTHERAPY-INDUCED NEUTROPENIA (HCC): ICD-10-CM

## 2017-05-15 DIAGNOSIS — Z45.2 FITTING AND ADJUSTMENT OF VASCULAR CATHETER: ICD-10-CM

## 2017-05-15 DIAGNOSIS — C20 MALIGNANT NEOPLASM OF RECTUM (HCC): Primary | ICD-10-CM

## 2017-05-15 DIAGNOSIS — D51.9 ANEMIA DUE TO VITAMIN B12 DEFICIENCY, UNSPECIFIED B12 DEFICIENCY TYPE: ICD-10-CM

## 2017-05-15 LAB
ALBUMIN SERPL-MCNC: 4.1 G/DL (ref 3.5–5.2)
ALBUMIN/GLOB SERPL: 1.4 G/DL (ref 1.1–2.4)
ALP SERPL-CCNC: 51 U/L (ref 38–116)
ALT SERPL W P-5'-P-CCNC: 17 U/L (ref 0–33)
ANION GAP SERPL CALCULATED.3IONS-SCNC: 11.7 MMOL/L
AST SERPL-CCNC: 23 U/L (ref 0–32)
BASOPHILS # BLD AUTO: 0.02 10*3/MM3 (ref 0–0.1)
BASOPHILS NFR BLD AUTO: 0.7 % (ref 0–1.1)
BILIRUB SERPL-MCNC: 0.3 MG/DL (ref 0.1–1.2)
BILIRUB UR QL STRIP: NEGATIVE
BUN BLD-MCNC: 13 MG/DL (ref 6–20)
BUN/CREAT SERPL: 20 (ref 7.3–30)
CALCIUM SPEC-SCNC: 9.2 MG/DL (ref 8.5–10.2)
CHLORIDE SERPL-SCNC: 102 MMOL/L (ref 98–107)
CLARITY UR: CLEAR
CO2 SERPL-SCNC: 28.3 MMOL/L (ref 22–29)
COLOR UR: YELLOW
CREAT BLD-MCNC: 0.65 MG/DL (ref 0.6–1.1)
DEPRECATED RDW RBC AUTO: 43.1 FL (ref 37–49)
EOSINOPHIL # BLD AUTO: 0.06 10*3/MM3 (ref 0–0.36)
EOSINOPHIL NFR BLD AUTO: 2 % (ref 1–5)
ERYTHROCYTE [DISTWIDTH] IN BLOOD BY AUTOMATED COUNT: 13.9 % (ref 11.7–14.5)
GFR SERPL CREATININE-BSD FRML MDRD: 114 ML/MIN/1.73
GFR SERPL CREATININE-BSD FRML MDRD: 94 ML/MIN/1.73
GLOBULIN UR ELPH-MCNC: 3 GM/DL (ref 1.8–3.5)
GLUCOSE BLD-MCNC: 97 MG/DL (ref 74–124)
GLUCOSE UR STRIP-MCNC: NEGATIVE MG/DL
HCT VFR BLD AUTO: 39.1 % (ref 34–45)
HGB BLD-MCNC: 12.8 G/DL (ref 11.5–14.9)
HGB UR QL STRIP.AUTO: ABNORMAL
HOLD SPECIMEN: NORMAL
IMM GRANULOCYTES # BLD: 0.01 10*3/MM3 (ref 0–0.03)
IMM GRANULOCYTES NFR BLD: 0.3 % (ref 0–0.5)
KETONES UR QL STRIP: NEGATIVE
LEUKOCYTE ESTERASE UR QL STRIP.AUTO: ABNORMAL
LYMPHOCYTES # BLD AUTO: 1.51 10*3/MM3 (ref 1–3.5)
LYMPHOCYTES NFR BLD AUTO: 50.5 % (ref 20–49)
MCH RBC QN AUTO: 27.6 PG (ref 27–33)
MCHC RBC AUTO-ENTMCNC: 32.7 G/DL (ref 32–35)
MCV RBC AUTO: 84.3 FL (ref 83–97)
MONOCYTES # BLD AUTO: 0.29 10*3/MM3 (ref 0.25–0.8)
MONOCYTES NFR BLD AUTO: 9.7 % (ref 4–12)
NEUTROPHILS # BLD AUTO: 1.1 10*3/MM3 (ref 1.5–7)
NEUTROPHILS NFR BLD AUTO: 36.8 % (ref 39–75)
NITRITE UR QL STRIP: NEGATIVE
NRBC BLD MANUAL-RTO: 0 /100 WBC (ref 0–0)
PH UR STRIP.AUTO: 5.5 [PH] (ref 4.5–8)
PLATELET # BLD AUTO: 192 10*3/MM3 (ref 150–375)
PMV BLD AUTO: 8.8 FL (ref 8.9–12.1)
POTASSIUM BLD-SCNC: 4.2 MMOL/L (ref 3.5–4.7)
PROT SERPL-MCNC: 7.1 G/DL (ref 6.3–8)
PROT UR QL STRIP: NEGATIVE
RBC # BLD AUTO: 4.64 10*6/MM3 (ref 3.9–5)
SODIUM BLD-SCNC: 142 MMOL/L (ref 134–145)
SP GR UR STRIP: 1.01 (ref 1–1.03)
UROBILINOGEN UR QL STRIP: ABNORMAL
WBC NRBC COR # BLD: 2.99 10*3/MM3 (ref 4–10)

## 2017-05-15 PROCEDURE — 80053 COMPREHEN METABOLIC PANEL: CPT | Performed by: INTERNAL MEDICINE

## 2017-05-15 PROCEDURE — 25010000002 HEPARIN FLUSH (PORCINE) 100 UNIT/ML SOLUTION: Performed by: INTERNAL MEDICINE

## 2017-05-15 PROCEDURE — 99215 OFFICE O/P EST HI 40 MIN: CPT | Performed by: INTERNAL MEDICINE

## 2017-05-15 PROCEDURE — 81003 URINALYSIS AUTO W/O SCOPE: CPT | Performed by: INTERNAL MEDICINE

## 2017-05-15 PROCEDURE — 96523 IRRIG DRUG DELIVERY DEVICE: CPT | Performed by: INTERNAL MEDICINE

## 2017-05-15 PROCEDURE — 85025 COMPLETE CBC W/AUTO DIFF WBC: CPT | Performed by: INTERNAL MEDICINE

## 2017-05-15 RX ORDER — CYANOCOBALAMIN 1000 UG/ML
1000 INJECTION, SOLUTION INTRAMUSCULAR; SUBCUTANEOUS ONCE
Status: CANCELLED | OUTPATIENT
Start: 2017-05-22

## 2017-05-15 RX ORDER — SODIUM CHLORIDE 9 MG/ML
250 INJECTION, SOLUTION INTRAVENOUS ONCE
Status: CANCELLED | OUTPATIENT
Start: 2017-06-05

## 2017-05-15 RX ORDER — DEXTROSE MONOHYDRATE 50 MG/ML
250 INJECTION, SOLUTION INTRAVENOUS ONCE
Status: CANCELLED | OUTPATIENT
Start: 2017-06-05

## 2017-05-15 RX ORDER — SODIUM CHLORIDE 9 MG/ML
250 INJECTION, SOLUTION INTRAVENOUS ONCE
Status: CANCELLED | OUTPATIENT
Start: 2017-05-23

## 2017-05-15 RX ORDER — PALONOSETRON 0.05 MG/ML
0.25 INJECTION, SOLUTION INTRAVENOUS ONCE
Status: CANCELLED | OUTPATIENT
Start: 2017-06-05

## 2017-05-15 RX ORDER — SODIUM CHLORIDE 0.9 % (FLUSH) 0.9 %
10 SYRINGE (ML) INJECTION AS NEEDED
Status: DISCONTINUED | OUTPATIENT
Start: 2017-05-15 | End: 2017-05-15 | Stop reason: HOSPADM

## 2017-05-15 RX ORDER — DEXTROSE MONOHYDRATE 50 MG/ML
250 INJECTION, SOLUTION INTRAVENOUS ONCE
Status: CANCELLED | OUTPATIENT
Start: 2017-05-23

## 2017-05-15 RX ORDER — PALONOSETRON 0.05 MG/ML
0.25 INJECTION, SOLUTION INTRAVENOUS ONCE
Status: CANCELLED | OUTPATIENT
Start: 2017-05-23

## 2017-05-15 RX ORDER — SODIUM CHLORIDE 0.9 % (FLUSH) 0.9 %
10 SYRINGE (ML) INJECTION AS NEEDED
Status: CANCELLED | OUTPATIENT
Start: 2017-05-15

## 2017-05-15 RX ADMIN — Medication 10 ML: at 09:21

## 2017-05-15 RX ADMIN — SODIUM CHLORIDE, PRESERVATIVE FREE 500 UNITS: 5 INJECTION INTRAVENOUS at 09:22

## 2017-05-23 ENCOUNTER — INFUSION (OUTPATIENT)
Dept: ONCOLOGY | Facility: HOSPITAL | Age: 57
End: 2017-05-23

## 2017-05-23 VITALS
BODY MASS INDEX: 28.31 KG/M2 | TEMPERATURE: 98 F | SYSTOLIC BLOOD PRESSURE: 131 MMHG | DIASTOLIC BLOOD PRESSURE: 84 MMHG | HEART RATE: 79 BPM | WEIGHT: 163.8 LBS

## 2017-05-23 DIAGNOSIS — C20 MALIGNANT NEOPLASM OF RECTUM (HCC): Primary | ICD-10-CM

## 2017-05-23 DIAGNOSIS — C20 MALIGNANT NEOPLASM OF RECTUM (HCC): ICD-10-CM

## 2017-05-23 LAB
ALBUMIN SERPL-MCNC: 4.1 G/DL (ref 3.5–5.2)
ALBUMIN/GLOB SERPL: 1.3 G/DL (ref 1.1–2.4)
ALP SERPL-CCNC: 50 U/L (ref 38–116)
ALT SERPL W P-5'-P-CCNC: 14 U/L (ref 0–33)
ANION GAP SERPL CALCULATED.3IONS-SCNC: 13.6 MMOL/L
AST SERPL-CCNC: 18 U/L (ref 0–32)
BASOPHILS # BLD AUTO: 0.03 10*3/MM3 (ref 0–0.1)
BASOPHILS NFR BLD AUTO: 0.7 % (ref 0–1.1)
BILIRUB SERPL-MCNC: 0.4 MG/DL (ref 0.1–1.2)
BILIRUB UR QL STRIP: NEGATIVE
BUN BLD-MCNC: 13 MG/DL (ref 6–20)
BUN/CREAT SERPL: 18.1 (ref 7.3–30)
CALCIUM SPEC-SCNC: 9.1 MG/DL (ref 8.5–10.2)
CEA SERPL-MCNC: 24.48 NG/ML
CHLORIDE SERPL-SCNC: 102 MMOL/L (ref 98–107)
CLARITY UR: CLEAR
CO2 SERPL-SCNC: 27.4 MMOL/L (ref 22–29)
COLOR UR: YELLOW
CREAT BLD-MCNC: 0.72 MG/DL (ref 0.6–1.1)
DEPRECATED RDW RBC AUTO: 41.6 FL (ref 37–49)
EOSINOPHIL # BLD AUTO: 0.11 10*3/MM3 (ref 0–0.36)
EOSINOPHIL NFR BLD AUTO: 2.6 % (ref 1–5)
ERYTHROCYTE [DISTWIDTH] IN BLOOD BY AUTOMATED COUNT: 13.5 % (ref 11.7–14.5)
GFR SERPL CREATININE-BSD FRML MDRD: 102 ML/MIN/1.73
GFR SERPL CREATININE-BSD FRML MDRD: 84 ML/MIN/1.73
GLOBULIN UR ELPH-MCNC: 3.1 GM/DL (ref 1.8–3.5)
GLUCOSE BLD-MCNC: 93 MG/DL (ref 74–124)
GLUCOSE UR STRIP-MCNC: NEGATIVE MG/DL
HCT VFR BLD AUTO: 38.9 % (ref 34–45)
HGB BLD-MCNC: 13 G/DL (ref 11.5–14.9)
HGB UR QL STRIP.AUTO: ABNORMAL
IMM GRANULOCYTES # BLD: 0 10*3/MM3 (ref 0–0.03)
IMM GRANULOCYTES NFR BLD: 0 % (ref 0–0.5)
KETONES UR QL STRIP: NEGATIVE
LEUKOCYTE ESTERASE UR QL STRIP.AUTO: ABNORMAL
LYMPHOCYTES # BLD AUTO: 1.97 10*3/MM3 (ref 1–3.5)
LYMPHOCYTES NFR BLD AUTO: 46.1 % (ref 20–49)
MCH RBC QN AUTO: 28.1 PG (ref 27–33)
MCHC RBC AUTO-ENTMCNC: 33.4 G/DL (ref 32–35)
MCV RBC AUTO: 84.2 FL (ref 83–97)
MONOCYTES # BLD AUTO: 0.4 10*3/MM3 (ref 0.25–0.8)
MONOCYTES NFR BLD AUTO: 9.4 % (ref 4–12)
NEUTROPHILS # BLD AUTO: 1.76 10*3/MM3 (ref 1.5–7)
NEUTROPHILS NFR BLD AUTO: 41.2 % (ref 39–75)
NITRITE UR QL STRIP: NEGATIVE
NRBC BLD MANUAL-RTO: 0 /100 WBC (ref 0–0)
PH UR STRIP.AUTO: 5.5 [PH] (ref 4.5–8)
PLATELET # BLD AUTO: 196 10*3/MM3 (ref 150–375)
PMV BLD AUTO: 9 FL (ref 8.9–12.1)
POTASSIUM BLD-SCNC: 4.1 MMOL/L (ref 3.5–4.7)
PROT SERPL-MCNC: 7.2 G/DL (ref 6.3–8)
PROT UR QL STRIP: NEGATIVE
RBC # BLD AUTO: 4.62 10*6/MM3 (ref 3.9–5)
SODIUM BLD-SCNC: 143 MMOL/L (ref 134–145)
SP GR UR STRIP: 1.02 (ref 1–1.03)
UROBILINOGEN UR QL STRIP: ABNORMAL
WBC NRBC COR # BLD: 4.27 10*3/MM3 (ref 4–10)

## 2017-05-23 PROCEDURE — 25010000002 FLUOROURACIL PER 500 MG: Performed by: INTERNAL MEDICINE

## 2017-05-23 PROCEDURE — 25010000002 BEVACIZUMAB PER 10 MG: Performed by: INTERNAL MEDICINE

## 2017-05-23 PROCEDURE — 96415 CHEMO IV INFUSION ADDL HR: CPT | Performed by: INTERNAL MEDICINE

## 2017-05-23 PROCEDURE — 25010000002 LEUCOVORIN CALCIUM PER 50 MG: Performed by: INTERNAL MEDICINE

## 2017-05-23 PROCEDURE — 25010000002 PALONOSETRON PER 25 MCG: Performed by: INTERNAL MEDICINE

## 2017-05-23 PROCEDURE — 25010000002 OXALIPLATIN PER 0.5 MG: Performed by: INTERNAL MEDICINE

## 2017-05-23 PROCEDURE — 96375 TX/PRO/DX INJ NEW DRUG ADDON: CPT | Performed by: INTERNAL MEDICINE

## 2017-05-23 PROCEDURE — 81003 URINALYSIS AUTO W/O SCOPE: CPT | Performed by: NURSE PRACTITIONER

## 2017-05-23 PROCEDURE — 96368 THER/DIAG CONCURRENT INF: CPT | Performed by: INTERNAL MEDICINE

## 2017-05-23 PROCEDURE — 96417 CHEMO IV INFUS EACH ADDL SEQ: CPT | Performed by: INTERNAL MEDICINE

## 2017-05-23 PROCEDURE — 80053 COMPREHEN METABOLIC PANEL: CPT | Performed by: NURSE PRACTITIONER

## 2017-05-23 PROCEDURE — 96416 CHEMO PROLONG INFUSE W/PUMP: CPT | Performed by: INTERNAL MEDICINE

## 2017-05-23 PROCEDURE — 85025 COMPLETE CBC W/AUTO DIFF WBC: CPT | Performed by: NURSE PRACTITIONER

## 2017-05-23 PROCEDURE — 82378 CARCINOEMBRYONIC ANTIGEN: CPT | Performed by: INTERNAL MEDICINE

## 2017-05-23 PROCEDURE — 25010000002 DEXAMETHASONE PER 1 MG: Performed by: INTERNAL MEDICINE

## 2017-05-23 PROCEDURE — 36415 COLL VENOUS BLD VENIPUNCTURE: CPT | Performed by: NURSE PRACTITIONER

## 2017-05-23 PROCEDURE — 96413 CHEMO IV INFUSION 1 HR: CPT | Performed by: INTERNAL MEDICINE

## 2017-05-23 RX ORDER — SODIUM CHLORIDE 9 MG/ML
250 INJECTION, SOLUTION INTRAVENOUS ONCE
Status: COMPLETED | OUTPATIENT
Start: 2017-05-23 | End: 2017-05-23

## 2017-05-23 RX ORDER — DEXTROSE MONOHYDRATE 50 MG/ML
250 INJECTION, SOLUTION INTRAVENOUS ONCE
Status: COMPLETED | OUTPATIENT
Start: 2017-05-23 | End: 2017-05-23

## 2017-05-23 RX ORDER — PALONOSETRON 0.05 MG/ML
0.25 INJECTION, SOLUTION INTRAVENOUS ONCE
Status: COMPLETED | OUTPATIENT
Start: 2017-05-23 | End: 2017-05-23

## 2017-05-23 RX ADMIN — DEXTROSE MONOHYDRATE 250 ML: 5 INJECTION, SOLUTION INTRAVENOUS at 09:59

## 2017-05-23 RX ADMIN — SODIUM CHLORIDE 250 ML: 900 INJECTION, SOLUTION INTRAVENOUS at 09:00

## 2017-05-23 RX ADMIN — OXALIPLATIN 115 MG: 100 INJECTION, SOLUTION, CONCENTRATE INTRAVENOUS at 10:02

## 2017-05-23 RX ADMIN — BEVACIZUMAB 370 MG: 400 INJECTION, SOLUTION INTRAVENOUS at 09:20

## 2017-05-23 RX ADMIN — PALONOSETRON HYDROCHLORIDE 0.25 MG: 0.25 INJECTION INTRAVENOUS at 09:00

## 2017-05-23 RX ADMIN — FLUOROURACIL 3220 MG: 50 INJECTION, SOLUTION INTRAVENOUS at 12:04

## 2017-05-23 RX ADMIN — DEXAMETHASONE SODIUM PHOSPHATE 12 MG: 10 INJECTION INTRAMUSCULAR; INTRAVENOUS at 09:03

## 2017-05-23 RX ADMIN — LEUCOVORIN CALCIUM 540 MG: 350 INJECTION, POWDER, LYOPHILIZED, FOR SOLUTION INTRAMUSCULAR; INTRAVENOUS at 10:02

## 2017-05-25 ENCOUNTER — INFUSION (OUTPATIENT)
Dept: ONCOLOGY | Facility: HOSPITAL | Age: 57
End: 2017-05-25

## 2017-05-25 DIAGNOSIS — Z45.2 FITTING AND ADJUSTMENT OF VASCULAR CATHETER: Primary | ICD-10-CM

## 2017-05-25 PROCEDURE — 25010000002 HEPARIN FLUSH (PORCINE) 100 UNIT/ML SOLUTION: Performed by: INTERNAL MEDICINE

## 2017-05-25 PROCEDURE — 96523 IRRIG DRUG DELIVERY DEVICE: CPT | Performed by: INTERNAL MEDICINE

## 2017-05-25 RX ORDER — SODIUM CHLORIDE 0.9 % (FLUSH) 0.9 %
10 SYRINGE (ML) INJECTION AS NEEDED
Status: CANCELLED | OUTPATIENT
Start: 2017-05-25

## 2017-05-25 RX ORDER — SODIUM CHLORIDE 0.9 % (FLUSH) 0.9 %
10 SYRINGE (ML) INJECTION AS NEEDED
Status: DISCONTINUED | OUTPATIENT
Start: 2017-05-25 | End: 2017-05-25 | Stop reason: HOSPADM

## 2017-05-25 RX ADMIN — SODIUM CHLORIDE, PRESERVATIVE FREE 500 UNITS: 5 INJECTION INTRAVENOUS at 10:30

## 2017-05-25 RX ADMIN — Medication 10 ML: at 10:30

## 2017-05-30 ENCOUNTER — INFUSION (OUTPATIENT)
Dept: ONCOLOGY | Facility: HOSPITAL | Age: 57
End: 2017-05-30

## 2017-05-30 ENCOUNTER — OFFICE VISIT (OUTPATIENT)
Dept: ONCOLOGY | Facility: CLINIC | Age: 57
End: 2017-05-30

## 2017-05-30 VITALS
HEART RATE: 86 BPM | OXYGEN SATURATION: 97 % | SYSTOLIC BLOOD PRESSURE: 138 MMHG | HEIGHT: 64 IN | DIASTOLIC BLOOD PRESSURE: 78 MMHG | RESPIRATION RATE: 14 BRPM | TEMPERATURE: 97.6 F | BODY MASS INDEX: 28 KG/M2 | WEIGHT: 164 LBS

## 2017-05-30 DIAGNOSIS — C20 MALIGNANT NEOPLASM OF RECTUM (HCC): Primary | ICD-10-CM

## 2017-05-30 DIAGNOSIS — Z45.2 FITTING AND ADJUSTMENT OF VASCULAR CATHETER: ICD-10-CM

## 2017-05-30 DIAGNOSIS — D70.1 CHEMOTHERAPY-INDUCED NEUTROPENIA (HCC): ICD-10-CM

## 2017-05-30 DIAGNOSIS — T45.1X5A CHEMOTHERAPY-INDUCED NEUTROPENIA (HCC): ICD-10-CM

## 2017-05-30 LAB
ALBUMIN SERPL-MCNC: 3.7 G/DL (ref 3.5–5.2)
ALBUMIN/GLOB SERPL: 1.2 G/DL (ref 1.1–2.4)
ALP SERPL-CCNC: 49 U/L (ref 38–116)
ALT SERPL W P-5'-P-CCNC: 16 U/L (ref 0–33)
ANION GAP SERPL CALCULATED.3IONS-SCNC: 11.8 MMOL/L
AST SERPL-CCNC: 19 U/L (ref 0–32)
BASOPHILS # BLD AUTO: 0.02 10*3/MM3 (ref 0–0.1)
BASOPHILS NFR BLD AUTO: 0.6 % (ref 0–1.1)
BILIRUB SERPL-MCNC: 0.2 MG/DL (ref 0.1–1.2)
BUN BLD-MCNC: 16 MG/DL (ref 6–20)
BUN/CREAT SERPL: 22.2 (ref 7.3–30)
CALCIUM SPEC-SCNC: 8.5 MG/DL (ref 8.5–10.2)
CHLORIDE SERPL-SCNC: 102 MMOL/L (ref 98–107)
CO2 SERPL-SCNC: 26.2 MMOL/L (ref 22–29)
CREAT BLD-MCNC: 0.72 MG/DL (ref 0.6–1.1)
DEPRECATED RDW RBC AUTO: 39.7 FL (ref 37–49)
EOSINOPHIL # BLD AUTO: 0.12 10*3/MM3 (ref 0–0.36)
EOSINOPHIL NFR BLD AUTO: 3.3 % (ref 1–5)
ERYTHROCYTE [DISTWIDTH] IN BLOOD BY AUTOMATED COUNT: 13.2 % (ref 11.7–14.5)
GFR SERPL CREATININE-BSD FRML MDRD: 102 ML/MIN/1.73
GFR SERPL CREATININE-BSD FRML MDRD: 84 ML/MIN/1.73
GLOBULIN UR ELPH-MCNC: 3.1 GM/DL (ref 1.8–3.5)
GLUCOSE BLD-MCNC: 93 MG/DL (ref 74–124)
HCT VFR BLD AUTO: 36.1 % (ref 34–45)
HGB BLD-MCNC: 12.3 G/DL (ref 11.5–14.9)
HOLD SPECIMEN: NORMAL
IMM GRANULOCYTES # BLD: 0.01 10*3/MM3 (ref 0–0.03)
IMM GRANULOCYTES NFR BLD: 0.3 % (ref 0–0.5)
LYMPHOCYTES # BLD AUTO: 1.69 10*3/MM3 (ref 1–3.5)
LYMPHOCYTES NFR BLD AUTO: 46.7 % (ref 20–49)
MCH RBC QN AUTO: 28.7 PG (ref 27–33)
MCHC RBC AUTO-ENTMCNC: 34.1 G/DL (ref 32–35)
MCV RBC AUTO: 84.3 FL (ref 83–97)
MONOCYTES # BLD AUTO: 0.31 10*3/MM3 (ref 0.25–0.8)
MONOCYTES NFR BLD AUTO: 8.6 % (ref 4–12)
NEUTROPHILS # BLD AUTO: 1.47 10*3/MM3 (ref 1.5–7)
NEUTROPHILS NFR BLD AUTO: 40.5 % (ref 39–75)
NRBC BLD MANUAL-RTO: 0 /100 WBC (ref 0–0)
PLATELET # BLD AUTO: 145 10*3/MM3 (ref 150–375)
PMV BLD AUTO: 8.8 FL (ref 8.9–12.1)
POTASSIUM BLD-SCNC: 3.9 MMOL/L (ref 3.5–4.7)
PROT SERPL-MCNC: 6.8 G/DL (ref 6.3–8)
RBC # BLD AUTO: 4.28 10*6/MM3 (ref 3.9–5)
SODIUM BLD-SCNC: 140 MMOL/L (ref 134–145)
WBC NRBC COR # BLD: 3.62 10*3/MM3 (ref 4–10)

## 2017-05-30 PROCEDURE — 96372 THER/PROPH/DIAG INJ SC/IM: CPT | Performed by: NURSE PRACTITIONER

## 2017-05-30 PROCEDURE — 85025 COMPLETE CBC W/AUTO DIFF WBC: CPT

## 2017-05-30 PROCEDURE — 80053 COMPREHEN METABOLIC PANEL: CPT

## 2017-05-30 PROCEDURE — 99213 OFFICE O/P EST LOW 20 MIN: CPT | Performed by: NURSE PRACTITIONER

## 2017-05-30 PROCEDURE — 25010000002 TBO-FILGRASTIM 480 MCG/0.8ML SOLUTION PREFILLED SYRINGE: Performed by: INTERNAL MEDICINE

## 2017-05-30 PROCEDURE — 25010000002 HEPARIN FLUSH (PORCINE) 100 UNIT/ML SOLUTION: Performed by: NURSE PRACTITIONER

## 2017-05-30 PROCEDURE — 36415 COLL VENOUS BLD VENIPUNCTURE: CPT

## 2017-05-30 PROCEDURE — 96523 IRRIG DRUG DELIVERY DEVICE: CPT | Performed by: NURSE PRACTITIONER

## 2017-05-30 RX ORDER — SODIUM CHLORIDE 0.9 % (FLUSH) 0.9 %
10 SYRINGE (ML) INJECTION AS NEEDED
Status: ACTIVE | OUTPATIENT
Start: 2017-05-30

## 2017-05-30 RX ORDER — SODIUM CHLORIDE 0.9 % (FLUSH) 0.9 %
10 SYRINGE (ML) INJECTION AS NEEDED
Status: CANCELLED | OUTPATIENT
Start: 2017-05-30

## 2017-05-30 RX ADMIN — Medication 10 ML: at 08:31

## 2017-05-30 RX ADMIN — TBO-FILGRASTIM 480 MCG: 480 INJECTION, SOLUTION SUBCUTANEOUS at 09:19

## 2017-05-30 RX ADMIN — SODIUM CHLORIDE, PRESERVATIVE FREE 500 UNITS: 5 INJECTION INTRAVENOUS at 08:31

## 2017-05-31 ENCOUNTER — APPOINTMENT (OUTPATIENT)
Dept: ONCOLOGY | Facility: HOSPITAL | Age: 57
End: 2017-05-31

## 2017-05-31 ENCOUNTER — INFUSION (OUTPATIENT)
Dept: ONCOLOGY | Facility: HOSPITAL | Age: 57
End: 2017-05-31

## 2017-05-31 VITALS — TEMPERATURE: 98.3 F

## 2017-05-31 DIAGNOSIS — C20 MALIGNANT NEOPLASM OF RECTUM (HCC): Primary | ICD-10-CM

## 2017-05-31 PROCEDURE — 96372 THER/PROPH/DIAG INJ SC/IM: CPT | Performed by: NURSE PRACTITIONER

## 2017-05-31 PROCEDURE — 25010000002 TBO-FILGRASTIM 480 MCG/0.8ML SOLUTION PREFILLED SYRINGE: Performed by: INTERNAL MEDICINE

## 2017-05-31 RX ADMIN — TBO-FILGRASTIM 480 MCG: 480 INJECTION, SOLUTION SUBCUTANEOUS at 15:56

## 2017-06-01 ENCOUNTER — APPOINTMENT (OUTPATIENT)
Dept: ONCOLOGY | Facility: HOSPITAL | Age: 57
End: 2017-06-01

## 2017-06-01 ENCOUNTER — INFUSION (OUTPATIENT)
Dept: ONCOLOGY | Facility: HOSPITAL | Age: 57
End: 2017-06-01

## 2017-06-01 VITALS — TEMPERATURE: 98 F

## 2017-06-01 DIAGNOSIS — C20 MALIGNANT NEOPLASM OF RECTUM (HCC): Primary | ICD-10-CM

## 2017-06-01 PROCEDURE — 96372 THER/PROPH/DIAG INJ SC/IM: CPT | Performed by: NURSE PRACTITIONER

## 2017-06-01 PROCEDURE — 25010000002 TBO-FILGRASTIM 480 MCG/0.8ML SOLUTION PREFILLED SYRINGE: Performed by: INTERNAL MEDICINE

## 2017-06-01 RX ADMIN — TBO-FILGRASTIM 480 MCG: 480 INJECTION, SOLUTION SUBCUTANEOUS at 15:47

## 2017-06-02 ENCOUNTER — APPOINTMENT (OUTPATIENT)
Dept: ONCOLOGY | Facility: HOSPITAL | Age: 57
End: 2017-06-02

## 2017-06-05 ENCOUNTER — INFUSION (OUTPATIENT)
Dept: ONCOLOGY | Facility: HOSPITAL | Age: 57
End: 2017-06-05

## 2017-06-05 ENCOUNTER — OFFICE VISIT (OUTPATIENT)
Dept: ONCOLOGY | Facility: CLINIC | Age: 57
End: 2017-06-05

## 2017-06-05 VITALS
BODY MASS INDEX: 28.61 KG/M2 | HEIGHT: 64 IN | DIASTOLIC BLOOD PRESSURE: 80 MMHG | OXYGEN SATURATION: 96 % | WEIGHT: 167.6 LBS | HEART RATE: 80 BPM | TEMPERATURE: 98 F | SYSTOLIC BLOOD PRESSURE: 146 MMHG | RESPIRATION RATE: 18 BRPM

## 2017-06-05 DIAGNOSIS — C20 MALIGNANT NEOPLASM OF RECTUM (HCC): Primary | ICD-10-CM

## 2017-06-05 LAB
ALBUMIN SERPL-MCNC: 3.7 G/DL (ref 3.5–5.2)
ALBUMIN/GLOB SERPL: 1.3 G/DL (ref 1.1–2.4)
ALP SERPL-CCNC: 61 U/L (ref 38–116)
ALT SERPL W P-5'-P-CCNC: 23 U/L (ref 0–33)
ANION GAP SERPL CALCULATED.3IONS-SCNC: 11.7 MMOL/L
AST SERPL-CCNC: 30 U/L (ref 0–32)
BASOPHILS # BLD AUTO: 0.06 10*3/MM3 (ref 0–0.1)
BASOPHILS NFR BLD AUTO: 1.4 % (ref 0–1.1)
BILIRUB SERPL-MCNC: 0.3 MG/DL (ref 0.1–1.2)
BILIRUB UR QL STRIP: NEGATIVE
BUN BLD-MCNC: 11 MG/DL (ref 6–20)
BUN/CREAT SERPL: 15.1 (ref 7.3–30)
CALCIUM SPEC-SCNC: 8.8 MG/DL (ref 8.5–10.2)
CHLORIDE SERPL-SCNC: 104 MMOL/L (ref 98–107)
CLARITY UR: CLEAR
CO2 SERPL-SCNC: 27.3 MMOL/L (ref 22–29)
COLOR UR: ABNORMAL
CREAT BLD-MCNC: 0.73 MG/DL (ref 0.6–1.1)
DEPRECATED RDW RBC AUTO: 41.2 FL (ref 37–49)
EOSINOPHIL # BLD AUTO: 0.1 10*3/MM3 (ref 0–0.36)
EOSINOPHIL NFR BLD AUTO: 2.3 % (ref 1–5)
ERYTHROCYTE [DISTWIDTH] IN BLOOD BY AUTOMATED COUNT: 13.6 % (ref 11.7–14.5)
GFR SERPL CREATININE-BSD FRML MDRD: 100 ML/MIN/1.73
GFR SERPL CREATININE-BSD FRML MDRD: 82 ML/MIN/1.73
GLOBULIN UR ELPH-MCNC: 2.9 GM/DL (ref 1.8–3.5)
GLUCOSE BLD-MCNC: 98 MG/DL (ref 74–124)
GLUCOSE UR STRIP-MCNC: NEGATIVE MG/DL
HCT VFR BLD AUTO: 34.7 % (ref 34–45)
HGB BLD-MCNC: 11.8 G/DL (ref 11.5–14.9)
HGB UR QL STRIP.AUTO: ABNORMAL
HOLD SPECIMEN: NORMAL
IMM GRANULOCYTES # BLD: 0.45 10*3/MM3 (ref 0–0.03)
IMM GRANULOCYTES NFR BLD: 10.5 % (ref 0–0.5)
KETONES UR QL STRIP: NEGATIVE
LEUKOCYTE ESTERASE UR QL STRIP.AUTO: ABNORMAL
LYMPHOCYTES # BLD AUTO: 1.86 10*3/MM3 (ref 1–3.5)
LYMPHOCYTES NFR BLD AUTO: 43.5 % (ref 20–49)
MCH RBC QN AUTO: 29 PG (ref 27–33)
MCHC RBC AUTO-ENTMCNC: 34 G/DL (ref 32–35)
MCV RBC AUTO: 85.3 FL (ref 83–97)
MONOCYTES # BLD AUTO: 0.59 10*3/MM3 (ref 0.25–0.8)
MONOCYTES NFR BLD AUTO: 13.8 % (ref 4–12)
NEUTROPHILS # BLD AUTO: 1.22 10*3/MM3 (ref 1.5–7)
NEUTROPHILS NFR BLD AUTO: 28.5 % (ref 39–75)
NITRITE UR QL STRIP: NEGATIVE
NRBC BLD MANUAL-RTO: 0 /100 WBC (ref 0–0)
PH UR STRIP.AUTO: 5.5 [PH] (ref 4.5–8)
PLATELET # BLD AUTO: 160 10*3/MM3 (ref 150–375)
PMV BLD AUTO: 8.4 FL (ref 8.9–12.1)
POTASSIUM BLD-SCNC: 4 MMOL/L (ref 3.5–4.7)
PROT SERPL-MCNC: 6.6 G/DL (ref 6.3–8)
PROT UR QL STRIP: NEGATIVE
RBC # BLD AUTO: 4.07 10*6/MM3 (ref 3.9–5)
SODIUM BLD-SCNC: 143 MMOL/L (ref 134–145)
SP GR UR STRIP: 1.01 (ref 1–1.03)
UROBILINOGEN UR QL STRIP: ABNORMAL
WBC NRBC COR # BLD: 4.28 10*3/MM3 (ref 4–10)

## 2017-06-05 PROCEDURE — 85025 COMPLETE CBC W/AUTO DIFF WBC: CPT | Performed by: NURSE PRACTITIONER

## 2017-06-05 PROCEDURE — 96413 CHEMO IV INFUSION 1 HR: CPT | Performed by: NURSE PRACTITIONER

## 2017-06-05 PROCEDURE — 25010000002 LEUCOVORIN CALCIUM PER 50 MG: Performed by: INTERNAL MEDICINE

## 2017-06-05 PROCEDURE — 25010000002 BEVACIZUMAB PER 10 MG: Performed by: INTERNAL MEDICINE

## 2017-06-05 PROCEDURE — 25010000002 DEXAMETHASONE PER 1 MG: Performed by: INTERNAL MEDICINE

## 2017-06-05 PROCEDURE — 96368 THER/DIAG CONCURRENT INF: CPT | Performed by: NURSE PRACTITIONER

## 2017-06-05 PROCEDURE — 96417 CHEMO IV INFUS EACH ADDL SEQ: CPT | Performed by: NURSE PRACTITIONER

## 2017-06-05 PROCEDURE — 25010000002 OXALIPLATIN PER 0.5 MG: Performed by: INTERNAL MEDICINE

## 2017-06-05 PROCEDURE — 99212-NC PR NO CHARGE CBC OFFICE OUTPATIENT VISIT 10 MINUTES: Performed by: NURSE PRACTITIONER

## 2017-06-05 PROCEDURE — 96375 TX/PRO/DX INJ NEW DRUG ADDON: CPT | Performed by: NURSE PRACTITIONER

## 2017-06-05 PROCEDURE — 81003 URINALYSIS AUTO W/O SCOPE: CPT | Performed by: NURSE PRACTITIONER

## 2017-06-05 PROCEDURE — 96416 CHEMO PROLONG INFUSE W/PUMP: CPT | Performed by: NURSE PRACTITIONER

## 2017-06-05 PROCEDURE — 96415 CHEMO IV INFUSION ADDL HR: CPT | Performed by: NURSE PRACTITIONER

## 2017-06-05 PROCEDURE — 25010000002 FLUOROURACIL PER 500 MG: Performed by: INTERNAL MEDICINE

## 2017-06-05 PROCEDURE — 25010000002 PALONOSETRON PER 25 MCG: Performed by: INTERNAL MEDICINE

## 2017-06-05 PROCEDURE — 80053 COMPREHEN METABOLIC PANEL: CPT | Performed by: NURSE PRACTITIONER

## 2017-06-05 RX ORDER — PALONOSETRON 0.05 MG/ML
0.25 INJECTION, SOLUTION INTRAVENOUS ONCE
Status: COMPLETED | OUTPATIENT
Start: 2017-06-05 | End: 2017-06-05

## 2017-06-05 RX ORDER — SODIUM CHLORIDE 9 MG/ML
250 INJECTION, SOLUTION INTRAVENOUS ONCE
Status: COMPLETED | OUTPATIENT
Start: 2017-06-05 | End: 2017-06-05

## 2017-06-05 RX ORDER — DEXTROSE MONOHYDRATE 50 MG/ML
250 INJECTION, SOLUTION INTRAVENOUS ONCE
Status: COMPLETED | OUTPATIENT
Start: 2017-06-05 | End: 2017-06-05

## 2017-06-05 RX ADMIN — FLUOROURACIL 3220 MG: 50 INJECTION, SOLUTION INTRAVENOUS at 12:52

## 2017-06-05 RX ADMIN — OXALIPLATIN 115 MG: 5 INJECTION, SOLUTION INTRAVENOUS at 10:50

## 2017-06-05 RX ADMIN — BEVACIZUMAB 370 MG: 400 INJECTION, SOLUTION INTRAVENOUS at 10:13

## 2017-06-05 RX ADMIN — PALONOSETRON HYDROCHLORIDE 0.25 MG: 0.25 INJECTION INTRAVENOUS at 09:51

## 2017-06-05 RX ADMIN — SODIUM CHLORIDE 250 ML: 900 INJECTION, SOLUTION INTRAVENOUS at 09:40

## 2017-06-05 RX ADMIN — DEXTROSE MONOHYDRATE 250 ML: 5 INJECTION, SOLUTION INTRAVENOUS at 10:48

## 2017-06-05 RX ADMIN — LEUCOVORIN CALCIUM 540 MG: 350 INJECTION, POWDER, LYOPHILIZED, FOR SOLUTION INTRAMUSCULAR; INTRAVENOUS at 10:50

## 2017-06-05 RX ADMIN — DEXAMETHASONE SODIUM PHOSPHATE 12 MG: 10 INJECTION INTRAMUSCULAR; INTRAVENOUS at 09:52

## 2017-06-05 NOTE — PROGRESS NOTES
[facility] GROUP OUTPATIENT FOLLOW UP CLINIC VISIT    REASON FOR FOLLOW-UP:    Malignant neoplasm of rectum    Staging form: Colon and Rectum, AJCC V7      Pathologic: Stage IIIC (T4b, N1b, cM0) - Signed by Jude Stewart MD on 12/9/2016        Staging comments: Suspected lung metastases.        Clinical: Stage TRUDY (T4b, N1b, M1a) - Signed by Jude Stewart MD on 12/19/2016  Kras mutation positive.  BRAF negative.  Microsatellite stable.      HISTORY OF PRESENT ILLNESS:  Benigno Quispe is a 56 y.o. female who returns for C2 FOLFOX today.  She is feeling well today.  She had 3 days of Granix this cycle with no complaints of pain.  She denies fevers.  She has had some fatigue but otherwise feels well.    ONCOLOGIC HISTORY:     Malignant neoplasm of rectum    10/6/2016 Biopsy    Upper and lower endoscopy by Dr. Keller:  Rectal mass showing invasive moderately differentiated adenocarcinoma.        10/12/2016 Imaging    CT imaging showed bilateral pulmonary nodules with the largest in the left lower lobe measuring 11 mm, concerning for pulmonary metastases. A mass in the pelvis measured about 4 x 3 cm with a 15 mm lymph node in the right pelvis.      10/31/2016 Surgery    APR with posterior vaginectomy by Brie Clemente and Marisa Burris. Pathology showed a 5.5 cm low-grade well to moderately differentiated adenocarcinoma with 2 of 20 lymph nodes positive for metastatic disease and a positive radial margin.      10/31/2016 Imaging    PET scan:  Multiple bilateral hypermetabolic pulmonary nodules likely  represent metastases. The hypermetabolic nodes along both pelvic  sidewalls are suspected to be metastatic. The activity along the anal  canal and the activity within shotty bilateral groin nodes is  indeterminate.      2/13/2017 -  Chemotherapy    OP COLORECTAL FOLFIRI + Bevacizumab (Irinotecan / Leucovorin / Fluorouracil / Bevacizumab)           PAST MEDICAL, SURGICAL, FAMILY, AND SOCIAL HISTORIES were reviewed  "with the patient and in the electronic medical record, and were updated if indicated.    ALLERGIES:  Allergies   Allergen Reactions   • Adhesive Tape        MEDICATIONS:  The medication list has been reviewed with the patient by the medical assistant, and the list has been updated in the electronic medical record, which I reviewed.  Medication dosages and frequencies were confirmed to be accurate.    REVIEW OF SYSTEMS:  Review of Systems   Constitutional: Positive for fatigue. Negative for activity change, appetite change, chills, diaphoresis, fever and unexpected weight change.   Respiratory: Negative for cough, chest tightness and shortness of breath.    Cardiovascular: Negative for chest pain and leg swelling.   Gastrointestinal: Negative for abdominal pain, blood in stool, constipation, diarrhea, nausea and vomiting.   Endocrine: Negative.    Genitourinary: Negative.    Musculoskeletal: Negative for arthralgias, joint swelling and myalgias.   Allergic/Immunologic: Negative.    Neurological: Negative.    Hematological: Negative for adenopathy. Does not bruise/bleed easily.   Psychiatric/Behavioral: Negative for agitation, confusion and dysphoric mood. The patient is not nervous/anxious.      Objective     Vitals:    06/05/17 0848   BP: 146/80   Pulse: 80   Resp: 18   Temp: 98 °F (36.7 °C)   SpO2: 96%   Weight: 167 lb 9.6 oz (76 kg)   Height: 63.78\" (162 cm)   PainSc: 0-No pain     GENERAL:  Well-developed, well-nourished female in no acute distress.   SKIN:  Warm, dry without rashes, purpura or petechiae. Pale  HEAD:  Normocephalic.  EYES:EOMs intact.  Conjunctivae normal.  EARS:  Hearing intact.  NOSE:  Septum midline.  No excoriations or nasal discharge.  MOUTH:  Tongue is well-papillated; no ulcers.  Lips normal.  THROAT:  Oropharynx without lesions or exudates.  NECK:  Supple with good range of motion; no thyromegaly or masses  LYMPHATICS:  No cervical, supraclavicular adenopathy.  CHEST:  Lungs clear to " percussion and auscultation. Good airflow.  CARDIAC:  Regular rate and rhythm without murmurs, rubs or gallops. Normal S1,S2.  ABDOMEN:  Soft, nontender, no hepatosplenomegaly, normal bowel sounds  EXTREMITIES:  No clubbing, cyanosis or edema.  NEUROLOGICAL:  Cranial Nerves II-XII grossly intact.  No focal neurological deficits.  PSYCHIATRIC:  Normal affect and mood.      DIAGNOSTIC DATA:  Results for orders placed or performed in visit on 06/05/17   Comprehensive metabolic panel   Result Value Ref Range    Glucose 98 74 - 124 mg/dL    BUN 11 6 - 20 mg/dL    Creatinine 0.73 0.60 - 1.10 mg/dL    Sodium 143 134 - 145 mmol/L    Potassium 4.0 3.5 - 4.7 mmol/L    Chloride 104 98 - 107 mmol/L    CO2 27.3 22.0 - 29.0 mmol/L    Calcium 8.8 8.5 - 10.2 mg/dL    Total Protein 6.6 6.3 - 8.0 g/dL    Albumin 3.70 3.50 - 5.20 g/dL    ALT (SGPT) 23 0 - 33 U/L    AST (SGOT) 30 0 - 32 U/L    Alkaline Phosphatase 61 38 - 116 U/L    Total Bilirubin 0.3 0.1 - 1.2 mg/dL    eGFR Non African Amer 82 >60 mL/min/1.73    eGFR  African Amer 100 >60 mL/min/1.73    Globulin 2.9 1.8 - 3.5 gm/dL    A/G Ratio 1.3 1.1 - 2.4 g/dL    BUN/Creatinine Ratio 15.1 7.3 - 30.0    Anion Gap 11.7 mmol/L   Urinalysis   Result Value Ref Range    Color, UA Straw Yellow, Straw    Appearance, UA Clear Clear    pH, UA 5.5 4.5 - 8.0    Specific Gravity, UA 1.015 1.002 - 1.030    Glucose, UA Negative Negative    Ketones, UA Negative Negative    Bilirubin, UA Negative Negative    Blood, UA Small (1+) (A) Negative    Protein, UA Negative Negative    Leuk Esterase, UA Trace (A) Negative    Nitrite, UA Negative Negative    Urobilinogen, UA 0.2 E.U./dL 0.2 - 1.0 E.U./dL   CBC Auto Differential   Result Value Ref Range    WBC 4.28 4.00 - 10.00 10*3/mm3    RBC 4.07 3.90 - 5.00 10*6/mm3    Hemoglobin 11.8 11.5 - 14.9 g/dL    Hematocrit 34.7 34.0 - 45.0 %    MCV 85.3 83.0 - 97.0 fL    MCH 29.0 27.0 - 33.0 pg    MCHC 34.0 32.0 - 35.0 g/dL    RDW 13.6 11.7 - 14.5 %    RDW-SD  41.2 37.0 - 49.0 fl    MPV 8.4 (L) 8.9 - 12.1 fL    Platelets 160 150 - 375 10*3/mm3    Neutrophil % 28.5 (L) 39.0 - 75.0 %    Lymphocyte % 43.5 20.0 - 49.0 %    Monocyte % 13.8 (H) 4.0 - 12.0 %    Eosinophil % 2.3 1.0 - 5.0 %    Basophil % 1.4 (H) 0.0 - 1.1 %    Immature Grans % 10.5 (H) 0.0 - 0.5 %    Neutrophils, Absolute 1.22 (L) 1.50 - 7.00 10*3/mm3    Lymphocytes, Absolute 1.86 1.00 - 3.50 10*3/mm3    Monocytes, Absolute 0.59 0.25 - 0.80 10*3/mm3    Eosinophils, Absolute 0.10 0.00 - 0.36 10*3/mm3    Basophils, Absolute 0.06 0.00 - 0.10 10*3/mm3    Immature Grans, Absolute 0.45 (H) 0.00 - 0.03 10*3/mm3    nRBC 0.0 0.0 - 0.0 /100 WBC   Gold Top - SST   Result Value Ref Range    Extra Tube Hold for add-ons.      Ct Chest With Contrast    Result Date: 5/11/2017  Narrative: CT SCAN OF THE CHEST ABDOMEN AND PELVIS WITH CONTRAST  CLINICAL HISTORY: Follow-up metastatic rectal carcinoma. Chemotherapy. Restaging.  TECHNIQUE: Spiral CT images were acquired through the chest, abdomen and pelvis with oral and IV contrast and were reconstructed in 3 mm thick axial slices.  COMPARISON: CT scans of the chest abdomen and pelvis dated 10/12/2016.  FINDINGS: Again seen are multiple small well-circumscribed noncalcified pulmonary nodules of varying size throughout both lungs consistent with hematogenous pulmonary metastases. Several small new metastatic lesions are identified in both lungs. This includes a 6 mm diameter nodule in the posterior aspect of the right upper lobe and a 4 mm diameter nodule in the lateral aspect of the left upper lobe and a 5 mm diameter nodule in the right lower lobe and also a similar sized metastasis in the medial aspect of the right middle lobe. All of the pre-existing metastases show slight interval increase in size. The largest metastatic lesion is a pleural-based metastasis in the lateral aspect of the left lower lobe that now measures up to 1.4 cm in diameter. Previously it measured 1.1 cm in  greatest transverse diameter. A 1.0 cm diameter metastasis in the medial aspect of the right lower lobe measured only 0.5 cm in diameter on the previous CT scan. There are no parenchymal infiltrates or pleural effusions. There is no mediastinal or hilar or axillary lymphadenopathy.  The liver and spleen and pancreas and kidneys and adrenal glands appear within normal limits. No lymphadenopathy is identified in the abdomen . The patient is status post AP resection. A colostomy site is present in the left lower quadrant. No abnormal soft tissues identified in the previous location of the rectum. There is a 10 mm diameter enlarged lymph node anterior to the right piriformis muscle that appears unchanged.      Impression: Multiple bilateral pulmonary nodules consistent with hematogenous metastases showing slight interval increase in size and number of lesions since a preceding CT chest dated 10/12/2016. There is no evidence of metastatic disease within the abdomen. A solitary mildly enlarged lymph node in the right hemipelvis is stable.  This report was finalized on 5/11/2017 7:42 AM by Dr. Macho Dominique MD.      Ct Abdomen Pelvis With Contrast    Result Date: 5/11/2017  Narrative: CT SCAN OF THE CHEST ABDOMEN AND PELVIS WITH CONTRAST  CLINICAL HISTORY: Follow-up metastatic rectal carcinoma. Chemotherapy. Restaging.  TECHNIQUE: Spiral CT images were acquired through the chest, abdomen and pelvis with oral and IV contrast and were reconstructed in 3 mm thick axial slices.  COMPARISON: CT scans of the chest abdomen and pelvis dated 10/12/2016.  FINDINGS: Again seen are multiple small well-circumscribed noncalcified pulmonary nodules of varying size throughout both lungs consistent with hematogenous pulmonary metastases. Several small new metastatic lesions are identified in both lungs. This includes a 6 mm diameter nodule in the posterior aspect of the right upper lobe and a 4 mm diameter nodule in the lateral aspect of  the left upper lobe and a 5 mm diameter nodule in the right lower lobe and also a similar sized metastasis in the medial aspect of the right middle lobe. All of the pre-existing metastases show slight interval increase in size. The largest metastatic lesion is a pleural-based metastasis in the lateral aspect of the left lower lobe that now measures up to 1.4 cm in diameter. Previously it measured 1.1 cm in greatest transverse diameter. A 1.0 cm diameter metastasis in the medial aspect of the right lower lobe measured only 0.5 cm in diameter on the previous CT scan. There are no parenchymal infiltrates or pleural effusions. There is no mediastinal or hilar or axillary lymphadenopathy.  The liver and spleen and pancreas and kidneys and adrenal glands appear within normal limits. No lymphadenopathy is identified in the abdomen . The patient is status post AP resection. A colostomy site is present in the left lower quadrant. No abnormal soft tissues identified in the previous location of the rectum. There is a 10 mm diameter enlarged lymph node anterior to the right piriformis muscle that appears unchanged.      Impression: Multiple bilateral pulmonary nodules consistent with hematogenous metastases showing slight interval increase in size and number of lesions since a preceding CT chest dated 10/12/2016. There is no evidence of metastatic disease within the abdomen. A solitary mildly enlarged lymph node in the right hemipelvis is stable.  This report was finalized on 5/11/2017 7:42 AM by Dr. Macho Dominique MD.        Assessment/Plan   ASSESSMENT/PLAN:  This is a 56 y.o. female with:  1.  Metastatic rectal cancer, Kras mutated: She had a resection on 10/31/2016 by Brie Clemente and [place].  Unfortunately, PET scan shows evidence for local lydia metastases as well as bilateral pulmonary metastases.    - Palliative FOLFIRI/Avastin started 2/10/17. Avastin started with cycle 2. Multiple dose reductions due to cytopenias.  Stopped 5/2017 due to disease progression   - Scans showed disease progression. Therapy was changed to FOLFOX with Avastin, with initial dose reduction of 25% across all agents and Granix on days 8,9,10.     - She returns today, for cycle 2 of FOLFOX Avastin.  We will proceed with the same dose as previous as well as continue with Granix ×3 days, and this will be given days 45 and 6 this cycle.  As was discussed with Dr. Paez today.    2. Vitamin B12 deficiency. B12 injections and oral B12 for time being.    3. Neutropenia before intiation of chemotherapy. Likely secondary to B12 deficiency and chemo.   - Dose adjustments with chemotherapy as above.     4. Cancer related pain. Norco 10 mg every 6 hrs prn.     5. Anxiety. Celexa 20 mg daily. Her father recently passed away which is adding to her anxiety.    6. Insomnia. Klonopin 1mg PRN      She will see Dr. Paez will see her in 2 weeks for cycle 3.  GORGE Laidr

## 2017-06-07 ENCOUNTER — INFUSION (OUTPATIENT)
Dept: ONCOLOGY | Facility: HOSPITAL | Age: 57
End: 2017-06-07

## 2017-06-07 DIAGNOSIS — Z45.2 FITTING AND ADJUSTMENT OF VASCULAR CATHETER: Primary | ICD-10-CM

## 2017-06-07 DIAGNOSIS — C20 MALIGNANT NEOPLASM OF RECTUM (HCC): ICD-10-CM

## 2017-06-07 PROCEDURE — 25010000002 HEPARIN FLUSH (PORCINE) 100 UNIT/ML SOLUTION: Performed by: INTERNAL MEDICINE

## 2017-06-07 PROCEDURE — 96523 IRRIG DRUG DELIVERY DEVICE: CPT | Performed by: NURSE PRACTITIONER

## 2017-06-07 RX ORDER — SODIUM CHLORIDE 0.9 % (FLUSH) 0.9 %
10 SYRINGE (ML) INJECTION AS NEEDED
Status: CANCELLED | OUTPATIENT
Start: 2017-06-07

## 2017-06-07 RX ORDER — SODIUM CHLORIDE 0.9 % (FLUSH) 0.9 %
10 SYRINGE (ML) INJECTION AS NEEDED
Status: DISCONTINUED | OUTPATIENT
Start: 2017-06-07 | End: 2017-06-07 | Stop reason: HOSPADM

## 2017-06-07 RX ADMIN — SODIUM CHLORIDE, PRESERVATIVE FREE 500 UNITS: 5 INJECTION INTRAVENOUS at 11:01

## 2017-06-07 RX ADMIN — Medication 10 ML: at 11:00

## 2017-06-08 ENCOUNTER — INFUSION (OUTPATIENT)
Dept: ONCOLOGY | Facility: HOSPITAL | Age: 57
End: 2017-06-08

## 2017-06-08 DIAGNOSIS — C20 MALIGNANT NEOPLASM OF RECTUM (HCC): Primary | ICD-10-CM

## 2017-06-08 PROCEDURE — 96372 THER/PROPH/DIAG INJ SC/IM: CPT | Performed by: INTERNAL MEDICINE

## 2017-06-08 PROCEDURE — 25010000002 TBO-FILGRASTIM 480 MCG/0.8ML SOLUTION PREFILLED SYRINGE: Performed by: INTERNAL MEDICINE

## 2017-06-08 RX ADMIN — TBO-FILGRASTIM 480 MCG: 480 INJECTION, SOLUTION SUBCUTANEOUS at 14:31

## 2017-06-09 ENCOUNTER — INFUSION (OUTPATIENT)
Dept: ONCOLOGY | Facility: HOSPITAL | Age: 57
End: 2017-06-09

## 2017-06-09 VITALS — TEMPERATURE: 98.5 F

## 2017-06-09 DIAGNOSIS — C20 MALIGNANT NEOPLASM OF RECTUM (HCC): Primary | ICD-10-CM

## 2017-06-09 PROCEDURE — 25010000002 TBO-FILGRASTIM 480 MCG/0.8ML SOLUTION PREFILLED SYRINGE: Performed by: INTERNAL MEDICINE

## 2017-06-09 PROCEDURE — 96372 THER/PROPH/DIAG INJ SC/IM: CPT | Performed by: NURSE PRACTITIONER

## 2017-06-09 RX ADMIN — TBO-FILGRASTIM 480 MCG: 480 INJECTION, SOLUTION SUBCUTANEOUS at 15:20

## 2017-06-10 ENCOUNTER — INFUSION (OUTPATIENT)
Dept: ONCOLOGY | Facility: HOSPITAL | Age: 57
End: 2017-06-10

## 2017-06-10 VITALS
SYSTOLIC BLOOD PRESSURE: 148 MMHG | RESPIRATION RATE: 20 BRPM | TEMPERATURE: 97.5 F | HEART RATE: 95 BPM | OXYGEN SATURATION: 95 % | DIASTOLIC BLOOD PRESSURE: 80 MMHG

## 2017-06-10 DIAGNOSIS — C20 MALIGNANT NEOPLASM OF RECTUM (HCC): Primary | ICD-10-CM

## 2017-06-10 PROCEDURE — 25010000002 TBO-FILGRASTIM 480 MCG/0.8ML SOLUTION PREFILLED SYRINGE: Performed by: INTERNAL MEDICINE

## 2017-06-10 PROCEDURE — 96372 THER/PROPH/DIAG INJ SC/IM: CPT

## 2017-06-10 RX ADMIN — TBO-FILGRASTIM 480 MCG: 480 INJECTION, SOLUTION SUBCUTANEOUS at 14:38

## 2017-06-15 DIAGNOSIS — C20 MALIGNANT NEOPLASM OF RECTUM (HCC): ICD-10-CM

## 2017-06-19 ENCOUNTER — INFUSION (OUTPATIENT)
Dept: ONCOLOGY | Facility: HOSPITAL | Age: 57
End: 2017-06-19

## 2017-06-19 ENCOUNTER — OFFICE VISIT (OUTPATIENT)
Dept: ONCOLOGY | Facility: CLINIC | Age: 57
End: 2017-06-19

## 2017-06-19 VITALS
TEMPERATURE: 98 F | WEIGHT: 163.6 LBS | SYSTOLIC BLOOD PRESSURE: 126 MMHG | DIASTOLIC BLOOD PRESSURE: 74 MMHG | RESPIRATION RATE: 18 BRPM | OXYGEN SATURATION: 97 % | HEART RATE: 93 BPM | BODY MASS INDEX: 27.93 KG/M2 | HEIGHT: 64 IN

## 2017-06-19 DIAGNOSIS — C20 MALIGNANT NEOPLASM OF RECTUM (HCC): Primary | ICD-10-CM

## 2017-06-19 DIAGNOSIS — D70.1 CHEMOTHERAPY-INDUCED NEUTROPENIA (HCC): ICD-10-CM

## 2017-06-19 DIAGNOSIS — T45.1X5A CHEMOTHERAPY-INDUCED NEUTROPENIA (HCC): ICD-10-CM

## 2017-06-19 LAB
ALBUMIN SERPL-MCNC: 4.1 G/DL (ref 3.5–5.2)
ALBUMIN/GLOB SERPL: 1.2 G/DL (ref 1.1–2.4)
ALP SERPL-CCNC: 77 U/L (ref 38–116)
ALT SERPL W P-5'-P-CCNC: 24 U/L (ref 0–33)
ANION GAP SERPL CALCULATED.3IONS-SCNC: 13.4 MMOL/L
AST SERPL-CCNC: 27 U/L (ref 0–32)
BASOPHILS # BLD AUTO: 0.03 10*3/MM3 (ref 0–0.1)
BASOPHILS NFR BLD AUTO: 0.8 % (ref 0–1.1)
BILIRUB SERPL-MCNC: 0.4 MG/DL (ref 0.1–1.2)
BILIRUB UR QL STRIP: NEGATIVE
BUN BLD-MCNC: 15 MG/DL (ref 6–20)
BUN/CREAT SERPL: 21.1 (ref 7.3–30)
CALCIUM SPEC-SCNC: 9.4 MG/DL (ref 8.5–10.2)
CHLORIDE SERPL-SCNC: 101 MMOL/L (ref 98–107)
CLARITY UR: ABNORMAL
CO2 SERPL-SCNC: 26.6 MMOL/L (ref 22–29)
COLOR UR: YELLOW
CREAT BLD-MCNC: 0.71 MG/DL (ref 0.6–1.1)
DEPRECATED RDW RBC AUTO: 41.7 FL (ref 37–49)
EOSINOPHIL # BLD AUTO: 0.1 10*3/MM3 (ref 0–0.36)
EOSINOPHIL NFR BLD AUTO: 2.7 % (ref 1–5)
ERYTHROCYTE [DISTWIDTH] IN BLOOD BY AUTOMATED COUNT: 13.9 % (ref 11.7–14.5)
GFR SERPL CREATININE-BSD FRML MDRD: 103 ML/MIN/1.73
GFR SERPL CREATININE-BSD FRML MDRD: 85 ML/MIN/1.73
GLOBULIN UR ELPH-MCNC: 3.4 GM/DL (ref 1.8–3.5)
GLUCOSE BLD-MCNC: 99 MG/DL (ref 74–124)
GLUCOSE UR STRIP-MCNC: NEGATIVE MG/DL
HCT VFR BLD AUTO: 39 % (ref 34–45)
HGB BLD-MCNC: 13.4 G/DL (ref 11.5–14.9)
HGB UR QL STRIP.AUTO: ABNORMAL
HOLD SPECIMEN: NORMAL
IMM GRANULOCYTES # BLD: 0.02 10*3/MM3 (ref 0–0.03)
IMM GRANULOCYTES NFR BLD: 0.5 % (ref 0–0.5)
KETONES UR QL STRIP: NEGATIVE
LEUKOCYTE ESTERASE UR QL STRIP.AUTO: ABNORMAL
LYMPHOCYTES # BLD AUTO: 1.65 10*3/MM3 (ref 1–3.5)
LYMPHOCYTES NFR BLD AUTO: 44 % (ref 20–49)
MCH RBC QN AUTO: 28.9 PG (ref 27–33)
MCHC RBC AUTO-ENTMCNC: 34.4 G/DL (ref 32–35)
MCV RBC AUTO: 84.2 FL (ref 83–97)
MONOCYTES # BLD AUTO: 0.41 10*3/MM3 (ref 0.25–0.8)
MONOCYTES NFR BLD AUTO: 10.9 % (ref 4–12)
NEUTROPHILS # BLD AUTO: 1.54 10*3/MM3 (ref 1.5–7)
NEUTROPHILS NFR BLD AUTO: 41.1 % (ref 39–75)
NITRITE UR QL STRIP: NEGATIVE
NRBC BLD MANUAL-RTO: 0 /100 WBC (ref 0–0)
PH UR STRIP.AUTO: 5.5 [PH] (ref 4.5–8)
PLATELET # BLD AUTO: 223 10*3/MM3 (ref 150–375)
PMV BLD AUTO: 9.2 FL (ref 8.9–12.1)
POTASSIUM BLD-SCNC: 4.5 MMOL/L (ref 3.5–4.7)
PROT SERPL-MCNC: 7.5 G/DL (ref 6.3–8)
PROT UR QL STRIP: ABNORMAL
RBC # BLD AUTO: 4.63 10*6/MM3 (ref 3.9–5)
SODIUM BLD-SCNC: 141 MMOL/L (ref 134–145)
SP GR UR STRIP: 1.02 (ref 1–1.03)
UROBILINOGEN UR QL STRIP: ABNORMAL
WBC NRBC COR # BLD: 3.75 10*3/MM3 (ref 4–10)

## 2017-06-19 PROCEDURE — 96413 CHEMO IV INFUSION 1 HR: CPT | Performed by: INTERNAL MEDICINE

## 2017-06-19 PROCEDURE — 85025 COMPLETE CBC W/AUTO DIFF WBC: CPT

## 2017-06-19 PROCEDURE — 25010000002 PALONOSETRON PER 25 MCG: Performed by: INTERNAL MEDICINE

## 2017-06-19 PROCEDURE — 80053 COMPREHEN METABOLIC PANEL: CPT

## 2017-06-19 PROCEDURE — 25010000002 BEVACIZUMAB PER 10 MG: Performed by: INTERNAL MEDICINE

## 2017-06-19 PROCEDURE — 25010000002 FLUOROURACIL PER 500 MG: Performed by: INTERNAL MEDICINE

## 2017-06-19 PROCEDURE — 99212-NC PR NO CHARGE CBC OFFICE OUTPATIENT VISIT 10 MINUTES: Performed by: INTERNAL MEDICINE

## 2017-06-19 PROCEDURE — 96415 CHEMO IV INFUSION ADDL HR: CPT | Performed by: INTERNAL MEDICINE

## 2017-06-19 PROCEDURE — 25010000002 OXALIPLATIN PER 0.5 MG: Performed by: INTERNAL MEDICINE

## 2017-06-19 PROCEDURE — 96368 THER/DIAG CONCURRENT INF: CPT | Performed by: INTERNAL MEDICINE

## 2017-06-19 PROCEDURE — 81003 URINALYSIS AUTO W/O SCOPE: CPT

## 2017-06-19 PROCEDURE — 96416 CHEMO PROLONG INFUSE W/PUMP: CPT | Performed by: INTERNAL MEDICINE

## 2017-06-19 PROCEDURE — 25010000002 DEXAMETHASONE SODIUM PHOSPHATE 10 MG/ML SOLUTION 1 ML VIAL: Performed by: INTERNAL MEDICINE

## 2017-06-19 PROCEDURE — 96417 CHEMO IV INFUS EACH ADDL SEQ: CPT | Performed by: INTERNAL MEDICINE

## 2017-06-19 PROCEDURE — 96375 TX/PRO/DX INJ NEW DRUG ADDON: CPT | Performed by: INTERNAL MEDICINE

## 2017-06-19 PROCEDURE — 25010000002 LEUCOVORIN 200 MG RECONSTITUTED SOLUTION 1 EACH VIAL: Performed by: INTERNAL MEDICINE

## 2017-06-19 RX ORDER — PALONOSETRON 0.05 MG/ML
0.25 INJECTION, SOLUTION INTRAVENOUS ONCE
Status: CANCELLED | OUTPATIENT
Start: 2017-07-17

## 2017-06-19 RX ORDER — PALONOSETRON 0.05 MG/ML
0.25 INJECTION, SOLUTION INTRAVENOUS ONCE
Status: COMPLETED | OUTPATIENT
Start: 2017-06-19 | End: 2017-06-19

## 2017-06-19 RX ORDER — DEXTROSE MONOHYDRATE 50 MG/ML
250 INJECTION, SOLUTION INTRAVENOUS ONCE
Status: CANCELLED | OUTPATIENT
Start: 2017-07-17

## 2017-06-19 RX ORDER — PALONOSETRON 0.05 MG/ML
0.25 INJECTION, SOLUTION INTRAVENOUS ONCE
Status: CANCELLED | OUTPATIENT
Start: 2017-07-03

## 2017-06-19 RX ORDER — DEXTROSE MONOHYDRATE 50 MG/ML
250 INJECTION, SOLUTION INTRAVENOUS ONCE
Status: CANCELLED | OUTPATIENT
Start: 2017-06-19

## 2017-06-19 RX ORDER — HYDROCODONE BITARTRATE AND ACETAMINOPHEN 10; 325 MG/1; MG/1
1 TABLET ORAL EVERY 6 HOURS PRN
Qty: 120 TABLET | Refills: 0 | Status: SHIPPED | OUTPATIENT
Start: 2017-06-19 | End: 2017-07-17 | Stop reason: SDUPTHER

## 2017-06-19 RX ORDER — SODIUM CHLORIDE 9 MG/ML
250 INJECTION, SOLUTION INTRAVENOUS ONCE
Status: COMPLETED | OUTPATIENT
Start: 2017-06-19 | End: 2017-06-19

## 2017-06-19 RX ORDER — SODIUM CHLORIDE 9 MG/ML
250 INJECTION, SOLUTION INTRAVENOUS ONCE
Status: CANCELLED | OUTPATIENT
Start: 2017-06-19

## 2017-06-19 RX ORDER — SODIUM CHLORIDE 9 MG/ML
250 INJECTION, SOLUTION INTRAVENOUS ONCE
Status: CANCELLED | OUTPATIENT
Start: 2017-07-17

## 2017-06-19 RX ORDER — DEXTROSE MONOHYDRATE 50 MG/ML
250 INJECTION, SOLUTION INTRAVENOUS ONCE
Status: COMPLETED | OUTPATIENT
Start: 2017-06-19 | End: 2017-06-19

## 2017-06-19 RX ORDER — DEXTROSE MONOHYDRATE 50 MG/ML
250 INJECTION, SOLUTION INTRAVENOUS ONCE
Status: CANCELLED | OUTPATIENT
Start: 2017-07-03

## 2017-06-19 RX ORDER — PALONOSETRON 0.05 MG/ML
0.25 INJECTION, SOLUTION INTRAVENOUS ONCE
Status: CANCELLED | OUTPATIENT
Start: 2017-06-19

## 2017-06-19 RX ORDER — SODIUM CHLORIDE 9 MG/ML
250 INJECTION, SOLUTION INTRAVENOUS ONCE
Status: CANCELLED | OUTPATIENT
Start: 2017-07-03

## 2017-06-19 RX ADMIN — PALONOSETRON HYDROCHLORIDE 0.25 MG: 0.25 INJECTION INTRAVENOUS at 11:06

## 2017-06-19 RX ADMIN — SODIUM CHLORIDE 250 ML: 900 INJECTION, SOLUTION INTRAVENOUS at 10:56

## 2017-06-19 RX ADMIN — LEUCOVORIN CALCIUM 540 MG: 200 INJECTION, POWDER, LYOPHILIZED, FOR SOLUTION INTRAMUSCULAR; INTRAVENOUS at 12:20

## 2017-06-19 RX ADMIN — DEXAMETHASONE SODIUM PHOSPHATE 12 MG: 10 INJECTION, SOLUTION INTRAMUSCULAR; INTRAVENOUS at 11:06

## 2017-06-19 RX ADMIN — FLUOROURACIL 3220 MG: 50 INJECTION, SOLUTION INTRAVENOUS at 14:25

## 2017-06-19 RX ADMIN — DEXTROSE MONOHYDRATE 250 ML: 50 INJECTION, SOLUTION INTRAVENOUS at 12:30

## 2017-06-19 RX ADMIN — BEVACIZUMAB 370 MG: 400 INJECTION, SOLUTION INTRAVENOUS at 11:42

## 2017-06-19 RX ADMIN — OXALIPLATIN 115 MG: 5 INJECTION, SOLUTION, CONCENTRATE INTRAVENOUS at 12:19

## 2017-06-19 NOTE — PROGRESS NOTES
[facility] GROUP OUTPATIENT FOLLOW UP CLINIC VISIT    REASON FOR FOLLOW-UP:    Malignant neoplasm of rectum    Staging form: Colon and Rectum, AJCC V7      Pathologic: Stage IIIC (T4b, N1b, cM0) - Signed by Jude Stewart MD on 12/9/2016        Staging comments: Suspected lung metastases.        Clinical: Stage TRUDY (T4b, N1b, M1a) - Signed by Jude Stewart MD on 12/19/2016  Kras mutation positive.  BRAF negative.  Microsatellite stable.      HISTORY OF PRESENT ILLNESS:  Benigno Quispe is a 56 y.o. female who returns for  cycle 3 FOLFOX.  She is doing well and tolerating chemotherapy generally well.  No peripheral neuropathy.  Minimal nausea.  She didn't go for her gray next on days 45 and 6 which she tolerated well.  She denies any fevers, chills, night sweats.    ONCOLOGIC HISTORY:     Malignant neoplasm of rectum    10/6/2016 Biopsy    Upper and lower endoscopy by Dr. Keller:  Rectal mass showing invasive moderately differentiated adenocarcinoma.        10/12/2016 Imaging    CT imaging showed bilateral pulmonary nodules with the largest in the left lower lobe measuring 11 mm, concerning for pulmonary metastases. A mass in the pelvis measured about 4 x 3 cm with a 15 mm lymph node in the right pelvis.      10/31/2016 Surgery    APR with posterior vaginectomy by Brie Clemente and Marisa Burris. Pathology showed a 5.5 cm low-grade well to moderately differentiated adenocarcinoma with 2 of 20 lymph nodes positive for metastatic disease and a positive radial margin.      10/31/2016 Imaging    PET scan:  Multiple bilateral hypermetabolic pulmonary nodules likely  represent metastases. The hypermetabolic nodes along both pelvic  sidewalls are suspected to be metastatic. The activity along the anal  canal and the activity within shotty bilateral groin nodes is  indeterminate.      2/13/2017 -  Chemotherapy    OP COLORECTAL FOLFIRI + Bevacizumab (Irinotecan / Leucovorin / Fluorouracil / Bevacizumab)         "5/15/2017 Progression        5/23/2017 -  Chemotherapy    OP COLON mFOLFOX6 + Bevacizumab (OXALIplatin / Leucovorin / Fluorouracil / Bevacizumab)           PAST MEDICAL, SURGICAL, FAMILY, AND SOCIAL HISTORIES were reviewed with the patient and in the electronic medical record, and were updated if indicated.    ALLERGIES:  Allergies   Allergen Reactions   • Adhesive Tape        MEDICATIONS:  The medication list has been reviewed with the patient by the medical assistant, and the list has been updated in the electronic medical record, which I reviewed.  Medication dosages and frequencies were confirmed to be accurate.    REVIEW OF SYSTEMS:  Review of Systems   Constitutional: Positive for fatigue. Negative for activity change, appetite change, chills, diaphoresis, fever and unexpected weight change.   Respiratory: Negative for cough, chest tightness and shortness of breath.    Cardiovascular: Negative for chest pain and leg swelling.   Gastrointestinal: Negative for abdominal pain, blood in stool, constipation, diarrhea, nausea and vomiting.   Endocrine: Negative.    Genitourinary: Negative.    Musculoskeletal: Negative for arthralgias, joint swelling and myalgias.   Allergic/Immunologic: Negative.    Neurological: Negative.    Hematological: Negative for adenopathy. Does not bruise/bleed easily.   Psychiatric/Behavioral: Negative for agitation, confusion and dysphoric mood. The patient is not nervous/anxious.      Objective     Vitals:    06/19/17 1003   BP: 126/74   Pulse: 93   Resp: 18   Temp: 98 °F (36.7 °C)   TempSrc: Oral   SpO2: 97%   Weight: 163 lb 9.6 oz (74.2 kg)   Height: 63.78\" (162 cm)   PainSc: 0-No pain     GENERAL:  Well-developed, well-nourished female in no acute distress.   SKIN:  Warm, dry without rashes, purpura or petechiae.  HEAD:  Normocephalic.  EYES:  EOMs intact.  Conjunctivae normal.  EARS:  Hearing intact.  NOSE:  Septum midline.  No excoriations or nasal discharge.  MOUTH:  Tongue is " well-papillated; no stomatitis or ulcers.  Lips normal.  CHEST:  Lungs clear to percussion and auscultation. Good airflow. mediport accessed  CARDIAC:  Regular rate and rhythm without murmurs, rubs or gallops. Normal S1,S2.  ABDOMEN:  Soft, nontender, no hepatosplenomegaly, normal bowel sounds  EXTREMITIES:  No clubbing, cyanosis or edema.  PSYCHIATRIC:  Normal affect and mood.         DIAGNOSTIC DATA:  Results for orders placed or performed in visit on 06/19/17   Comprehensive metabolic panel   Result Value Ref Range    Glucose 99 74 - 124 mg/dL    BUN 15 6 - 20 mg/dL    Creatinine 0.71 0.60 - 1.10 mg/dL    Sodium 141 134 - 145 mmol/L    Potassium 4.5 3.5 - 4.7 mmol/L    Chloride 101 98 - 107 mmol/L    CO2 26.6 22.0 - 29.0 mmol/L    Calcium 9.4 8.5 - 10.2 mg/dL    Total Protein 7.5 6.3 - 8.0 g/dL    Albumin 4.10 3.50 - 5.20 g/dL    ALT (SGPT) 24 0 - 33 U/L    AST (SGOT) 27 0 - 32 U/L    Alkaline Phosphatase 77 38 - 116 U/L    Total Bilirubin 0.4 0.1 - 1.2 mg/dL    eGFR Non African Amer 85 >60 mL/min/1.73    eGFR  African Amer 103 >60 mL/min/1.73    Globulin 3.4 1.8 - 3.5 gm/dL    A/G Ratio 1.2 1.1 - 2.4 g/dL    BUN/Creatinine Ratio 21.1 7.3 - 30.0    Anion Gap 13.4 mmol/L   CBC Auto Differential   Result Value Ref Range    WBC 3.75 (L) 4.00 - 10.00 10*3/mm3    RBC 4.63 3.90 - 5.00 10*6/mm3    Hemoglobin 13.4 11.5 - 14.9 g/dL    Hematocrit 39.0 34.0 - 45.0 %    MCV 84.2 83.0 - 97.0 fL    MCH 28.9 27.0 - 33.0 pg    MCHC 34.4 32.0 - 35.0 g/dL    RDW 13.9 11.7 - 14.5 %    RDW-SD 41.7 37.0 - 49.0 fl    MPV 9.2 8.9 - 12.1 fL    Platelets 223 150 - 375 10*3/mm3    Neutrophil % 41.1 39.0 - 75.0 %    Lymphocyte % 44.0 20.0 - 49.0 %    Monocyte % 10.9 4.0 - 12.0 %    Eosinophil % 2.7 1.0 - 5.0 %    Basophil % 0.8 0.0 - 1.1 %    Immature Grans % 0.5 0.0 - 0.5 %    Neutrophils, Absolute 1.54 1.50 - 7.00 10*3/mm3    Lymphocytes, Absolute 1.65 1.00 - 3.50 10*3/mm3    Monocytes, Absolute 0.41 0.25 - 0.80 10*3/mm3    Eosinophils,  Absolute 0.10 0.00 - 0.36 10*3/mm3    Basophils, Absolute 0.03 0.00 - 0.10 10*3/mm3    Immature Grans, Absolute 0.02 0.00 - 0.03 10*3/mm3    nRBC 0.0 0.0 - 0.0 /100 WBC     No results found.    Assessment/Plan   ASSESSMENT/PLAN:  This is a 56 y.o. female with:  1.  Metastatic rectal cancer, Kras mutated: She had a resection on 10/31/2016 by Brie Clemente and [place].  Unfortunately, PET scan shows evidence for local lydia metastases as well as bilateral pulmonary metastases.    - Palliative FOLFIRI/Avastin started 2/10/17. Multiple dose reductions due to cytopenias. Stopped 5/2017 due to disease progression   - FOLFOX with Avastin initiated 5/23/2017 with initial dose reduction of 25% across all agents and Granix on days 4-6.     - Proceed with cycle 3 FOLFOX/Avastin with Granix.    - Plan repeat imaging in 5 weeks. Patient desires to image before 6th cycle rather than before 5th cycle.     2. Vitamin B12 deficiency. B12 injections and oral B12 for time being.    3. Neutropenia before intiation of chemotherapy. Likely secondary to B12 deficiency and chemo.   - Dose adjustments with chemotherapy as above.     4. Cancer related pain. Norco 10 mg every 6 hrs prn. Refilled today.     5. Anxiety. Celexa 20 mg daily.     6. Insomnia. Klonopin 1mg PRN    Pilar Paez MD

## 2017-06-21 ENCOUNTER — INFUSION (OUTPATIENT)
Dept: ONCOLOGY | Facility: HOSPITAL | Age: 57
End: 2017-06-21

## 2017-06-21 DIAGNOSIS — Z45.2 FITTING AND ADJUSTMENT OF VASCULAR CATHETER: Primary | ICD-10-CM

## 2017-06-21 PROCEDURE — 96523 IRRIG DRUG DELIVERY DEVICE: CPT | Performed by: INTERNAL MEDICINE

## 2017-06-21 PROCEDURE — 25010000002 HEPARIN FLUSH (PORCINE) 100 UNIT/ML SOLUTION: Performed by: INTERNAL MEDICINE

## 2017-06-21 RX ORDER — SODIUM CHLORIDE 0.9 % (FLUSH) 0.9 %
10 SYRINGE (ML) INJECTION AS NEEDED
Status: DISCONTINUED | OUTPATIENT
Start: 2017-06-21 | End: 2017-06-21 | Stop reason: HOSPADM

## 2017-06-21 RX ORDER — SODIUM CHLORIDE 0.9 % (FLUSH) 0.9 %
10 SYRINGE (ML) INJECTION AS NEEDED
Status: CANCELLED | OUTPATIENT
Start: 2017-06-21

## 2017-06-21 RX ADMIN — SODIUM CHLORIDE, PRESERVATIVE FREE 500 UNITS: 5 INJECTION INTRAVENOUS at 12:42

## 2017-06-21 RX ADMIN — Medication 10 ML: at 12:40

## 2017-06-22 ENCOUNTER — INFUSION (OUTPATIENT)
Dept: ONCOLOGY | Facility: HOSPITAL | Age: 57
End: 2017-06-22

## 2017-06-22 VITALS — BODY MASS INDEX: 28.45 KG/M2 | WEIGHT: 164.6 LBS

## 2017-06-22 DIAGNOSIS — C20 MALIGNANT NEOPLASM OF RECTUM (HCC): Primary | ICD-10-CM

## 2017-06-22 PROCEDURE — 96372 THER/PROPH/DIAG INJ SC/IM: CPT | Performed by: INTERNAL MEDICINE

## 2017-06-22 PROCEDURE — 25010000002 TBO-FILGRASTIM 480 MCG/0.8ML SOLUTION PREFILLED SYRINGE: Performed by: INTERNAL MEDICINE

## 2017-06-22 RX ADMIN — TBO-FILGRASTIM 480 MCG: 480 INJECTION, SOLUTION SUBCUTANEOUS at 16:14

## 2017-06-23 ENCOUNTER — INFUSION (OUTPATIENT)
Dept: ONCOLOGY | Facility: HOSPITAL | Age: 57
End: 2017-06-23

## 2017-06-23 VITALS — WEIGHT: 164 LBS | BODY MASS INDEX: 28.35 KG/M2

## 2017-06-23 DIAGNOSIS — C20 MALIGNANT NEOPLASM OF RECTUM (HCC): Primary | ICD-10-CM

## 2017-06-23 PROCEDURE — 96372 THER/PROPH/DIAG INJ SC/IM: CPT | Performed by: INTERNAL MEDICINE

## 2017-06-23 PROCEDURE — 25010000002 TBO-FILGRASTIM 480 MCG/0.8ML SOLUTION PREFILLED SYRINGE: Performed by: INTERNAL MEDICINE

## 2017-06-23 RX ADMIN — TBO-FILGRASTIM 480 MCG: 480 INJECTION, SOLUTION SUBCUTANEOUS at 15:36

## 2017-06-24 ENCOUNTER — INFUSION (OUTPATIENT)
Dept: ONCOLOGY | Facility: HOSPITAL | Age: 57
End: 2017-06-24

## 2017-06-24 VITALS — SYSTOLIC BLOOD PRESSURE: 145 MMHG | HEART RATE: 65 BPM | DIASTOLIC BLOOD PRESSURE: 82 MMHG | TEMPERATURE: 97.4 F

## 2017-06-24 DIAGNOSIS — C20 MALIGNANT NEOPLASM OF RECTUM (HCC): Primary | ICD-10-CM

## 2017-06-24 PROCEDURE — 96372 THER/PROPH/DIAG INJ SC/IM: CPT

## 2017-06-24 PROCEDURE — 25010000002 TBO-FILGRASTIM 480 MCG/0.8ML SOLUTION PREFILLED SYRINGE: Performed by: INTERNAL MEDICINE

## 2017-06-24 RX ADMIN — TBO-FILGRASTIM 480 MCG: 480 INJECTION, SOLUTION SUBCUTANEOUS at 15:34

## 2017-07-03 ENCOUNTER — INFUSION (OUTPATIENT)
Dept: ONCOLOGY | Facility: HOSPITAL | Age: 57
End: 2017-07-03

## 2017-07-03 ENCOUNTER — TELEPHONE (OUTPATIENT)
Dept: ONCOLOGY | Facility: CLINIC | Age: 57
End: 2017-07-03

## 2017-07-03 VITALS
SYSTOLIC BLOOD PRESSURE: 136 MMHG | DIASTOLIC BLOOD PRESSURE: 62 MMHG | HEART RATE: 44 BPM | TEMPERATURE: 98.6 F | WEIGHT: 164.4 LBS | BODY MASS INDEX: 28.41 KG/M2

## 2017-07-03 DIAGNOSIS — C20 MALIGNANT NEOPLASM OF RECTUM (HCC): ICD-10-CM

## 2017-07-03 DIAGNOSIS — C20 MALIGNANT NEOPLASM OF RECTUM (HCC): Primary | ICD-10-CM

## 2017-07-03 LAB
ALBUMIN SERPL-MCNC: 3.9 G/DL (ref 3.5–5.2)
ALBUMIN/GLOB SERPL: 1.3 G/DL (ref 1.1–2.4)
ALP SERPL-CCNC: 58 U/L (ref 38–116)
ALT SERPL W P-5'-P-CCNC: 21 U/L (ref 0–33)
ANION GAP SERPL CALCULATED.3IONS-SCNC: 12.2 MMOL/L
AST SERPL-CCNC: 26 U/L (ref 0–32)
BASOPHILS # BLD AUTO: 0.03 10*3/MM3 (ref 0–0.1)
BASOPHILS NFR BLD AUTO: 0.7 % (ref 0–1.1)
BILIRUB SERPL-MCNC: 0.4 MG/DL (ref 0.1–1.2)
BILIRUB UR QL STRIP: NEGATIVE
BUN BLD-MCNC: 23 MG/DL (ref 6–20)
BUN/CREAT SERPL: 23.7 (ref 7.3–30)
CALCIUM SPEC-SCNC: 9.1 MG/DL (ref 8.5–10.2)
CHLORIDE SERPL-SCNC: 102 MMOL/L (ref 98–107)
CLARITY UR: CLEAR
CO2 SERPL-SCNC: 25.8 MMOL/L (ref 22–29)
COLOR UR: YELLOW
CREAT BLD-MCNC: 0.97 MG/DL (ref 0.6–1.1)
DEPRECATED RDW RBC AUTO: 44.4 FL (ref 37–49)
EOSINOPHIL # BLD AUTO: 0.09 10*3/MM3 (ref 0–0.36)
EOSINOPHIL NFR BLD AUTO: 2.2 % (ref 1–5)
ERYTHROCYTE [DISTWIDTH] IN BLOOD BY AUTOMATED COUNT: 14.7 % (ref 11.7–14.5)
GFR SERPL CREATININE-BSD FRML MDRD: 59 ML/MIN/1.73
GFR SERPL CREATININE-BSD FRML MDRD: 72 ML/MIN/1.73
GLOBULIN UR ELPH-MCNC: 3.1 GM/DL (ref 1.8–3.5)
GLUCOSE BLD-MCNC: 101 MG/DL (ref 74–124)
GLUCOSE UR STRIP-MCNC: NEGATIVE MG/DL
HCT VFR BLD AUTO: 38.7 % (ref 34–45)
HGB BLD-MCNC: 13 G/DL (ref 11.5–14.9)
HGB UR QL STRIP.AUTO: ABNORMAL
IMM GRANULOCYTES # BLD: 0.03 10*3/MM3 (ref 0–0.03)
IMM GRANULOCYTES NFR BLD: 0.7 % (ref 0–0.5)
KETONES UR QL STRIP: NEGATIVE
LEUKOCYTE ESTERASE UR QL STRIP.AUTO: ABNORMAL
LYMPHOCYTES # BLD AUTO: 1.59 10*3/MM3 (ref 1–3.5)
LYMPHOCYTES NFR BLD AUTO: 39.1 % (ref 20–49)
MCH RBC QN AUTO: 28.6 PG (ref 27–33)
MCHC RBC AUTO-ENTMCNC: 33.6 G/DL (ref 32–35)
MCV RBC AUTO: 85.1 FL (ref 83–97)
MONOCYTES # BLD AUTO: 0.43 10*3/MM3 (ref 0.25–0.8)
MONOCYTES NFR BLD AUTO: 10.6 % (ref 4–12)
NEUTROPHILS # BLD AUTO: 1.9 10*3/MM3 (ref 1.5–7)
NEUTROPHILS NFR BLD AUTO: 46.7 % (ref 39–75)
NITRITE UR QL STRIP: NEGATIVE
NRBC BLD MANUAL-RTO: 0 /100 WBC (ref 0–0)
PH UR STRIP.AUTO: 5.5 [PH] (ref 4.5–8)
PLATELET # BLD AUTO: 139 10*3/MM3 (ref 150–375)
PMV BLD AUTO: 9 FL (ref 8.9–12.1)
POTASSIUM BLD-SCNC: 4.3 MMOL/L (ref 3.5–4.7)
PROT SERPL-MCNC: 7 G/DL (ref 6.3–8)
PROT UR QL STRIP: NEGATIVE
RBC # BLD AUTO: 4.55 10*6/MM3 (ref 3.9–5)
SODIUM BLD-SCNC: 140 MMOL/L (ref 134–145)
SP GR UR STRIP: 1.02 (ref 1–1.03)
UROBILINOGEN UR QL STRIP: ABNORMAL
WBC NRBC COR # BLD: 4.07 10*3/MM3 (ref 4–10)

## 2017-07-03 PROCEDURE — 25010000002 PALONOSETRON PER 25 MCG: Performed by: INTERNAL MEDICINE

## 2017-07-03 PROCEDURE — 25010000002 OXALIPLATIN PER 0.5 MG: Performed by: INTERNAL MEDICINE

## 2017-07-03 PROCEDURE — 96413 CHEMO IV INFUSION 1 HR: CPT | Performed by: INTERNAL MEDICINE

## 2017-07-03 PROCEDURE — 96417 CHEMO IV INFUS EACH ADDL SEQ: CPT | Performed by: INTERNAL MEDICINE

## 2017-07-03 PROCEDURE — 25010000002 DEXAMETHASONE PER 1 MG: Performed by: INTERNAL MEDICINE

## 2017-07-03 PROCEDURE — 80053 COMPREHEN METABOLIC PANEL: CPT

## 2017-07-03 PROCEDURE — 85025 COMPLETE CBC W/AUTO DIFF WBC: CPT

## 2017-07-03 PROCEDURE — 96368 THER/DIAG CONCURRENT INF: CPT | Performed by: INTERNAL MEDICINE

## 2017-07-03 PROCEDURE — 96415 CHEMO IV INFUSION ADDL HR: CPT | Performed by: INTERNAL MEDICINE

## 2017-07-03 PROCEDURE — 96416 CHEMO PROLONG INFUSE W/PUMP: CPT | Performed by: INTERNAL MEDICINE

## 2017-07-03 PROCEDURE — 81003 URINALYSIS AUTO W/O SCOPE: CPT

## 2017-07-03 PROCEDURE — 25010000002 FLUOROURACIL PER 500 MG: Performed by: INTERNAL MEDICINE

## 2017-07-03 PROCEDURE — 25010000002 LEUCOVORIN CALCIUM PER 50 MG: Performed by: INTERNAL MEDICINE

## 2017-07-03 PROCEDURE — 25010000002 BEVACIZUMAB PER 10 MG: Performed by: INTERNAL MEDICINE

## 2017-07-03 PROCEDURE — 96375 TX/PRO/DX INJ NEW DRUG ADDON: CPT | Performed by: INTERNAL MEDICINE

## 2017-07-03 RX ORDER — DEXTROSE MONOHYDRATE 50 MG/ML
250 INJECTION, SOLUTION INTRAVENOUS ONCE
Status: COMPLETED | OUTPATIENT
Start: 2017-07-03 | End: 2017-07-03

## 2017-07-03 RX ORDER — PALONOSETRON 0.05 MG/ML
0.25 INJECTION, SOLUTION INTRAVENOUS ONCE
Status: COMPLETED | OUTPATIENT
Start: 2017-07-03 | End: 2017-07-03

## 2017-07-03 RX ORDER — SODIUM CHLORIDE 9 MG/ML
250 INJECTION, SOLUTION INTRAVENOUS ONCE
Status: COMPLETED | OUTPATIENT
Start: 2017-07-03 | End: 2017-07-03

## 2017-07-03 RX ADMIN — DEXTROSE MONOHYDRATE 250 ML: 50 INJECTION, SOLUTION INTRAVENOUS at 10:04

## 2017-07-03 RX ADMIN — LEUCOVORIN CALCIUM 540 MG: 350 INJECTION, POWDER, LYOPHILIZED, FOR SOLUTION INTRAMUSCULAR; INTRAVENOUS at 10:04

## 2017-07-03 RX ADMIN — OXALIPLATIN 115 MG: 5 INJECTION, SOLUTION, CONCENTRATE INTRAVENOUS at 10:04

## 2017-07-03 RX ADMIN — PALONOSETRON HYDROCHLORIDE 0.25 MG: 0.25 INJECTION INTRAVENOUS at 09:01

## 2017-07-03 RX ADMIN — DEXAMETHASONE SODIUM PHOSPHATE 12 MG: 10 INJECTION INTRAMUSCULAR; INTRAVENOUS at 09:02

## 2017-07-03 RX ADMIN — SODIUM CHLORIDE 250 ML: 900 INJECTION, SOLUTION INTRAVENOUS at 09:02

## 2017-07-03 RX ADMIN — BEVACIZUMAB 370 MG: 400 INJECTION, SOLUTION INTRAVENOUS at 09:27

## 2017-07-03 RX ADMIN — FLUOROURACIL 3220 MG: 50 INJECTION, SOLUTION INTRAVENOUS at 12:13

## 2017-07-03 NOTE — TELEPHONE ENCOUNTER
Patient calling to see if she needs a note for airport security because she has a colostomy. Informed patient that she could call and check with them but I do not think she would need a letter. Pt v/u

## 2017-07-05 ENCOUNTER — INFUSION (OUTPATIENT)
Dept: ONCOLOGY | Facility: HOSPITAL | Age: 57
End: 2017-07-05

## 2017-07-05 DIAGNOSIS — Z45.2 FITTING AND ADJUSTMENT OF VASCULAR CATHETER: ICD-10-CM

## 2017-07-05 DIAGNOSIS — C20 MALIGNANT NEOPLASM OF RECTUM (HCC): Primary | ICD-10-CM

## 2017-07-05 PROCEDURE — 96523 IRRIG DRUG DELIVERY DEVICE: CPT | Performed by: INTERNAL MEDICINE

## 2017-07-05 PROCEDURE — 25010000002 HEPARIN FLUSH (PORCINE) 100 UNIT/ML SOLUTION: Performed by: INTERNAL MEDICINE

## 2017-07-05 RX ORDER — SODIUM CHLORIDE 0.9 % (FLUSH) 0.9 %
10 SYRINGE (ML) INJECTION AS NEEDED
Status: DISCONTINUED | OUTPATIENT
Start: 2017-07-05 | End: 2017-07-05 | Stop reason: HOSPADM

## 2017-07-05 RX ORDER — SODIUM CHLORIDE 0.9 % (FLUSH) 0.9 %
10 SYRINGE (ML) INJECTION AS NEEDED
Status: CANCELLED | OUTPATIENT
Start: 2017-07-05

## 2017-07-05 RX ADMIN — Medication 10 ML: at 10:05

## 2017-07-05 RX ADMIN — SODIUM CHLORIDE, PRESERVATIVE FREE 500 UNITS: 5 INJECTION INTRAVENOUS at 10:05

## 2017-07-06 ENCOUNTER — INFUSION (OUTPATIENT)
Dept: ONCOLOGY | Facility: HOSPITAL | Age: 57
End: 2017-07-06

## 2017-07-06 VITALS — BODY MASS INDEX: 28.62 KG/M2 | WEIGHT: 165.6 LBS

## 2017-07-06 DIAGNOSIS — C20 MALIGNANT NEOPLASM OF RECTUM (HCC): ICD-10-CM

## 2017-07-06 DIAGNOSIS — D51.9 ANEMIA DUE TO VITAMIN B12 DEFICIENCY, UNSPECIFIED B12 DEFICIENCY TYPE: ICD-10-CM

## 2017-07-06 DIAGNOSIS — Z45.2 FITTING AND ADJUSTMENT OF VASCULAR CATHETER: Primary | ICD-10-CM

## 2017-07-06 PROCEDURE — 25010000002 CYANOCOBALAMIN PER 1000 MCG: Performed by: INTERNAL MEDICINE

## 2017-07-06 PROCEDURE — 96372 THER/PROPH/DIAG INJ SC/IM: CPT | Performed by: INTERNAL MEDICINE

## 2017-07-06 PROCEDURE — 25010000002 TBO-FILGRASTIM 480 MCG/0.8ML SOLUTION PREFILLED SYRINGE: Performed by: INTERNAL MEDICINE

## 2017-07-06 RX ORDER — CYANOCOBALAMIN 1000 UG/ML
1000 INJECTION, SOLUTION INTRAMUSCULAR; SUBCUTANEOUS ONCE
Status: COMPLETED | OUTPATIENT
Start: 2017-07-06 | End: 2017-07-06

## 2017-07-06 RX ORDER — CYANOCOBALAMIN 1000 UG/ML
1000 INJECTION, SOLUTION INTRAMUSCULAR; SUBCUTANEOUS ONCE
Status: CANCELLED | OUTPATIENT
Start: 2017-07-06

## 2017-07-06 RX ADMIN — CYANOCOBALAMIN 1000 MCG: 1000 INJECTION, SOLUTION INTRAMUSCULAR; SUBCUTANEOUS at 15:34

## 2017-07-06 RX ADMIN — TBO-FILGRASTIM 480 MCG: 480 INJECTION, SOLUTION SUBCUTANEOUS at 15:35

## 2017-07-07 ENCOUNTER — INFUSION (OUTPATIENT)
Dept: ONCOLOGY | Facility: HOSPITAL | Age: 57
End: 2017-07-07

## 2017-07-07 VITALS — TEMPERATURE: 98 F

## 2017-07-07 DIAGNOSIS — C20 MALIGNANT NEOPLASM OF RECTUM (HCC): Primary | ICD-10-CM

## 2017-07-07 PROCEDURE — 96372 THER/PROPH/DIAG INJ SC/IM: CPT | Performed by: NURSE PRACTITIONER

## 2017-07-07 PROCEDURE — 25010000002 TBO-FILGRASTIM 480 MCG/0.8ML SOLUTION PREFILLED SYRINGE: Performed by: INTERNAL MEDICINE

## 2017-07-07 RX ADMIN — TBO-FILGRASTIM 480 MCG: 480 INJECTION, SOLUTION SUBCUTANEOUS at 16:11

## 2017-07-08 ENCOUNTER — INFUSION (OUTPATIENT)
Dept: ONCOLOGY | Facility: HOSPITAL | Age: 57
End: 2017-07-08

## 2017-07-08 VITALS
RESPIRATION RATE: 18 BRPM | SYSTOLIC BLOOD PRESSURE: 140 MMHG | HEART RATE: 87 BPM | TEMPERATURE: 97.9 F | DIASTOLIC BLOOD PRESSURE: 83 MMHG | OXYGEN SATURATION: 96 %

## 2017-07-08 DIAGNOSIS — C20 MALIGNANT NEOPLASM OF RECTUM (HCC): Primary | ICD-10-CM

## 2017-07-08 PROCEDURE — 25010000002 TBO-FILGRASTIM 480 MCG/0.8ML SOLUTION PREFILLED SYRINGE: Performed by: INTERNAL MEDICINE

## 2017-07-08 PROCEDURE — 96372 THER/PROPH/DIAG INJ SC/IM: CPT

## 2017-07-08 RX ADMIN — TBO-FILGRASTIM 480 MCG: 480 INJECTION, SOLUTION SUBCUTANEOUS at 15:43

## 2017-07-17 ENCOUNTER — INFUSION (OUTPATIENT)
Dept: ONCOLOGY | Facility: HOSPITAL | Age: 57
End: 2017-07-17

## 2017-07-17 ENCOUNTER — OFFICE VISIT (OUTPATIENT)
Dept: ONCOLOGY | Facility: CLINIC | Age: 57
End: 2017-07-17

## 2017-07-17 VITALS
TEMPERATURE: 98 F | HEART RATE: 96 BPM | OXYGEN SATURATION: 99 % | SYSTOLIC BLOOD PRESSURE: 140 MMHG | HEIGHT: 64 IN | RESPIRATION RATE: 14 BRPM | WEIGHT: 163 LBS | BODY MASS INDEX: 27.83 KG/M2 | DIASTOLIC BLOOD PRESSURE: 86 MMHG

## 2017-07-17 DIAGNOSIS — C20 MALIGNANT NEOPLASM OF RECTUM (HCC): Primary | ICD-10-CM

## 2017-07-17 DIAGNOSIS — C20 MALIGNANT NEOPLASM OF RECTUM (HCC): ICD-10-CM

## 2017-07-17 DIAGNOSIS — D70.1 CHEMOTHERAPY-INDUCED NEUTROPENIA (HCC): ICD-10-CM

## 2017-07-17 DIAGNOSIS — T45.1X5A CHEMOTHERAPY-INDUCED NEUTROPENIA (HCC): ICD-10-CM

## 2017-07-17 LAB
ALBUMIN SERPL-MCNC: 4.1 G/DL (ref 3.5–5.2)
ALBUMIN/GLOB SERPL: 1.4 G/DL (ref 1.1–2.4)
ALP SERPL-CCNC: 54 U/L (ref 38–116)
ALT SERPL W P-5'-P-CCNC: 18 U/L (ref 0–33)
ANION GAP SERPL CALCULATED.3IONS-SCNC: 13.1 MMOL/L
AST SERPL-CCNC: 24 U/L (ref 0–32)
BASOPHILS # BLD AUTO: 0.03 10*3/MM3 (ref 0–0.1)
BASOPHILS NFR BLD AUTO: 1 % (ref 0–1.1)
BILIRUB SERPL-MCNC: 0.5 MG/DL (ref 0.1–1.2)
BILIRUB UR QL STRIP: NEGATIVE
BUN BLD-MCNC: 14 MG/DL (ref 6–20)
BUN/CREAT SERPL: 21.2 (ref 7.3–30)
CALCIUM SPEC-SCNC: 8.9 MG/DL (ref 8.5–10.2)
CHLORIDE SERPL-SCNC: 102 MMOL/L (ref 98–107)
CLARITY UR: CLEAR
CO2 SERPL-SCNC: 26.9 MMOL/L (ref 22–29)
COLOR UR: YELLOW
CREAT BLD-MCNC: 0.66 MG/DL (ref 0.6–1.1)
DEPRECATED RDW RBC AUTO: 47.9 FL (ref 37–49)
EOSINOPHIL # BLD AUTO: 0.15 10*3/MM3 (ref 0–0.36)
EOSINOPHIL NFR BLD AUTO: 4.9 % (ref 1–5)
ERYTHROCYTE [DISTWIDTH] IN BLOOD BY AUTOMATED COUNT: 15.6 % (ref 11.7–14.5)
GFR SERPL CREATININE-BSD FRML MDRD: 112 ML/MIN/1.73
GFR SERPL CREATININE-BSD FRML MDRD: 93 ML/MIN/1.73
GLOBULIN UR ELPH-MCNC: 2.9 GM/DL (ref 1.8–3.5)
GLUCOSE BLD-MCNC: 111 MG/DL (ref 74–124)
GLUCOSE UR STRIP-MCNC: NEGATIVE MG/DL
HCT VFR BLD AUTO: 38.6 % (ref 34–45)
HGB BLD-MCNC: 13 G/DL (ref 11.5–14.9)
HGB UR QL STRIP.AUTO: ABNORMAL
IMM GRANULOCYTES # BLD: 0.01 10*3/MM3 (ref 0–0.03)
IMM GRANULOCYTES NFR BLD: 0.3 % (ref 0–0.5)
KETONES UR QL STRIP: NEGATIVE
LEUKOCYTE ESTERASE UR QL STRIP.AUTO: ABNORMAL
LYMPHOCYTES # BLD AUTO: 1.46 10*3/MM3 (ref 1–3.5)
LYMPHOCYTES NFR BLD AUTO: 47.9 % (ref 20–49)
MCH RBC QN AUTO: 28.8 PG (ref 27–33)
MCHC RBC AUTO-ENTMCNC: 33.7 G/DL (ref 32–35)
MCV RBC AUTO: 85.4 FL (ref 83–97)
MONOCYTES # BLD AUTO: 0.34 10*3/MM3 (ref 0.25–0.8)
MONOCYTES NFR BLD AUTO: 11.1 % (ref 4–12)
NEUTROPHILS # BLD AUTO: 1.06 10*3/MM3 (ref 1.5–7)
NEUTROPHILS NFR BLD AUTO: 34.8 % (ref 39–75)
NITRITE UR QL STRIP: NEGATIVE
NRBC BLD MANUAL-RTO: 0 /100 WBC (ref 0–0)
PH UR STRIP.AUTO: 5.5 [PH] (ref 4.5–8)
PLATELET # BLD AUTO: 120 10*3/MM3 (ref 150–375)
PMV BLD AUTO: 9.1 FL (ref 8.9–12.1)
POTASSIUM BLD-SCNC: 4 MMOL/L (ref 3.5–4.7)
PROT SERPL-MCNC: 7 G/DL (ref 6.3–8)
PROT UR QL STRIP: NEGATIVE
RBC # BLD AUTO: 4.52 10*6/MM3 (ref 3.9–5)
SODIUM BLD-SCNC: 142 MMOL/L (ref 134–145)
SP GR UR STRIP: 1.02 (ref 1–1.03)
UROBILINOGEN UR QL STRIP: ABNORMAL
WBC NRBC COR # BLD: 3.05 10*3/MM3 (ref 4–10)

## 2017-07-17 PROCEDURE — 96375 TX/PRO/DX INJ NEW DRUG ADDON: CPT | Performed by: NURSE PRACTITIONER

## 2017-07-17 PROCEDURE — 25010000002 FLUOROURACIL PER 500 MG: Performed by: INTERNAL MEDICINE

## 2017-07-17 PROCEDURE — 25010000002 OXALIPLATIN PER 0.5 MG: Performed by: INTERNAL MEDICINE

## 2017-07-17 PROCEDURE — 96417 CHEMO IV INFUS EACH ADDL SEQ: CPT | Performed by: NURSE PRACTITIONER

## 2017-07-17 PROCEDURE — 96413 CHEMO IV INFUSION 1 HR: CPT | Performed by: NURSE PRACTITIONER

## 2017-07-17 PROCEDURE — 96415 CHEMO IV INFUSION ADDL HR: CPT | Performed by: NURSE PRACTITIONER

## 2017-07-17 PROCEDURE — 99214 OFFICE O/P EST MOD 30 MIN: CPT | Performed by: NURSE PRACTITIONER

## 2017-07-17 PROCEDURE — 25010000002 PALONOSETRON PER 25 MCG: Performed by: INTERNAL MEDICINE

## 2017-07-17 PROCEDURE — 25010000002 DEXAMETHASONE SODIUM PHOSPHATE 10 MG/ML SOLUTION 1 ML VIAL: Performed by: INTERNAL MEDICINE

## 2017-07-17 PROCEDURE — 25010000002 BEVACIZUMAB PER 10 MG: Performed by: INTERNAL MEDICINE

## 2017-07-17 PROCEDURE — 80053 COMPREHEN METABOLIC PANEL: CPT

## 2017-07-17 PROCEDURE — 81003 URINALYSIS AUTO W/O SCOPE: CPT

## 2017-07-17 PROCEDURE — 96416 CHEMO PROLONG INFUSE W/PUMP: CPT | Performed by: NURSE PRACTITIONER

## 2017-07-17 PROCEDURE — 85025 COMPLETE CBC W/AUTO DIFF WBC: CPT

## 2017-07-17 PROCEDURE — 96368 THER/DIAG CONCURRENT INF: CPT | Performed by: NURSE PRACTITIONER

## 2017-07-17 PROCEDURE — 25010000002 LEUCOVORIN CALCIUM PER 50 MG: Performed by: INTERNAL MEDICINE

## 2017-07-17 RX ORDER — CLONAZEPAM 1 MG/1
1 TABLET ORAL 2 TIMES DAILY PRN
Qty: 60 TABLET | Refills: 0 | Status: SHIPPED | OUTPATIENT
Start: 2017-07-17 | End: 2017-08-28 | Stop reason: SDUPTHER

## 2017-07-17 RX ORDER — DEXTROSE MONOHYDRATE 50 MG/ML
250 INJECTION, SOLUTION INTRAVENOUS ONCE
Status: COMPLETED | OUTPATIENT
Start: 2017-07-17 | End: 2017-07-17

## 2017-07-17 RX ORDER — SODIUM CHLORIDE 9 MG/ML
250 INJECTION, SOLUTION INTRAVENOUS ONCE
Status: COMPLETED | OUTPATIENT
Start: 2017-07-17 | End: 2017-07-17

## 2017-07-17 RX ORDER — HYDROCODONE BITARTRATE AND ACETAMINOPHEN 10; 325 MG/1; MG/1
1 TABLET ORAL EVERY 6 HOURS PRN
Qty: 120 TABLET | Refills: 0 | Status: SHIPPED | OUTPATIENT
Start: 2017-07-17 | End: 2017-08-28 | Stop reason: SDUPTHER

## 2017-07-17 RX ORDER — PALONOSETRON 0.05 MG/ML
0.25 INJECTION, SOLUTION INTRAVENOUS ONCE
Status: COMPLETED | OUTPATIENT
Start: 2017-07-17 | End: 2017-07-17

## 2017-07-17 RX ADMIN — FLUOROURACIL 3220 MG: 50 INJECTION, SOLUTION INTRAVENOUS at 13:19

## 2017-07-17 RX ADMIN — OXALIPLATIN 115 MG: 5 INJECTION, SOLUTION, CONCENTRATE INTRAVENOUS at 11:11

## 2017-07-17 RX ADMIN — SODIUM CHLORIDE 250 ML: 900 INJECTION, SOLUTION INTRAVENOUS at 10:11

## 2017-07-17 RX ADMIN — DEXTROSE MONOHYDRATE 250 ML: 5 INJECTION, SOLUTION INTRAVENOUS at 11:15

## 2017-07-17 RX ADMIN — PALONOSETRON HYDROCHLORIDE 0.25 MG: 0.25 INJECTION INTRAVENOUS at 10:11

## 2017-07-17 RX ADMIN — LEUCOVORIN CALCIUM 540 MG: 350 INJECTION, POWDER, LYOPHILIZED, FOR SOLUTION INTRAMUSCULAR; INTRAVENOUS at 11:11

## 2017-07-17 RX ADMIN — BEVACIZUMAB 370 MG: 400 INJECTION, SOLUTION INTRAVENOUS at 10:32

## 2017-07-17 RX ADMIN — DEXAMETHASONE SODIUM PHOSPHATE 12 MG: 10 INJECTION, SOLUTION INTRAMUSCULAR; INTRAVENOUS at 10:13

## 2017-07-17 NOTE — PROGRESS NOTES
[facility] GROUP OUTPATIENT FOLLOW UP CLINIC VISIT    REASON FOR FOLLOW-UP:    Malignant neoplasm of rectum    Staging form: Colon and Rectum, AJCC V7      Pathologic: Stage IIIC (T4b, N1b, cM0) - Signed by Jude Stewart MD on 12/9/2016        Staging comments: Suspected lung metastases.        Clinical: Stage TRUDY (T4b, N1b, M1a) - Signed by Jude Stewart MD on 12/19/2016  Kras mutation positive.  BRAF negative.  Microsatellite stable.      HISTORY OF PRESENT ILLNESS:  Benigno Quispe is a 56 y.o. female who returns for  cycle 5 FOLFOX.  She is doing well and tolerating chemotherapy generally well.  No peripheral neuropathy.  Minimal nausea.  She is leaving for [place] on 8/1/2017 and therefore wants to reschedule her appointment on 7/31/2017 to be done when she returns.  She denies fevers.    ONCOLOGIC HISTORY:     Malignant neoplasm of rectum    10/6/2016 Biopsy    Upper and lower endoscopy by Dr. Keller:  Rectal mass showing invasive moderately differentiated adenocarcinoma.        10/12/2016 Imaging    CT imaging showed bilateral pulmonary nodules with the largest in the left lower lobe measuring 11 mm, concerning for pulmonary metastases. A mass in the pelvis measured about 4 x 3 cm with a 15 mm lymph node in the right pelvis.      10/31/2016 Surgery    APR with posterior vaginectomy by Brie Clemente and Marisa Burris. Pathology showed a 5.5 cm low-grade well to moderately differentiated adenocarcinoma with 2 of 20 lymph nodes positive for metastatic disease and a positive radial margin.      10/31/2016 Imaging    PET scan:  Multiple bilateral hypermetabolic pulmonary nodules likely  represent metastases. The hypermetabolic nodes along both pelvic  sidewalls are suspected to be metastatic. The activity along the anal  canal and the activity within shotty bilateral groin nodes is  indeterminate.      2/13/2017 -  Chemotherapy    OP COLORECTAL FOLFIRI + Bevacizumab (Irinotecan / Leucovorin / Fluorouracil  "/ Bevacizumab)        5/15/2017 Progression        5/23/2017 -  Chemotherapy    OP COLON mFOLFOX6 + Bevacizumab (OXALIplatin / Leucovorin / Fluorouracil / Bevacizumab)           PAST MEDICAL, SURGICAL, FAMILY, AND SOCIAL HISTORIES were reviewed with the patient and in the electronic medical record, and were updated if indicated.    ALLERGIES:  Allergies   Allergen Reactions   • Adhesive Tape        MEDICATIONS:  The medication list has been reviewed with the patient by the medical assistant, and the list has been updated in the electronic medical record, which I reviewed.  Medication dosages and frequencies were confirmed to be accurate.    REVIEW OF SYSTEMS:  Review of Systems   Constitutional: Positive for fatigue. Negative for activity change, appetite change, chills, diaphoresis, fever and unexpected weight change.   Respiratory: Negative for cough, chest tightness and shortness of breath.    Cardiovascular: Negative for chest pain and leg swelling.   Gastrointestinal: Negative for abdominal pain, blood in stool, constipation, diarrhea, nausea and vomiting.   Endocrine: Negative.    Genitourinary: Negative.    Musculoskeletal: Negative for arthralgias, joint swelling and myalgias.   Allergic/Immunologic: Negative.    Neurological: Negative.    Hematological: Negative for adenopathy. Does not bruise/bleed easily.   Psychiatric/Behavioral: Negative for agitation, confusion and dysphoric mood. The patient is not nervous/anxious.      Objective     Vitals:    07/17/17 0837   BP: 140/86   Pulse: 96   Resp: 14   Temp: 98 °F (36.7 °C)   TempSrc: Oral   SpO2: 99%   Weight: 163 lb (73.9 kg)   Height: 63.78\" (162 cm)   PainSc: 0-No pain     GENERAL:  Well-developed, well-nourished female in no acute distress.   SKIN:  Warm, dry without rashes, purpura or petechiae.  HEAD:  Normocephalic.  EYES:  EOMs intact.  Conjunctivae normal.  EARS:  Hearing intact.  NOSE:  Septum midline.  No excoriations or nasal discharge.  MOUTH:  " Tongue is well-papillated; no stomatitis or ulcers.  Lips normal.  CHEST:  Lungs clear to auscultation. Good airflow. mediport accessed  CARDIAC:  Regular rate and rhythm without murmurs, rubs or gallops. Normal S1,S2.  ABDOMEN:  Soft, nontender, no hepatosplenomegaly, normal bowel sounds  EXTREMITIES:  No clubbing, cyanosis or edema.  PSYCHIATRIC:  Normal affect and mood.         DIAGNOSTIC DATA:  Results for orders placed or performed in visit on 07/17/17   Comprehensive metabolic panel   Result Value Ref Range    Glucose 111 74 - 124 mg/dL    BUN 14 6 - 20 mg/dL    Creatinine 0.66 0.60 - 1.10 mg/dL    Sodium 142 134 - 145 mmol/L    Potassium 4.0 3.5 - 4.7 mmol/L    Chloride 102 98 - 107 mmol/L    CO2 26.9 22.0 - 29.0 mmol/L    Calcium 8.9 8.5 - 10.2 mg/dL    Total Protein 7.0 6.3 - 8.0 g/dL    Albumin 4.10 3.50 - 5.20 g/dL    ALT (SGPT) 18 0 - 33 U/L    AST (SGOT) 24 0 - 32 U/L    Alkaline Phosphatase 54 38 - 116 U/L    Total Bilirubin 0.5 0.1 - 1.2 mg/dL    eGFR Non African Amer 93 >60 mL/min/1.73    eGFR  African Amer 112 >60 mL/min/1.73    Globulin 2.9 1.8 - 3.5 gm/dL    A/G Ratio 1.4 1.1 - 2.4 g/dL    BUN/Creatinine Ratio 21.2 7.3 - 30.0    Anion Gap 13.1 mmol/L   Urinalysis   Result Value Ref Range    Color, UA Yellow Yellow, Straw    Appearance, UA Clear Clear    pH, UA 5.5 4.5 - 8.0    Specific Gravity, UA 1.020 1.002 - 1.030    Glucose, UA Negative Negative    Ketones, UA Negative Negative    Bilirubin, UA Negative Negative    Blood, UA Moderate (2+) (A) Negative    Protein, UA Negative Negative    Leuk Esterase, UA Trace (A) Negative    Nitrite, UA Negative Negative    Urobilinogen, UA 0.2 E.U./dL 0.2 - 1.0 E.U./dL   CBC Auto Differential   Result Value Ref Range    WBC 3.05 (L) 4.00 - 10.00 10*3/mm3    RBC 4.52 3.90 - 5.00 10*6/mm3    Hemoglobin 13.0 11.5 - 14.9 g/dL    Hematocrit 38.6 34.0 - 45.0 %    MCV 85.4 83.0 - 97.0 fL    MCH 28.8 27.0 - 33.0 pg    MCHC 33.7 32.0 - 35.0 g/dL    RDW 15.6 (H) 11.7  - 14.5 %    RDW-SD 47.9 37.0 - 49.0 fl    MPV 9.1 8.9 - 12.1 fL    Platelets 120 (L) 150 - 375 10*3/mm3    Neutrophil % 34.8 (L) 39.0 - 75.0 %    Lymphocyte % 47.9 20.0 - 49.0 %    Monocyte % 11.1 4.0 - 12.0 %    Eosinophil % 4.9 1.0 - 5.0 %    Basophil % 1.0 0.0 - 1.1 %    Immature Grans % 0.3 0.0 - 0.5 %    Neutrophils, Absolute 1.06 (L) 1.50 - 7.00 10*3/mm3    Lymphocytes, Absolute 1.46 1.00 - 3.50 10*3/mm3    Monocytes, Absolute 0.34 0.25 - 0.80 10*3/mm3    Eosinophils, Absolute 0.15 0.00 - 0.36 10*3/mm3    Basophils, Absolute 0.03 0.00 - 0.10 10*3/mm3    Immature Grans, Absolute 0.01 0.00 - 0.03 10*3/mm3    nRBC 0.0 0.0 - 0.0 /100 WBC     No results found.    Assessment/Plan   ASSESSMENT/PLAN:  This is a 56 y.o. female with:  1.  Metastatic rectal cancer, Kras mutated: She had a resection on 10/31/2016 by Brie Clemente and [place].  Unfortunately, PET scan shows evidence for local lydia metastases as well as bilateral pulmonary metastases.    - Palliative FOLFIRI/Avastin started 2/10/17. Multiple dose reductions due to cytopenias. Stopped 5/2017 due to disease progression   - FOLFOX with Avastin initiated 5/23/2017 with initial dose reduction of 25% across all agents and Granix on days 4-6.     - Proceed with cycle 3 FOLFOX/Avastin with Granix. We will add an additional day of Granix due to borderline ANC today as discussed with Dr. Stewart.    - Plan repeat imaging in 1 weeks. Patient desires to image before 6th cycle rather than before 5th cycle.    -Patient will be out of town on 8/1/2017 for a vacation to [place] with her daughter.  Therefore, she request her appointment due on 7/31/2017 to be removed and 2 when she returns which will be 8/8/2017.  She does not wish to come in the following day and therefore requested her to be seen on 8/10/2017 for follow-up with Dr. Paez and treatment.    2. Vitamin B12 deficiency. B12 injections and oral B12 for time being.    3. Neutropenia before intiation of chemotherapy.  Likely secondary to B12 deficiency and chemo.   - Dose adjustments with chemotherapy as above.     4. Cancer related pain. Norco 10 mg every 6 hrs prn. Refilled today.     5. Anxiety. Celexa 20 mg daily.     6. Insomnia. Klonopin 1mg PRN.  Refilled today.    Kaitlin Dickey, APRN

## 2017-07-19 ENCOUNTER — INFUSION (OUTPATIENT)
Dept: ONCOLOGY | Facility: HOSPITAL | Age: 57
End: 2017-07-19

## 2017-07-19 DIAGNOSIS — C20 MALIGNANT NEOPLASM OF RECTUM (HCC): Primary | ICD-10-CM

## 2017-07-19 DIAGNOSIS — Z45.2 FITTING AND ADJUSTMENT OF VASCULAR CATHETER: ICD-10-CM

## 2017-07-19 PROCEDURE — 25010000002 HEPARIN FLUSH (PORCINE) 100 UNIT/ML SOLUTION: Performed by: INTERNAL MEDICINE

## 2017-07-19 PROCEDURE — 96523 IRRIG DRUG DELIVERY DEVICE: CPT | Performed by: INTERNAL MEDICINE

## 2017-07-19 RX ORDER — SODIUM CHLORIDE 0.9 % (FLUSH) 0.9 %
10 SYRINGE (ML) INJECTION AS NEEDED
Status: CANCELLED | OUTPATIENT
Start: 2017-07-19

## 2017-07-19 RX ORDER — SODIUM CHLORIDE 0.9 % (FLUSH) 0.9 %
10 SYRINGE (ML) INJECTION AS NEEDED
Status: DISCONTINUED | OUTPATIENT
Start: 2017-07-19 | End: 2017-07-19 | Stop reason: HOSPADM

## 2017-07-19 RX ADMIN — Medication 10 ML: at 11:20

## 2017-07-19 RX ADMIN — SODIUM CHLORIDE, PRESERVATIVE FREE 500 UNITS: 5 INJECTION INTRAVENOUS at 11:20

## 2017-07-20 ENCOUNTER — INFUSION (OUTPATIENT)
Dept: ONCOLOGY | Facility: HOSPITAL | Age: 57
End: 2017-07-20

## 2017-07-20 ENCOUNTER — APPOINTMENT (OUTPATIENT)
Dept: ONCOLOGY | Facility: HOSPITAL | Age: 57
End: 2017-07-20

## 2017-07-20 VITALS
BODY MASS INDEX: 28.14 KG/M2 | HEART RATE: 92 BPM | SYSTOLIC BLOOD PRESSURE: 160 MMHG | WEIGHT: 162.8 LBS | TEMPERATURE: 98.7 F | DIASTOLIC BLOOD PRESSURE: 87 MMHG

## 2017-07-20 DIAGNOSIS — C20 MALIGNANT NEOPLASM OF RECTUM (HCC): Primary | ICD-10-CM

## 2017-07-20 PROCEDURE — 96372 THER/PROPH/DIAG INJ SC/IM: CPT | Performed by: INTERNAL MEDICINE

## 2017-07-20 PROCEDURE — 25010000002 TBO-FILGRASTIM 480 MCG/0.8ML SOLUTION PREFILLED SYRINGE: Performed by: INTERNAL MEDICINE

## 2017-07-20 RX ADMIN — TBO-FILGRASTIM 480 MCG: 480 INJECTION, SOLUTION SUBCUTANEOUS at 15:30

## 2017-07-21 ENCOUNTER — INFUSION (OUTPATIENT)
Dept: ONCOLOGY | Facility: HOSPITAL | Age: 57
End: 2017-07-21

## 2017-07-21 ENCOUNTER — APPOINTMENT (OUTPATIENT)
Dept: ONCOLOGY | Facility: HOSPITAL | Age: 57
End: 2017-07-21

## 2017-07-21 DIAGNOSIS — C20 MALIGNANT NEOPLASM OF RECTUM (HCC): Primary | ICD-10-CM

## 2017-07-21 PROCEDURE — 96372 THER/PROPH/DIAG INJ SC/IM: CPT | Performed by: NURSE PRACTITIONER

## 2017-07-21 PROCEDURE — 25010000002 TBO-FILGRASTIM 480 MCG/0.8ML SOLUTION PREFILLED SYRINGE: Performed by: INTERNAL MEDICINE

## 2017-07-21 RX ADMIN — TBO-FILGRASTIM 480 MCG: 480 INJECTION, SOLUTION SUBCUTANEOUS at 15:45

## 2017-07-22 ENCOUNTER — INFUSION (OUTPATIENT)
Dept: ONCOLOGY | Facility: HOSPITAL | Age: 57
End: 2017-07-22

## 2017-07-22 VITALS
TEMPERATURE: 97.4 F | DIASTOLIC BLOOD PRESSURE: 73 MMHG | HEART RATE: 91 BPM | OXYGEN SATURATION: 96 % | RESPIRATION RATE: 16 BRPM | SYSTOLIC BLOOD PRESSURE: 142 MMHG

## 2017-07-22 DIAGNOSIS — C20 MALIGNANT NEOPLASM OF RECTUM (HCC): Primary | ICD-10-CM

## 2017-07-22 PROCEDURE — 96372 THER/PROPH/DIAG INJ SC/IM: CPT

## 2017-07-22 PROCEDURE — 25010000002 TBO-FILGRASTIM 480 MCG/0.8ML SOLUTION PREFILLED SYRINGE: Performed by: INTERNAL MEDICINE

## 2017-07-22 RX ADMIN — TBO-FILGRASTIM 480 MCG: 480 INJECTION, SOLUTION SUBCUTANEOUS at 15:45

## 2017-07-23 ENCOUNTER — INFUSION (OUTPATIENT)
Dept: ONCOLOGY | Facility: HOSPITAL | Age: 57
End: 2017-07-23

## 2017-07-23 VITALS
SYSTOLIC BLOOD PRESSURE: 148 MMHG | TEMPERATURE: 97.5 F | RESPIRATION RATE: 18 BRPM | DIASTOLIC BLOOD PRESSURE: 85 MMHG | HEART RATE: 103 BPM

## 2017-07-23 DIAGNOSIS — T45.1X5A CHEMOTHERAPY-INDUCED NEUTROPENIA (HCC): ICD-10-CM

## 2017-07-23 DIAGNOSIS — C20 MALIGNANT NEOPLASM OF RECTUM (HCC): Primary | ICD-10-CM

## 2017-07-23 DIAGNOSIS — D70.1 CHEMOTHERAPY-INDUCED NEUTROPENIA (HCC): ICD-10-CM

## 2017-07-23 PROCEDURE — 25010000002 TBO-FILGRASTIM 480 MCG/0.8ML SOLUTION PREFILLED SYRINGE: Performed by: NURSE PRACTITIONER

## 2017-07-23 PROCEDURE — 96372 THER/PROPH/DIAG INJ SC/IM: CPT

## 2017-07-23 RX ADMIN — TBO-FILGRASTIM 480 MCG: 480 INJECTION, SOLUTION SUBCUTANEOUS at 14:14

## 2017-07-24 ENCOUNTER — HOSPITAL ENCOUNTER (OUTPATIENT)
Dept: PET IMAGING | Facility: HOSPITAL | Age: 57
Discharge: HOME | End: 2017-07-24
Attending: INTERNAL MEDICINE | Admitting: INTERNAL MEDICINE

## 2017-07-24 DIAGNOSIS — C20 MALIGNANT NEOPLASM OF RECTUM (HCC): ICD-10-CM

## 2017-07-24 LAB — CREAT BLDA-MCNC: 0.8 MG/DL (ref 0.6–1.3)

## 2017-07-24 PROCEDURE — 82565 ASSAY OF CREATININE: CPT

## 2017-07-24 PROCEDURE — 74177 CT ABD & PELVIS W/CONTRAST: CPT

## 2017-07-24 PROCEDURE — 0 DIATRIZOATE MEGLUMINE & SODIUM PER 1 ML: Performed by: INTERNAL MEDICINE

## 2017-07-24 PROCEDURE — 71260 CT THORAX DX C+: CPT

## 2017-07-24 PROCEDURE — 0 IOPAMIDOL 61 % SOLUTION: Performed by: INTERNAL MEDICINE

## 2017-07-24 RX ADMIN — DIATRIZOATE MEGLUMINE AND DIATRIZOATE SODIUM 30 ML: 660; 100 LIQUID ORAL; RECTAL at 07:40

## 2017-07-24 RX ADMIN — IOPAMIDOL 85 ML: 612 INJECTION, SOLUTION INTRAVENOUS at 08:40

## 2017-07-31 ENCOUNTER — APPOINTMENT (OUTPATIENT)
Dept: ONCOLOGY | Facility: HOSPITAL | Age: 57
End: 2017-07-31

## 2017-07-31 ENCOUNTER — APPOINTMENT (OUTPATIENT)
Dept: ONCOLOGY | Facility: CLINIC | Age: 57
End: 2017-07-31

## 2017-08-03 ENCOUNTER — APPOINTMENT (OUTPATIENT)
Dept: ONCOLOGY | Facility: HOSPITAL | Age: 57
End: 2017-08-03

## 2017-08-04 ENCOUNTER — APPOINTMENT (OUTPATIENT)
Dept: ONCOLOGY | Facility: HOSPITAL | Age: 57
End: 2017-08-04

## 2017-08-08 DIAGNOSIS — C20 MALIGNANT NEOPLASM OF RECTUM (HCC): ICD-10-CM

## 2017-08-14 ENCOUNTER — OFFICE VISIT (OUTPATIENT)
Dept: ONCOLOGY | Facility: CLINIC | Age: 57
End: 2017-08-14

## 2017-08-14 ENCOUNTER — INFUSION (OUTPATIENT)
Dept: ONCOLOGY | Facility: HOSPITAL | Age: 57
End: 2017-08-14

## 2017-08-14 VITALS
DIASTOLIC BLOOD PRESSURE: 80 MMHG | RESPIRATION RATE: 18 BRPM | BODY MASS INDEX: 29.98 KG/M2 | HEART RATE: 78 BPM | TEMPERATURE: 98.1 F | HEIGHT: 64 IN | OXYGEN SATURATION: 97 % | SYSTOLIC BLOOD PRESSURE: 134 MMHG | WEIGHT: 175.6 LBS

## 2017-08-14 DIAGNOSIS — D70.1 CHEMOTHERAPY-INDUCED NEUTROPENIA (HCC): ICD-10-CM

## 2017-08-14 DIAGNOSIS — C20 MALIGNANT NEOPLASM OF RECTUM (HCC): Primary | ICD-10-CM

## 2017-08-14 DIAGNOSIS — C20 MALIGNANT NEOPLASM OF RECTUM (HCC): ICD-10-CM

## 2017-08-14 DIAGNOSIS — T45.1X5A CHEMOTHERAPY-INDUCED NEUTROPENIA (HCC): ICD-10-CM

## 2017-08-14 DIAGNOSIS — D51.9 ANEMIA DUE TO VITAMIN B12 DEFICIENCY, UNSPECIFIED B12 DEFICIENCY TYPE: ICD-10-CM

## 2017-08-14 LAB
ALBUMIN SERPL-MCNC: 3.5 G/DL (ref 3.5–5.2)
ALBUMIN/GLOB SERPL: 1.2 G/DL (ref 1.1–2.4)
ALP SERPL-CCNC: 68 U/L (ref 38–116)
ALT SERPL W P-5'-P-CCNC: 83 U/L (ref 0–33)
ANION GAP SERPL CALCULATED.3IONS-SCNC: 11.7 MMOL/L
AST SERPL-CCNC: 83 U/L (ref 0–32)
BASOPHILS # BLD AUTO: 0.01 10*3/MM3 (ref 0–0.1)
BASOPHILS NFR BLD AUTO: 0.4 % (ref 0–1.1)
BILIRUB SERPL-MCNC: 0.3 MG/DL (ref 0.1–1.2)
BILIRUB UR QL STRIP: NEGATIVE
BUN BLD-MCNC: 11 MG/DL (ref 6–20)
BUN/CREAT SERPL: 12.6 (ref 7.3–30)
CALCIUM SPEC-SCNC: 8.8 MG/DL (ref 8.5–10.2)
CHLORIDE SERPL-SCNC: 105 MMOL/L (ref 98–107)
CLARITY UR: ABNORMAL
CO2 SERPL-SCNC: 27.3 MMOL/L (ref 22–29)
COLOR UR: YELLOW
CREAT BLD-MCNC: 0.87 MG/DL (ref 0.6–1.1)
DEPRECATED RDW RBC AUTO: 49.9 FL (ref 37–49)
EOSINOPHIL # BLD AUTO: 0.09 10*3/MM3 (ref 0–0.36)
EOSINOPHIL NFR BLD AUTO: 3.2 % (ref 1–5)
ERYTHROCYTE [DISTWIDTH] IN BLOOD BY AUTOMATED COUNT: 15 % (ref 11.7–14.5)
GFR SERPL CREATININE-BSD FRML MDRD: 67 ML/MIN/1.73
GFR SERPL CREATININE-BSD FRML MDRD: 81 ML/MIN/1.73
GLOBULIN UR ELPH-MCNC: 2.9 GM/DL (ref 1.8–3.5)
GLUCOSE BLD-MCNC: 115 MG/DL (ref 74–124)
GLUCOSE UR STRIP-MCNC: NEGATIVE MG/DL
HCT VFR BLD AUTO: 35.3 % (ref 34–45)
HGB BLD-MCNC: 11.6 G/DL (ref 11.5–14.9)
HGB UR QL STRIP.AUTO: ABNORMAL
IMM GRANULOCYTES # BLD: 0.01 10*3/MM3 (ref 0–0.03)
IMM GRANULOCYTES NFR BLD: 0.4 % (ref 0–0.5)
KETONES UR QL STRIP: NEGATIVE
LEUKOCYTE ESTERASE UR QL STRIP.AUTO: ABNORMAL
LYMPHOCYTES # BLD AUTO: 1.39 10*3/MM3 (ref 1–3.5)
LYMPHOCYTES NFR BLD AUTO: 49.1 % (ref 20–49)
MCH RBC QN AUTO: 29.5 PG (ref 27–33)
MCHC RBC AUTO-ENTMCNC: 32.9 G/DL (ref 32–35)
MCV RBC AUTO: 89.8 FL (ref 83–97)
MONOCYTES # BLD AUTO: 0.34 10*3/MM3 (ref 0.25–0.8)
MONOCYTES NFR BLD AUTO: 12 % (ref 4–12)
NEUTROPHILS # BLD AUTO: 0.99 10*3/MM3 (ref 1.5–7)
NEUTROPHILS NFR BLD AUTO: 34.9 % (ref 39–75)
NITRITE UR QL STRIP: NEGATIVE
NRBC BLD MANUAL-RTO: 0 /100 WBC (ref 0–0)
PH UR STRIP.AUTO: 5.5 [PH] (ref 4.5–8)
PLATELET # BLD AUTO: 145 10*3/MM3 (ref 150–375)
PMV BLD AUTO: 9.1 FL (ref 8.9–12.1)
POTASSIUM BLD-SCNC: 4.4 MMOL/L (ref 3.5–4.7)
PROT SERPL-MCNC: 6.4 G/DL (ref 6.3–8)
PROT UR QL STRIP: NEGATIVE
RBC # BLD AUTO: 3.93 10*6/MM3 (ref 3.9–5)
SODIUM BLD-SCNC: 144 MMOL/L (ref 134–145)
SP GR UR STRIP: 1.02 (ref 1–1.03)
UROBILINOGEN UR QL STRIP: ABNORMAL
VIT B12 BLD-MCNC: >2000 PG/ML (ref 211–946)
WBC NRBC COR # BLD: 2.83 10*3/MM3 (ref 4–10)

## 2017-08-14 PROCEDURE — 36415 COLL VENOUS BLD VENIPUNCTURE: CPT | Performed by: INTERNAL MEDICINE

## 2017-08-14 PROCEDURE — 85025 COMPLETE CBC W/AUTO DIFF WBC: CPT

## 2017-08-14 PROCEDURE — 96417 CHEMO IV INFUS EACH ADDL SEQ: CPT | Performed by: INTERNAL MEDICINE

## 2017-08-14 PROCEDURE — 99214 OFFICE O/P EST MOD 30 MIN: CPT | Performed by: INTERNAL MEDICINE

## 2017-08-14 PROCEDURE — 25010000002 PALONOSETRON PER 25 MCG: Performed by: INTERNAL MEDICINE

## 2017-08-14 PROCEDURE — 96375 TX/PRO/DX INJ NEW DRUG ADDON: CPT | Performed by: INTERNAL MEDICINE

## 2017-08-14 PROCEDURE — 96415 CHEMO IV INFUSION ADDL HR: CPT | Performed by: INTERNAL MEDICINE

## 2017-08-14 PROCEDURE — 82607 VITAMIN B-12: CPT | Performed by: INTERNAL MEDICINE

## 2017-08-14 PROCEDURE — 80053 COMPREHEN METABOLIC PANEL: CPT

## 2017-08-14 PROCEDURE — 96413 CHEMO IV INFUSION 1 HR: CPT | Performed by: INTERNAL MEDICINE

## 2017-08-14 PROCEDURE — 25010000002 DEXAMETHASONE PER 1 MG: Performed by: INTERNAL MEDICINE

## 2017-08-14 PROCEDURE — 81003 URINALYSIS AUTO W/O SCOPE: CPT

## 2017-08-14 PROCEDURE — 96368 THER/DIAG CONCURRENT INF: CPT | Performed by: INTERNAL MEDICINE

## 2017-08-14 PROCEDURE — 25010000002 LEUCOVORIN CALCIUM PER 50 MG: Performed by: INTERNAL MEDICINE

## 2017-08-14 PROCEDURE — 25010000002 BEVACIZUMAB PER 10 MG: Performed by: INTERNAL MEDICINE

## 2017-08-14 PROCEDURE — 96416 CHEMO PROLONG INFUSE W/PUMP: CPT | Performed by: INTERNAL MEDICINE

## 2017-08-14 PROCEDURE — 25010000002 FLUOROURACIL PER 500 MG: Performed by: INTERNAL MEDICINE

## 2017-08-14 PROCEDURE — 25010000002 OXALIPLATIN PER 0.5 MG: Performed by: INTERNAL MEDICINE

## 2017-08-14 RX ORDER — DEXTROSE MONOHYDRATE 50 MG/ML
250 INJECTION, SOLUTION INTRAVENOUS ONCE
Status: COMPLETED | OUTPATIENT
Start: 2017-08-14 | End: 2017-08-14

## 2017-08-14 RX ORDER — CIPROFLOXACIN 500 MG/1
500 TABLET, FILM COATED ORAL 2 TIMES DAILY
Qty: 10 TABLET | Refills: 0 | Status: SHIPPED | OUTPATIENT
Start: 2017-08-14 | End: 2017-08-19

## 2017-08-14 RX ORDER — PALONOSETRON 0.05 MG/ML
0.25 INJECTION, SOLUTION INTRAVENOUS ONCE
Status: CANCELLED | OUTPATIENT
Start: 2017-08-14

## 2017-08-14 RX ORDER — DEXTROSE MONOHYDRATE 50 MG/ML
250 INJECTION, SOLUTION INTRAVENOUS ONCE
Status: CANCELLED | OUTPATIENT
Start: 2017-08-14

## 2017-08-14 RX ORDER — SODIUM CHLORIDE 9 MG/ML
250 INJECTION, SOLUTION INTRAVENOUS ONCE
Status: COMPLETED | OUTPATIENT
Start: 2017-08-14 | End: 2017-08-14

## 2017-08-14 RX ORDER — PALONOSETRON 0.05 MG/ML
0.25 INJECTION, SOLUTION INTRAVENOUS ONCE
Status: COMPLETED | OUTPATIENT
Start: 2017-08-14 | End: 2017-08-14

## 2017-08-14 RX ORDER — SODIUM CHLORIDE 9 MG/ML
250 INJECTION, SOLUTION INTRAVENOUS ONCE
Status: CANCELLED | OUTPATIENT
Start: 2017-08-14

## 2017-08-14 RX ADMIN — DEXTROSE MONOHYDRATE 250 ML: 5 INJECTION, SOLUTION INTRAVENOUS at 10:00

## 2017-08-14 RX ADMIN — DEXAMETHASONE SODIUM PHOSPHATE 12 MG: 10 INJECTION INTRAMUSCULAR; INTRAVENOUS at 08:57

## 2017-08-14 RX ADMIN — SODIUM CHLORIDE 250 ML: 900 INJECTION, SOLUTION INTRAVENOUS at 08:48

## 2017-08-14 RX ADMIN — LEUCOVORIN CALCIUM 540 MG: 350 INJECTION, POWDER, LYOPHILIZED, FOR SOLUTION INTRAMUSCULAR; INTRAVENOUS at 10:00

## 2017-08-14 RX ADMIN — OXALIPLATIN 115 MG: 5 INJECTION, SOLUTION INTRAVENOUS at 10:00

## 2017-08-14 RX ADMIN — BEVACIZUMAB 370 MG: 400 INJECTION, SOLUTION INTRAVENOUS at 09:20

## 2017-08-14 RX ADMIN — FLUOROURACIL 2900 MG: 50 INJECTION, SOLUTION INTRAVENOUS at 12:03

## 2017-08-14 RX ADMIN — PALONOSETRON HYDROCHLORIDE 0.25 MG: 0.25 INJECTION INTRAVENOUS at 08:57

## 2017-08-14 NOTE — PROGRESS NOTES
REVIEWED UA AND ELEVATED AST WITH DR ASHRAF.   SHE IS OK WITH THE AST AND WANTS THE PATIENT TO START CIPRO 500MG BID FOR 5 DAYS. THIS WAS SENT TO HER PHARMACY.

## 2017-08-14 NOTE — PROGRESS NOTES
[facility] GROUP OUTPATIENT FOLLOW UP CLINIC VISIT    REASON FOR FOLLOW-UP:    Malignant neoplasm of rectum    Staging form: Colon and Rectum, AJCC V7      Pathologic: Stage IIIC (T4b, N1b, cM0) - Signed by Jude Stewart MD on 12/9/2016        Staging comments: Suspected lung metastases.        Clinical: Stage TRUDY (T4b, N1b, M1a) - Signed by Jude Stewart MD on 12/19/2016  Kras mutation positive.  BRAF negative.  Microsatellite stable.      HISTORY OF PRESENT ILLNESS:  Benigno Quispe is a 57 y.o. female who returns for  cycle 6 FOLFOX.  She had scans done at the end of July which are detailed below but are essentially stable.  She is feeling very well without any new complaints.  She notes that she doesn't have significant pressure in the rectal area like she did previously.  She denies any fevers, chills, night sweats, bladder or bowel changes.  No fevers.  She does have mild fatigue.  No peripheral neuropathy.    ONCOLOGIC HISTORY:     Malignant neoplasm of rectum    10/6/2016 Biopsy     Upper and lower endoscopy by Dr. Keller:  Rectal mass showing invasive moderately differentiated adenocarcinoma.           10/12/2016 Imaging     CT imaging showed bilateral pulmonary nodules with the largest in the left lower lobe measuring 11 mm, concerning for pulmonary metastases. A mass in the pelvis measured about 4 x 3 cm with a 15 mm lymph node in the right pelvis.         10/31/2016 Surgery     APR with posterior vaginectomy by Brie Clemente and Marisa Burris. Pathology showed a 5.5 cm low-grade well to moderately differentiated adenocarcinoma with 2 of 20 lymph nodes positive for metastatic disease and a positive radial margin.         10/31/2016 Imaging     PET scan:  Multiple bilateral hypermetabolic pulmonary nodules likely  represent metastases. The hypermetabolic nodes along both pelvic  sidewalls are suspected to be metastatic. The activity along the anal  canal and the activity within shotty bilateral  "groin nodes is  indeterminate.         2/13/2017 -  Chemotherapy     OP COLORECTAL FOLFIRI + Bevacizumab (Irinotecan / Leucovorin / Fluorouracil / Bevacizumab)           5/15/2017 Progression            5/23/2017 -  Chemotherapy     OP COLON mFOLFOX6 + Bevacizumab (OXALIplatin / Leucovorin / Fluorouracil / Bevacizumab)              PAST MEDICAL, SURGICAL, FAMILY, AND SOCIAL HISTORIES were reviewed with the patient and in the electronic medical record, and were updated if indicated.    ALLERGIES:  Allergies   Allergen Reactions   • Adhesive Tape        MEDICATIONS:  The medication list has been reviewed with the patient by the medical assistant, and the list has been updated in the electronic medical record, which I reviewed.  Medication dosages and frequencies were confirmed to be accurate.    REVIEW OF SYSTEMS:  Review of Systems   Constitutional: Positive for fatigue. Negative for activity change, appetite change, chills, diaphoresis, fever and unexpected weight change.   Respiratory: Negative for cough, chest tightness and shortness of breath.    Cardiovascular: Negative for chest pain and leg swelling.   Gastrointestinal: Negative for abdominal pain, blood in stool, constipation, diarrhea, nausea and vomiting.   Endocrine: Negative.    Genitourinary: Negative.    Musculoskeletal: Negative for arthralgias, joint swelling and myalgias.   Allergic/Immunologic: Negative.    Neurological: Negative.    Hematological: Negative for adenopathy. Does not bruise/bleed easily.   Psychiatric/Behavioral: Negative for agitation, confusion and dysphoric mood. The patient is not nervous/anxious.      Objective     Vitals:    08/14/17 0808   BP: 134/80   Pulse: 78   Resp: 18   Temp: 98.1 °F (36.7 °C)   SpO2: 97%   Weight: 175 lb 9.6 oz (79.7 kg)   Height: 63.78\" (162 cm)   PainSc: 0-No pain     GENERAL:  Well-developed, well-nourished female in no acute distress.   SKIN:  Warm, dry without rashes, purpura or petechiae.  HEAD:  " Normocephalic.  EYES:  EOMs intact.  Conjunctivae normal.  EARS:  Hearing intact.  NOSE:  Septum midline.  No excoriations or nasal discharge.  MOUTH:  Tongue is well-papillated; no stomatitis or ulcers.  Lips normal.  CHEST:  Lungs clear to auscultation. Good airflow. mediport accessed  CARDIAC:  Regular rate and rhythm without murmurs, rubs or gallops. Normal S1,S2.  ABDOMEN:  Soft, nontender, no hepatosplenomegaly, normal bowel sounds  EXTREMITIES:  No clubbing, cyanosis or edema.  PSYCHIATRIC:  Normal affect and mood.         DIAGNOSTIC DATA:  Results for orders placed or performed in visit on 08/14/17   Urinalysis   Result Value Ref Range    Color, UA Yellow Yellow, Straw    Appearance, UA Slightly Cloudy (A) Clear    pH, UA 5.5 4.5 - 8.0    Specific Gravity, UA 1.020 1.002 - 1.030    Glucose, UA Negative Negative    Ketones, UA Negative Negative    Bilirubin, UA Negative Negative    Blood, UA Small (1+) (A) Negative    Protein, UA Negative Negative    Leuk Esterase, UA Moderate (2+) (A) Negative    Nitrite, UA Negative Negative    Urobilinogen, UA 0.2 E.U./dL 0.2 - 1.0 E.U./dL   CBC Auto Differential   Result Value Ref Range    WBC 2.83 (L) 4.00 - 10.00 10*3/mm3    RBC 3.93 3.90 - 5.00 10*6/mm3    Hemoglobin 11.6 11.5 - 14.9 g/dL    Hematocrit 35.3 34.0 - 45.0 %    MCV 89.8 83.0 - 97.0 fL    MCH 29.5 27.0 - 33.0 pg    MCHC 32.9 32.0 - 35.0 g/dL    RDW 15.0 (H) 11.7 - 14.5 %    RDW-SD 49.9 (H) 37.0 - 49.0 fl    MPV 9.1 8.9 - 12.1 fL    Platelets 145 (L) 150 - 375 10*3/mm3    Neutrophil % 34.9 (L) 39.0 - 75.0 %    Lymphocyte % 49.1 (H) 20.0 - 49.0 %    Monocyte % 12.0 4.0 - 12.0 %    Eosinophil % 3.2 1.0 - 5.0 %    Basophil % 0.4 0.0 - 1.1 %    Immature Grans % 0.4 0.0 - 0.5 %    Neutrophils, Absolute 0.99 (L) 1.50 - 7.00 10*3/mm3    Lymphocytes, Absolute 1.39 1.00 - 3.50 10*3/mm3    Monocytes, Absolute 0.34 0.25 - 0.80 10*3/mm3    Eosinophils, Absolute 0.09 0.00 - 0.36 10*3/mm3    Basophils, Absolute 0.01 0.00  - 0.10 10*3/mm3    Immature Grans, Absolute 0.01 0.00 - 0.03 10*3/mm3    nRBC 0.0 0.0 - 0.0 /100 WBC     **I reviewed scans myself and concur with the below dictation.    Ct Chest With Contrast    Result Date: 7/25/2017  Narrative: CT CHEST WITH CONTRAST-, CT ABDOMEN AND PELVIS WITH CONTRAST-  CLINICAL INFORMATION:  Follow-up metastatic rectal carcinoma, reevaluate pulmonary nodules.  COMPARISON: 05/10/2017  CT CHEST FINDINGS: There are again demonstrated multiple metastatic deposits within both lungs. A few demonstrate subtle interval enlargement, a reference right lower lobe nodule is currently 12 mm in diameter compared to 10 mm on the previous examination. No new pulmonary nodule has developed. No pleural effusion or acute airspace disease.  No mediastinal or hilar adenopathy. Mild cardiac enlargement, stable. No pericardial or esophageal abnormality is seen.  There is a Mediport catheter in position.  CONCLUSION: Many of the pulmonary metastases remain stable, a few have demonstrated subtle interval enlargement since 05/10/2017.  CT SCAN OF THE ABDOMEN AND PELVIS FINDINGS: The liver and gallbladder are normal, no biliary duct dilatation. The pancreas is satisfactory in appearance. Splenic size upper limits of normal, stable. Both adrenal glands are typical in appearance. Both kidneys are normal. No mass calculus or obstructive uropathy. Retroaortic left renal vein, anatomic variant. Very small stable retroperitoneal lymph nodes, no adenopathy. The left lower quadrant colostomy is typical in appearance, stable in the interim. The urinary bladder is collapsed. There is again demonstrated a heavily calcified 3 cm uterine fundal fibroid. Both ovaries are symmetric and satisfactory in appearance. Stomach is satisfactory in appearance. No ascites or evidence to suggest carcinomatosis. No lytic or sclerotic bone lesion identified.  CONCLUSION: There is no indication of local tumor recurrence or abdominal/pelvic  metastatic disease.  This report was finalized on 7/25/2017 10:09 AM by Dr. Jude Liu MD.      Ct Abdomen Pelvis With Contrast    Result Date: 7/25/2017  Narrative: CT CHEST WITH CONTRAST-, CT ABDOMEN AND PELVIS WITH CONTRAST-  CLINICAL INFORMATION:  Follow-up metastatic rectal carcinoma, reevaluate pulmonary nodules.  COMPARISON: 05/10/2017  CT CHEST FINDINGS: There are again demonstrated multiple metastatic deposits within both lungs. A few demonstrate subtle interval enlargement, a reference right lower lobe nodule is currently 12 mm in diameter compared to 10 mm on the previous examination. No new pulmonary nodule has developed. No pleural effusion or acute airspace disease.  No mediastinal or hilar adenopathy. Mild cardiac enlargement, stable. No pericardial or esophageal abnormality is seen.  There is a Mediport catheter in position.  CONCLUSION: Many of the pulmonary metastases remain stable, a few have demonstrated subtle interval enlargement since 05/10/2017.  CT SCAN OF THE ABDOMEN AND PELVIS FINDINGS: The liver and gallbladder are normal, no biliary duct dilatation. The pancreas is satisfactory in appearance. Splenic size upper limits of normal, stable. Both adrenal glands are typical in appearance. Both kidneys are normal. No mass calculus or obstructive uropathy. Retroaortic left renal vein, anatomic variant. Very small stable retroperitoneal lymph nodes, no adenopathy. The left lower quadrant colostomy is typical in appearance, stable in the interim. The urinary bladder is collapsed. There is again demonstrated a heavily calcified 3 cm uterine fundal fibroid. Both ovaries are symmetric and satisfactory in appearance. Stomach is satisfactory in appearance. No ascites or evidence to suggest carcinomatosis. No lytic or sclerotic bone lesion identified.  CONCLUSION: There is no indication of local tumor recurrence or abdominal/pelvic metastatic disease.  This report was finalized on 7/25/2017 10:09 AM  by Dr. Jude Liu MD.        Assessment/Plan   ASSESSMENT/PLAN:  This is a 57 y.o. female with:  1.  Metastatic rectal cancer, Kras mutated: She had a resection on 10/31/2016 by Brie Clemente and [place].  Unfortunately, PET scan shows evidence for local lydia metastases as well as bilateral pulmonary metastases.    - Palliative FOLFIRI/Avastin started 2/10/17. Multiple dose reductions due to cytopenias. Stopped 5/2017 due to disease progression   - FOLFOX with Avastin initiated 5/23/2017 with initial dose reduction of 25% across all agents and Granix. 5-FU further dose reduced on 8/14/17.     - Proceed with cycle 6 FOLFOX/Avastin with Granix days 4-7.    - Recent scans in late July 2017 show essentially stability.  There is very mild growth in the lung however is very small on terms of 2 mm, thus I don't consider it enough growth to change her treatment.     - We'll further dose reduce the 5-FU due to neutropenia however I do want to proceed forward with chemotherapy today despite the neutropenia that she has not had chemotherapy in 1 month.  She will be receiving Granix days 4 through 7.  She understands this risk is agreeable to proceed forward.    2. Vitamin B12 deficiency. B12 injections in the past and currently on 2000 mcg oral B12 for time being.   * Will check B12 level as may be contributor to her neutropenia    3. Neutropenia before intiation of chemotherapy. Likely secondary to B12 deficiency and chemo.   - Dose adjustments with chemotherapy as above. Still neutropenic despite no chemotherapy in 1 month.    4. Cancer related pain. Norco 10 mg every 6 hrs prn.     5. Anxiety. Celexa 20 mg daily.     6. Insomnia. Klonopin 1mg PRN.      Pilar Paez MD

## 2017-08-16 ENCOUNTER — INFUSION (OUTPATIENT)
Dept: ONCOLOGY | Facility: HOSPITAL | Age: 57
End: 2017-08-16

## 2017-08-16 VITALS
WEIGHT: 170 LBS | TEMPERATURE: 98 F | HEART RATE: 75 BPM | DIASTOLIC BLOOD PRESSURE: 93 MMHG | BODY MASS INDEX: 29.38 KG/M2 | SYSTOLIC BLOOD PRESSURE: 143 MMHG

## 2017-08-16 DIAGNOSIS — C20 MALIGNANT NEOPLASM OF RECTUM (HCC): Primary | ICD-10-CM

## 2017-08-16 DIAGNOSIS — Z45.2 FITTING AND ADJUSTMENT OF VASCULAR CATHETER: ICD-10-CM

## 2017-08-16 PROCEDURE — 96523 IRRIG DRUG DELIVERY DEVICE: CPT | Performed by: INTERNAL MEDICINE

## 2017-08-16 PROCEDURE — 25010000002 HEPARIN FLUSH (PORCINE) 100 UNIT/ML SOLUTION: Performed by: INTERNAL MEDICINE

## 2017-08-16 RX ORDER — SODIUM CHLORIDE 0.9 % (FLUSH) 0.9 %
10 SYRINGE (ML) INJECTION AS NEEDED
Status: DISCONTINUED | OUTPATIENT
Start: 2017-08-16 | End: 2017-08-16 | Stop reason: HOSPADM

## 2017-08-16 RX ORDER — SODIUM CHLORIDE 0.9 % (FLUSH) 0.9 %
10 SYRINGE (ML) INJECTION AS NEEDED
Status: CANCELLED | OUTPATIENT
Start: 2017-08-16

## 2017-08-16 RX ADMIN — Medication 10 ML: at 10:55

## 2017-08-16 RX ADMIN — SODIUM CHLORIDE, PRESERVATIVE FREE 500 UNITS: 5 INJECTION INTRAVENOUS at 10:55

## 2017-08-17 ENCOUNTER — INFUSION (OUTPATIENT)
Dept: ONCOLOGY | Facility: HOSPITAL | Age: 57
End: 2017-08-17

## 2017-08-17 VITALS — BODY MASS INDEX: 29.31 KG/M2 | WEIGHT: 169.6 LBS

## 2017-08-17 DIAGNOSIS — C20 MALIGNANT NEOPLASM OF RECTUM (HCC): Primary | ICD-10-CM

## 2017-08-17 PROCEDURE — 25010000002 TBO-FILGRASTIM 480 MCG/0.8ML SOLUTION PREFILLED SYRINGE: Performed by: INTERNAL MEDICINE

## 2017-08-17 PROCEDURE — 96372 THER/PROPH/DIAG INJ SC/IM: CPT | Performed by: INTERNAL MEDICINE

## 2017-08-17 RX ADMIN — TBO-FILGRASTIM 480 MCG: 480 INJECTION, SOLUTION SUBCUTANEOUS at 16:07

## 2017-08-18 ENCOUNTER — INFUSION (OUTPATIENT)
Dept: ONCOLOGY | Facility: HOSPITAL | Age: 57
End: 2017-08-18

## 2017-08-18 DIAGNOSIS — C20 MALIGNANT NEOPLASM OF RECTUM (HCC): Primary | ICD-10-CM

## 2017-08-18 PROCEDURE — 25010000002 TBO-FILGRASTIM 480 MCG/0.8ML SOLUTION PREFILLED SYRINGE: Performed by: INTERNAL MEDICINE

## 2017-08-18 PROCEDURE — 96372 THER/PROPH/DIAG INJ SC/IM: CPT | Performed by: INTERNAL MEDICINE

## 2017-08-18 RX ADMIN — TBO-FILGRASTIM 480 MCG: 480 INJECTION, SOLUTION SUBCUTANEOUS at 16:06

## 2017-08-19 ENCOUNTER — INFUSION (OUTPATIENT)
Dept: ONCOLOGY | Facility: HOSPITAL | Age: 57
End: 2017-08-19

## 2017-08-19 VITALS
RESPIRATION RATE: 18 BRPM | HEART RATE: 91 BPM | SYSTOLIC BLOOD PRESSURE: 143 MMHG | TEMPERATURE: 98 F | DIASTOLIC BLOOD PRESSURE: 78 MMHG | OXYGEN SATURATION: 100 %

## 2017-08-19 DIAGNOSIS — C20 MALIGNANT NEOPLASM OF RECTUM (HCC): Primary | ICD-10-CM

## 2017-08-19 PROCEDURE — 96372 THER/PROPH/DIAG INJ SC/IM: CPT

## 2017-08-19 PROCEDURE — 25010000002 TBO-FILGRASTIM 480 MCG/0.8ML SOLUTION PREFILLED SYRINGE: Performed by: INTERNAL MEDICINE

## 2017-08-19 RX ADMIN — TBO-FILGRASTIM 480 MCG: 480 INJECTION, SOLUTION SUBCUTANEOUS at 15:51

## 2017-08-23 DIAGNOSIS — C20 MALIGNANT NEOPLASM OF RECTUM (HCC): ICD-10-CM

## 2017-08-28 ENCOUNTER — INFUSION (OUTPATIENT)
Dept: ONCOLOGY | Facility: HOSPITAL | Age: 57
End: 2017-08-28

## 2017-08-28 VITALS
SYSTOLIC BLOOD PRESSURE: 154 MMHG | BODY MASS INDEX: 28.73 KG/M2 | DIASTOLIC BLOOD PRESSURE: 83 MMHG | TEMPERATURE: 98.7 F | HEART RATE: 82 BPM | WEIGHT: 166.2 LBS

## 2017-08-28 DIAGNOSIS — D70.8 OTHER NEUTROPENIA (HCC): Primary | ICD-10-CM

## 2017-08-28 DIAGNOSIS — C20 MALIGNANT NEOPLASM OF RECTUM (HCC): ICD-10-CM

## 2017-08-28 DIAGNOSIS — C20 MALIGNANT NEOPLASM OF RECTUM (HCC): Primary | ICD-10-CM

## 2017-08-28 LAB
ALBUMIN SERPL-MCNC: 4.2 G/DL (ref 3.5–5.2)
ALBUMIN/GLOB SERPL: 1.3 G/DL (ref 1.1–2.4)
ALP SERPL-CCNC: 70 U/L (ref 38–116)
ALT SERPL W P-5'-P-CCNC: 25 U/L (ref 0–33)
ANION GAP SERPL CALCULATED.3IONS-SCNC: 13.4 MMOL/L
AST SERPL-CCNC: 24 U/L (ref 0–32)
BASOPHILS # BLD AUTO: 0.02 10*3/MM3 (ref 0–0.1)
BASOPHILS NFR BLD AUTO: 0.5 % (ref 0–1.1)
BILIRUB SERPL-MCNC: 0.5 MG/DL (ref 0.1–1.2)
BILIRUB UR QL STRIP: NEGATIVE
BUN BLD-MCNC: 13 MG/DL (ref 6–20)
BUN/CREAT SERPL: 19.4 (ref 7.3–30)
CALCIUM SPEC-SCNC: 9.6 MG/DL (ref 8.5–10.2)
CHLORIDE SERPL-SCNC: 102 MMOL/L (ref 98–107)
CLARITY UR: ABNORMAL
CO2 SERPL-SCNC: 25.6 MMOL/L (ref 22–29)
COLOR UR: YELLOW
CREAT BLD-MCNC: 0.67 MG/DL (ref 0.6–1.1)
DEPRECATED RDW RBC AUTO: 47.2 FL (ref 37–49)
EOSINOPHIL # BLD AUTO: 0.09 10*3/MM3 (ref 0–0.36)
EOSINOPHIL NFR BLD AUTO: 2.5 % (ref 1–5)
ERYTHROCYTE [DISTWIDTH] IN BLOOD BY AUTOMATED COUNT: 14.9 % (ref 11.7–14.5)
GFR SERPL CREATININE-BSD FRML MDRD: 110 ML/MIN/1.73
GFR SERPL CREATININE-BSD FRML MDRD: 91 ML/MIN/1.73
GLOBULIN UR ELPH-MCNC: 3.2 GM/DL (ref 1.8–3.5)
GLUCOSE BLD-MCNC: 96 MG/DL (ref 74–124)
GLUCOSE UR STRIP-MCNC: NEGATIVE MG/DL
HCT VFR BLD AUTO: 40.1 % (ref 34–45)
HGB BLD-MCNC: 13.7 G/DL (ref 11.5–14.9)
HGB UR QL STRIP.AUTO: ABNORMAL
HOLD SPECIMEN: NORMAL
IMM GRANULOCYTES # BLD: 0.02 10*3/MM3 (ref 0–0.03)
IMM GRANULOCYTES NFR BLD: 0.5 % (ref 0–0.5)
KETONES UR QL STRIP: NEGATIVE
LEUKOCYTE ESTERASE UR QL STRIP.AUTO: ABNORMAL
LYMPHOCYTES # BLD AUTO: 1.53 10*3/MM3 (ref 1–3.5)
LYMPHOCYTES NFR BLD AUTO: 41.7 % (ref 20–49)
MCH RBC QN AUTO: 29.9 PG (ref 27–33)
MCHC RBC AUTO-ENTMCNC: 34.2 G/DL (ref 32–35)
MCV RBC AUTO: 87.6 FL (ref 83–97)
MONOCYTES # BLD AUTO: 0.38 10*3/MM3 (ref 0.25–0.8)
MONOCYTES NFR BLD AUTO: 10.4 % (ref 4–12)
NEUTROPHILS # BLD AUTO: 1.63 10*3/MM3 (ref 1.5–7)
NEUTROPHILS NFR BLD AUTO: 44.4 % (ref 39–75)
NITRITE UR QL STRIP: NEGATIVE
NRBC BLD MANUAL-RTO: 0 /100 WBC (ref 0–0)
PH UR STRIP.AUTO: <=5 [PH] (ref 4.5–8)
PLATELET # BLD AUTO: 139 10*3/MM3 (ref 150–375)
PMV BLD AUTO: 9.2 FL (ref 8.9–12.1)
POTASSIUM BLD-SCNC: 4.3 MMOL/L (ref 3.5–4.7)
PROT SERPL-MCNC: 7.4 G/DL (ref 6.3–8)
PROT UR QL STRIP: NEGATIVE
RBC # BLD AUTO: 4.58 10*6/MM3 (ref 3.9–5)
SODIUM BLD-SCNC: 141 MMOL/L (ref 134–145)
SP GR UR STRIP: 1.02 (ref 1–1.03)
UROBILINOGEN UR QL STRIP: ABNORMAL
WBC NRBC COR # BLD: 3.67 10*3/MM3 (ref 4–10)

## 2017-08-28 PROCEDURE — 25010000002 BEVACIZUMAB PER 10 MG: Performed by: INTERNAL MEDICINE

## 2017-08-28 PROCEDURE — 96368 THER/DIAG CONCURRENT INF: CPT | Performed by: INTERNAL MEDICINE

## 2017-08-28 PROCEDURE — 80053 COMPREHEN METABOLIC PANEL: CPT

## 2017-08-28 PROCEDURE — 85025 COMPLETE CBC W/AUTO DIFF WBC: CPT

## 2017-08-28 PROCEDURE — 96416 CHEMO PROLONG INFUSE W/PUMP: CPT | Performed by: INTERNAL MEDICINE

## 2017-08-28 PROCEDURE — 25010000002 FLUOROURACIL PER 500 MG: Performed by: INTERNAL MEDICINE

## 2017-08-28 PROCEDURE — 81003 URINALYSIS AUTO W/O SCOPE: CPT

## 2017-08-28 PROCEDURE — 96375 TX/PRO/DX INJ NEW DRUG ADDON: CPT | Performed by: INTERNAL MEDICINE

## 2017-08-28 PROCEDURE — 25010000002 PALONOSETRON PER 25 MCG: Performed by: INTERNAL MEDICINE

## 2017-08-28 PROCEDURE — 96413 CHEMO IV INFUSION 1 HR: CPT | Performed by: INTERNAL MEDICINE

## 2017-08-28 PROCEDURE — 25010000002 DEXAMETHASONE PER 1 MG: Performed by: INTERNAL MEDICINE

## 2017-08-28 PROCEDURE — 25010000002 OXALIPLATIN PER 0.5 MG: Performed by: INTERNAL MEDICINE

## 2017-08-28 PROCEDURE — 96417 CHEMO IV INFUS EACH ADDL SEQ: CPT | Performed by: INTERNAL MEDICINE

## 2017-08-28 PROCEDURE — 96415 CHEMO IV INFUSION ADDL HR: CPT | Performed by: INTERNAL MEDICINE

## 2017-08-28 PROCEDURE — 25010000002 LEUCOVORIN CALCIUM PER 50 MG: Performed by: INTERNAL MEDICINE

## 2017-08-28 RX ORDER — CLONAZEPAM 1 MG/1
1 TABLET ORAL 2 TIMES DAILY PRN
Qty: 60 TABLET | Refills: 0 | Status: SHIPPED | OUTPATIENT
Start: 2017-08-28 | End: 2017-09-25 | Stop reason: SDUPTHER

## 2017-08-28 RX ORDER — DEXTROSE MONOHYDRATE 50 MG/ML
250 INJECTION, SOLUTION INTRAVENOUS ONCE
Status: COMPLETED | OUTPATIENT
Start: 2017-08-28 | End: 2017-08-28

## 2017-08-28 RX ORDER — SODIUM CHLORIDE 9 MG/ML
250 INJECTION, SOLUTION INTRAVENOUS ONCE
Status: CANCELLED | OUTPATIENT
Start: 2017-08-28

## 2017-08-28 RX ORDER — HYDROCODONE BITARTRATE AND ACETAMINOPHEN 10; 325 MG/1; MG/1
1 TABLET ORAL EVERY 6 HOURS PRN
Qty: 120 TABLET | Refills: 0 | Status: SHIPPED | OUTPATIENT
Start: 2017-08-28 | End: 2017-09-25 | Stop reason: SDUPTHER

## 2017-08-28 RX ORDER — SODIUM CHLORIDE 9 MG/ML
250 INJECTION, SOLUTION INTRAVENOUS ONCE
Status: COMPLETED | OUTPATIENT
Start: 2017-08-28 | End: 2017-08-28

## 2017-08-28 RX ORDER — PALONOSETRON 0.05 MG/ML
0.25 INJECTION, SOLUTION INTRAVENOUS ONCE
Status: CANCELLED | OUTPATIENT
Start: 2017-09-11

## 2017-08-28 RX ORDER — DEXTROSE MONOHYDRATE 50 MG/ML
250 INJECTION, SOLUTION INTRAVENOUS ONCE
Status: CANCELLED | OUTPATIENT
Start: 2017-08-28

## 2017-08-28 RX ORDER — PALONOSETRON 0.05 MG/ML
0.25 INJECTION, SOLUTION INTRAVENOUS ONCE
Status: CANCELLED | OUTPATIENT
Start: 2017-08-28

## 2017-08-28 RX ORDER — SODIUM CHLORIDE 9 MG/ML
250 INJECTION, SOLUTION INTRAVENOUS ONCE
Status: CANCELLED | OUTPATIENT
Start: 2017-09-11

## 2017-08-28 RX ORDER — PALONOSETRON 0.05 MG/ML
0.25 INJECTION, SOLUTION INTRAVENOUS ONCE
Status: COMPLETED | OUTPATIENT
Start: 2017-08-28 | End: 2017-08-28

## 2017-08-28 RX ORDER — DEXTROSE MONOHYDRATE 50 MG/ML
250 INJECTION, SOLUTION INTRAVENOUS ONCE
Status: CANCELLED | OUTPATIENT
Start: 2017-09-11

## 2017-08-28 RX ADMIN — SODIUM CHLORIDE 250 ML: 900 INJECTION, SOLUTION INTRAVENOUS at 08:44

## 2017-08-28 RX ADMIN — BEVACIZUMAB 370 MG: 400 INJECTION, SOLUTION INTRAVENOUS at 09:34

## 2017-08-28 RX ADMIN — DEXTROSE MONOHYDRATE 250 ML: 5 INJECTION, SOLUTION INTRAVENOUS at 10:09

## 2017-08-28 RX ADMIN — LEUCOVORIN CALCIUM 540 MG: 350 INJECTION, POWDER, LYOPHILIZED, FOR SOLUTION INTRAMUSCULAR; INTRAVENOUS at 10:09

## 2017-08-28 RX ADMIN — OXALIPLATIN 115 MG: 5 INJECTION, SOLUTION INTRAVENOUS at 10:09

## 2017-08-28 RX ADMIN — DEXAMETHASONE SODIUM PHOSPHATE 12 MG: 10 INJECTION INTRAMUSCULAR; INTRAVENOUS at 08:46

## 2017-08-28 RX ADMIN — FLUOROURACIL 2900 MG: 50 INJECTION, SOLUTION INTRAVENOUS at 12:06

## 2017-08-28 RX ADMIN — PALONOSETRON HYDROCHLORIDE 0.25 MG: 0.25 INJECTION INTRAVENOUS at 08:44

## 2017-08-30 ENCOUNTER — INFUSION (OUTPATIENT)
Dept: ONCOLOGY | Facility: HOSPITAL | Age: 57
End: 2017-08-30

## 2017-08-30 DIAGNOSIS — Z45.2 FITTING AND ADJUSTMENT OF VASCULAR CATHETER: Primary | ICD-10-CM

## 2017-08-30 DIAGNOSIS — C20 MALIGNANT NEOPLASM OF RECTUM (HCC): ICD-10-CM

## 2017-08-30 PROCEDURE — 25010000002 HEPARIN FLUSH (PORCINE) 100 UNIT/ML SOLUTION: Performed by: INTERNAL MEDICINE

## 2017-08-30 PROCEDURE — 96523 IRRIG DRUG DELIVERY DEVICE: CPT | Performed by: INTERNAL MEDICINE

## 2017-08-30 RX ORDER — SODIUM CHLORIDE 0.9 % (FLUSH) 0.9 %
10 SYRINGE (ML) INJECTION AS NEEDED
Status: CANCELLED | OUTPATIENT
Start: 2017-08-30

## 2017-08-30 RX ORDER — SODIUM CHLORIDE 0.9 % (FLUSH) 0.9 %
10 SYRINGE (ML) INJECTION AS NEEDED
Status: DISCONTINUED | OUTPATIENT
Start: 2017-08-30 | End: 2017-08-30 | Stop reason: HOSPADM

## 2017-08-30 RX ADMIN — Medication 10 ML: at 10:18

## 2017-08-30 RX ADMIN — SODIUM CHLORIDE, PRESERVATIVE FREE 500 UNITS: 5 INJECTION INTRAVENOUS at 10:19

## 2017-08-31 ENCOUNTER — INFUSION (OUTPATIENT)
Dept: ONCOLOGY | Facility: HOSPITAL | Age: 57
End: 2017-08-31

## 2017-08-31 DIAGNOSIS — C20 MALIGNANT NEOPLASM OF RECTUM (HCC): Primary | ICD-10-CM

## 2017-08-31 PROCEDURE — 25010000002 TBO-FILGRASTIM 480 MCG/0.8ML SOLUTION PREFILLED SYRINGE: Performed by: INTERNAL MEDICINE

## 2017-08-31 PROCEDURE — 96372 THER/PROPH/DIAG INJ SC/IM: CPT | Performed by: INTERNAL MEDICINE

## 2017-08-31 RX ADMIN — TBO-FILGRASTIM 480 MCG: 480 INJECTION, SOLUTION SUBCUTANEOUS at 11:23

## 2017-09-02 ENCOUNTER — INFUSION (OUTPATIENT)
Dept: ONCOLOGY | Facility: HOSPITAL | Age: 57
End: 2017-09-02

## 2017-09-02 VITALS
TEMPERATURE: 98.3 F | RESPIRATION RATE: 18 BRPM | HEART RATE: 93 BPM | DIASTOLIC BLOOD PRESSURE: 85 MMHG | SYSTOLIC BLOOD PRESSURE: 130 MMHG

## 2017-09-02 DIAGNOSIS — C20 MALIGNANT NEOPLASM OF RECTUM (HCC): Primary | ICD-10-CM

## 2017-09-02 PROCEDURE — 25010000002 TBO-FILGRASTIM 480 MCG/0.8ML SOLUTION PREFILLED SYRINGE: Performed by: INTERNAL MEDICINE

## 2017-09-02 PROCEDURE — 96372 THER/PROPH/DIAG INJ SC/IM: CPT

## 2017-09-02 RX ADMIN — TBO-FILGRASTIM 480 MCG: 480 INJECTION, SOLUTION SUBCUTANEOUS at 12:08

## 2017-09-03 ENCOUNTER — INFUSION (OUTPATIENT)
Dept: ONCOLOGY | Facility: HOSPITAL | Age: 57
End: 2017-09-03

## 2017-09-03 VITALS
RESPIRATION RATE: 20 BRPM | TEMPERATURE: 97.6 F | HEART RATE: 99 BPM | SYSTOLIC BLOOD PRESSURE: 142 MMHG | DIASTOLIC BLOOD PRESSURE: 83 MMHG | OXYGEN SATURATION: 97 %

## 2017-09-03 DIAGNOSIS — C20 MALIGNANT NEOPLASM OF RECTUM (HCC): Primary | ICD-10-CM

## 2017-09-03 PROCEDURE — 25010000002 TBO-FILGRASTIM 480 MCG/0.8ML SOLUTION PREFILLED SYRINGE: Performed by: INTERNAL MEDICINE

## 2017-09-03 PROCEDURE — 96372 THER/PROPH/DIAG INJ SC/IM: CPT

## 2017-09-03 RX ADMIN — TBO-FILGRASTIM 480 MCG: 480 INJECTION, SOLUTION SUBCUTANEOUS at 12:10

## 2017-09-11 ENCOUNTER — OFFICE VISIT (OUTPATIENT)
Dept: ONCOLOGY | Facility: CLINIC | Age: 57
End: 2017-09-11

## 2017-09-11 ENCOUNTER — INFUSION (OUTPATIENT)
Dept: ONCOLOGY | Facility: HOSPITAL | Age: 57
End: 2017-09-11

## 2017-09-11 VITALS — SYSTOLIC BLOOD PRESSURE: 116 MMHG | HEART RATE: 68 BPM | DIASTOLIC BLOOD PRESSURE: 74 MMHG

## 2017-09-11 VITALS
TEMPERATURE: 97.5 F | DIASTOLIC BLOOD PRESSURE: 76 MMHG | HEIGHT: 64 IN | WEIGHT: 168.4 LBS | BODY MASS INDEX: 28.75 KG/M2 | SYSTOLIC BLOOD PRESSURE: 108 MMHG | HEART RATE: 54 BPM | OXYGEN SATURATION: 98 % | RESPIRATION RATE: 18 BRPM

## 2017-09-11 DIAGNOSIS — C20 MALIGNANT NEOPLASM OF RECTUM (HCC): Primary | ICD-10-CM

## 2017-09-11 DIAGNOSIS — R53.0 NEOPLASTIC MALIGNANT RELATED FATIGUE: ICD-10-CM

## 2017-09-11 DIAGNOSIS — D70.8 OTHER NEUTROPENIA (HCC): ICD-10-CM

## 2017-09-11 DIAGNOSIS — C20 MALIGNANT NEOPLASM OF RECTUM (HCC): ICD-10-CM

## 2017-09-11 LAB
ALBUMIN SERPL-MCNC: 3.9 G/DL (ref 3.5–5.2)
ALBUMIN/GLOB SERPL: 1.2 G/DL (ref 1.1–2.4)
ALP SERPL-CCNC: 70 U/L (ref 38–116)
ALT SERPL W P-5'-P-CCNC: 25 U/L (ref 0–33)
ANION GAP SERPL CALCULATED.3IONS-SCNC: 14.9 MMOL/L
AST SERPL-CCNC: 35 U/L (ref 0–32)
BASOPHILS # BLD AUTO: 0.02 10*3/MM3 (ref 0–0.1)
BASOPHILS NFR BLD AUTO: 0.5 % (ref 0–1.1)
BILIRUB SERPL-MCNC: 0.5 MG/DL (ref 0.1–1.2)
BILIRUB UR QL STRIP: NEGATIVE
BUN BLD-MCNC: 13 MG/DL (ref 6–20)
BUN/CREAT SERPL: 18.3 (ref 7.3–30)
CALCIUM SPEC-SCNC: 9.1 MG/DL (ref 8.5–10.2)
CHLORIDE SERPL-SCNC: 103 MMOL/L (ref 98–107)
CLARITY UR: ABNORMAL
CO2 SERPL-SCNC: 24.1 MMOL/L (ref 22–29)
COLOR UR: YELLOW
CREAT BLD-MCNC: 0.71 MG/DL (ref 0.6–1.1)
DEPRECATED RDW RBC AUTO: 49 FL (ref 37–49)
EOSINOPHIL # BLD AUTO: 0.1 10*3/MM3 (ref 0–0.36)
EOSINOPHIL NFR BLD AUTO: 2.6 % (ref 1–5)
ERYTHROCYTE [DISTWIDTH] IN BLOOD BY AUTOMATED COUNT: 15.2 % (ref 11.7–14.5)
GFR SERPL CREATININE-BSD FRML MDRD: 85 ML/MIN/1.73
GLOBULIN UR ELPH-MCNC: 3.2 GM/DL (ref 1.8–3.5)
GLUCOSE BLD-MCNC: 110 MG/DL (ref 74–124)
GLUCOSE UR STRIP-MCNC: NEGATIVE MG/DL
HCT VFR BLD AUTO: 39.1 % (ref 34–45)
HGB BLD-MCNC: 13.1 G/DL (ref 11.5–14.9)
HGB UR QL STRIP.AUTO: ABNORMAL
IMM GRANULOCYTES # BLD: 0.06 10*3/MM3 (ref 0–0.03)
IMM GRANULOCYTES NFR BLD: 1.5 % (ref 0–0.5)
KETONES UR QL STRIP: NEGATIVE
LEUKOCYTE ESTERASE UR QL STRIP.AUTO: ABNORMAL
LYMPHOCYTES # BLD AUTO: 1.48 10*3/MM3 (ref 1–3.5)
LYMPHOCYTES NFR BLD AUTO: 38.1 % (ref 20–49)
MCH RBC QN AUTO: 29.8 PG (ref 27–33)
MCHC RBC AUTO-ENTMCNC: 33.5 G/DL (ref 32–35)
MCV RBC AUTO: 88.9 FL (ref 83–97)
MONOCYTES # BLD AUTO: 0.43 10*3/MM3 (ref 0.25–0.8)
MONOCYTES NFR BLD AUTO: 11.1 % (ref 4–12)
NEUTROPHILS # BLD AUTO: 1.79 10*3/MM3 (ref 1.5–7)
NEUTROPHILS NFR BLD AUTO: 46.2 % (ref 39–75)
NITRITE UR QL STRIP: NEGATIVE
NRBC BLD MANUAL-RTO: 0 /100 WBC (ref 0–0)
PH UR STRIP.AUTO: <=5 [PH] (ref 4.5–8)
PLATELET # BLD AUTO: 101 10*3/MM3 (ref 150–375)
PMV BLD AUTO: 9.2 FL (ref 8.9–12.1)
POTASSIUM BLD-SCNC: 4.3 MMOL/L (ref 3.5–4.7)
PROT SERPL-MCNC: 7.1 G/DL (ref 6.3–8)
PROT UR QL STRIP: NEGATIVE
RBC # BLD AUTO: 4.4 10*6/MM3 (ref 3.9–5)
SODIUM BLD-SCNC: 142 MMOL/L (ref 134–145)
SP GR UR STRIP: 1.02 (ref 1–1.03)
UROBILINOGEN UR QL STRIP: ABNORMAL
WBC NRBC COR # BLD: 3.88 10*3/MM3 (ref 4–10)

## 2017-09-11 PROCEDURE — 25010000002 LEUCOVORIN CALCIUM PER 50 MG: Performed by: INTERNAL MEDICINE

## 2017-09-11 PROCEDURE — 96413 CHEMO IV INFUSION 1 HR: CPT | Performed by: NURSE PRACTITIONER

## 2017-09-11 PROCEDURE — 25010000002 PALONOSETRON PER 25 MCG: Performed by: INTERNAL MEDICINE

## 2017-09-11 PROCEDURE — 80053 COMPREHEN METABOLIC PANEL: CPT | Performed by: INTERNAL MEDICINE

## 2017-09-11 PROCEDURE — 25010000002 OXALIPLATIN PER 0.5 MG: Performed by: INTERNAL MEDICINE

## 2017-09-11 PROCEDURE — 25010000002 BEVACIZUMAB PER 10 MG: Performed by: INTERNAL MEDICINE

## 2017-09-11 PROCEDURE — 96415 CHEMO IV INFUSION ADDL HR: CPT | Performed by: NURSE PRACTITIONER

## 2017-09-11 PROCEDURE — 25010000002 DEXAMETHASONE PER 1 MG: Performed by: INTERNAL MEDICINE

## 2017-09-11 PROCEDURE — 96416 CHEMO PROLONG INFUSE W/PUMP: CPT | Performed by: NURSE PRACTITIONER

## 2017-09-11 PROCEDURE — 99213 OFFICE O/P EST LOW 20 MIN: CPT | Performed by: NURSE PRACTITIONER

## 2017-09-11 PROCEDURE — 25010000002 FLUOROURACIL PER 500 MG: Performed by: INTERNAL MEDICINE

## 2017-09-11 PROCEDURE — 81003 URINALYSIS AUTO W/O SCOPE: CPT | Performed by: INTERNAL MEDICINE

## 2017-09-11 PROCEDURE — 36415 COLL VENOUS BLD VENIPUNCTURE: CPT | Performed by: INTERNAL MEDICINE

## 2017-09-11 PROCEDURE — 96417 CHEMO IV INFUS EACH ADDL SEQ: CPT | Performed by: NURSE PRACTITIONER

## 2017-09-11 PROCEDURE — 96375 TX/PRO/DX INJ NEW DRUG ADDON: CPT | Performed by: NURSE PRACTITIONER

## 2017-09-11 PROCEDURE — 85025 COMPLETE CBC W/AUTO DIFF WBC: CPT | Performed by: INTERNAL MEDICINE

## 2017-09-11 RX ORDER — DEXTROSE MONOHYDRATE 50 MG/ML
250 INJECTION, SOLUTION INTRAVENOUS ONCE
Status: COMPLETED | OUTPATIENT
Start: 2017-09-11 | End: 2017-09-11

## 2017-09-11 RX ORDER — SODIUM CHLORIDE 9 MG/ML
250 INJECTION, SOLUTION INTRAVENOUS ONCE
Status: COMPLETED | OUTPATIENT
Start: 2017-09-11 | End: 2017-09-11

## 2017-09-11 RX ORDER — PALONOSETRON 0.05 MG/ML
0.25 INJECTION, SOLUTION INTRAVENOUS ONCE
Status: COMPLETED | OUTPATIENT
Start: 2017-09-11 | End: 2017-09-11

## 2017-09-11 RX ADMIN — SODIUM CHLORIDE 250 ML: 900 INJECTION, SOLUTION INTRAVENOUS at 09:10

## 2017-09-11 RX ADMIN — DEXAMETHASONE SODIUM PHOSPHATE 12 MG: 10 INJECTION INTRAMUSCULAR; INTRAVENOUS at 09:11

## 2017-09-11 RX ADMIN — LEUCOVORIN CALCIUM 540 MG: 350 INJECTION, POWDER, LYOPHILIZED, FOR SOLUTION INTRAMUSCULAR; INTRAVENOUS at 10:20

## 2017-09-11 RX ADMIN — BEVACIZUMAB 370 MG: 400 INJECTION, SOLUTION INTRAVENOUS at 09:37

## 2017-09-11 RX ADMIN — PALONOSETRON HYDROCHLORIDE 0.25 MG: 0.25 INJECTION INTRAVENOUS at 09:10

## 2017-09-11 RX ADMIN — DEXTROSE MONOHYDRATE 250 ML: 5 INJECTION, SOLUTION INTRAVENOUS at 10:15

## 2017-09-11 RX ADMIN — FLUOROURACIL 2900 MG: 50 INJECTION, SOLUTION INTRAVENOUS at 12:33

## 2017-09-11 RX ADMIN — OXALIPLATIN 115 MG: 5 INJECTION, SOLUTION, CONCENTRATE INTRAVENOUS at 10:20

## 2017-09-11 NOTE — PROGRESS NOTES
[facility] GROUP OUTPATIENT FOLLOW UP CLINIC VISIT    REASON FOR FOLLOW-UP:    Malignant neoplasm of rectum    Staging form: Colon and Rectum, AJCC V7      Pathologic: Stage IIIC (T4b, N1b, cM0) - Signed by Jude Stewart MD on 12/9/2016        Staging comments: Suspected lung metastases.        Clinical: Stage TRUDY (T4b, N1b, M1a) - Signed by Jude Stewart MD on 12/19/2016  Kras mutation positive.  BRAF negative.  Microsatellite stable.      HISTORY OF PRESENT ILLNESS:  Benigno Quispe is a 57 y.o. female who returns for  cycle 8 FOLFOX.    She slightly nauseous this morning but otherwise denies concerns of issues with nausea, vomiting, or poor appetite.  She denies fevers.  She reports her main symptom is fatigue.  She denies issues with her bowels.        ONCOLOGIC HISTORY:     Malignant neoplasm of rectum    10/6/2016 Biopsy     Upper and lower endoscopy by Dr. Keller:  Rectal mass showing invasive moderately differentiated adenocarcinoma.           10/12/2016 Imaging     CT imaging showed bilateral pulmonary nodules with the largest in the left lower lobe measuring 11 mm, concerning for pulmonary metastases. A mass in the pelvis measured about 4 x 3 cm with a 15 mm lymph node in the right pelvis.         10/31/2016 Surgery     APR with posterior vaginectomy by Brie Clemente and Marisa Burris. Pathology showed a 5.5 cm low-grade well to moderately differentiated adenocarcinoma with 2 of 20 lymph nodes positive for metastatic disease and a positive radial margin.         10/31/2016 Imaging     PET scan:  Multiple bilateral hypermetabolic pulmonary nodules likely  represent metastases. The hypermetabolic nodes along both pelvic  sidewalls are suspected to be metastatic. The activity along the anal  canal and the activity within shotty bilateral groin nodes is  indeterminate.         2/13/2017 -  Chemotherapy     OP COLORECTAL FOLFIRI + Bevacizumab (Irinotecan / Leucovorin / Fluorouracil / Bevacizumab)      "      5/15/2017 Progression            5/23/2017 -  Chemotherapy     OP COLON [id number] + Bevacizumab (OXALIplatin / Leucovorin / Fluorouracil / Bevacizumab)              PAST MEDICAL, SURGICAL, FAMILY, AND SOCIAL HISTORIES were reviewed with the patient and in the electronic medical record, and were updated if indicated.    ALLERGIES:  Allergies   Allergen Reactions   • Adhesive Tape        MEDICATIONS:  The medication list has been reviewed with the patient by the medical assistant, and the list has been updated in the electronic medical record, which I reviewed.  Medication dosages and frequencies were confirmed to be accurate.    REVIEW OF SYSTEMS:  Review of Systems   Constitutional: Positive for fatigue. Negative for activity change, appetite change, chills, diaphoresis, fever and unexpected weight change.   Respiratory: Negative for cough, chest tightness and shortness of breath.    Cardiovascular: Negative for chest pain and leg swelling.   Gastrointestinal: Negative for abdominal pain, blood in stool, constipation, diarrhea, nausea and vomiting.   Endocrine: Negative.    Genitourinary: Negative.    Musculoskeletal: Negative for arthralgias, joint swelling and myalgias.   Allergic/Immunologic: Negative.    Neurological: Negative.    Hematological: Negative for adenopathy. Does not bruise/bleed easily.   Psychiatric/Behavioral: Negative for agitation, confusion and dysphoric mood. The patient is not nervous/anxious.      Objective     Vitals:    09/11/17 0827   BP: 108/76   Pulse: 54   Resp: 18   Temp: 97.5 °F (36.4 °C)   SpO2: 98%   Weight: 168 lb 6.4 oz (76.4 kg)   Height: 63.78\" (162 cm)   PainSc: 0-No pain     GENERAL:  Well-developed, well-nourished female in no acute distress.   SKIN:  Warm, dry without rashes, purpura or petechiae.  HEAD:  Normocephalic.  EYES:  EOMs intact.  Conjunctivae normal.  EARS:  Hearing intact.  NOSE:  Septum midline.  No excoriations or nasal discharge.  MOUTH:  Tongue is " well-papillated; no stomatitis or ulcers.  Lips normal.  CHEST:  Lungs clear to auscultation. Good airflow. mediport accessed  CARDIAC:  Regular rate and rhythm without murmurs, rubs or gallops. Normal S1,S2.  ABDOMEN:  Soft, nontender, no hepatosplenomegaly, normal bowel sounds  EXTREMITIES:  No clubbing, cyanosis or edema.  PSYCHIATRIC:  Normal affect and mood.         DIAGNOSTIC DATA:  Results for orders placed or performed in visit on 09/11/17   Comprehensive metabolic panel   Result Value Ref Range    Glucose 110 74 - 124 mg/dL    BUN 13 6 - 20 mg/dL    Creatinine 0.71 0.60 - 1.10 mg/dL    Sodium 142 134 - 145 mmol/L    Potassium 4.3 3.5 - 4.7 mmol/L    Chloride 103 98 - 107 mmol/L    CO2 24.1 22.0 - 29.0 mmol/L    Calcium 9.1 8.5 - 10.2 mg/dL    Total Protein 7.1 6.3 - 8.0 g/dL    Albumin 3.90 3.50 - 5.20 g/dL    ALT (SGPT) 25 0 - 33 U/L    AST (SGOT) 35 (H) 0 - 32 U/L    Alkaline Phosphatase 70 38 - 116 U/L    Total Bilirubin 0.5 0.1 - 1.2 mg/dL    eGFR Non African Amer 85 >60 mL/min/1.73    Globulin 3.2 1.8 - 3.5 gm/dL    A/G Ratio 1.2 1.1 - 2.4 g/dL    BUN/Creatinine Ratio 18.3 7.3 - 30.0    Anion Gap 14.9 mmol/L   Urinalysis   Result Value Ref Range    Color, UA Yellow Yellow, Straw    Appearance, UA Slightly Cloudy (A) Clear    pH, UA <=5.0 4.5 - 8.0    Specific Gravity, UA 1.020 1.002 - 1.030    Glucose, UA Negative Negative    Ketones, UA Negative Negative    Bilirubin, UA Negative Negative    Blood, UA Small (1+) (A) Negative    Protein, UA Negative Negative    Leuk Esterase, UA Moderate (2+) (A) Negative    Nitrite, UA Negative Negative    Urobilinogen, UA 0.2 E.U./dL 0.2 - 1.0 E.U./dL   CBC Auto Differential   Result Value Ref Range    WBC 3.88 (L) 4.00 - 10.00 10*3/mm3    RBC 4.40 3.90 - 5.00 10*6/mm3    Hemoglobin 13.1 11.5 - 14.9 g/dL    Hematocrit 39.1 34.0 - 45.0 %    MCV 88.9 83.0 - 97.0 fL    MCH 29.8 27.0 - 33.0 pg    MCHC 33.5 32.0 - 35.0 g/dL    RDW 15.2 (H) 11.7 - 14.5 %    RDW-SD 49.0  37.0 - 49.0 fl    MPV 9.2 8.9 - 12.1 fL    Platelets 101 (L) 150 - 375 10*3/mm3    Neutrophil % 46.2 39.0 - 75.0 %    Lymphocyte % 38.1 20.0 - 49.0 %    Monocyte % 11.1 4.0 - 12.0 %    Eosinophil % 2.6 1.0 - 5.0 %    Basophil % 0.5 0.0 - 1.1 %    Immature Grans % 1.5 (H) 0.0 - 0.5 %    Neutrophils, Absolute 1.79 1.50 - 7.00 10*3/mm3    Lymphocytes, Absolute 1.48 1.00 - 3.50 10*3/mm3    Monocytes, Absolute 0.43 0.25 - 0.80 10*3/mm3    Eosinophils, Absolute 0.10 0.00 - 0.36 10*3/mm3    Basophils, Absolute 0.02 0.00 - 0.10 10*3/mm3    Immature Grans, Absolute 0.06 (H) 0.00 - 0.03 10*3/mm3    nRBC 0.0 0.0 - 0.0 /100 WBC         No results found.    Assessment/Plan   ASSESSMENT/PLAN:  This is a 57 y.o. female with:  1.  Metastatic rectal cancer, Kras mutated: She had a resection on 10/31/2016 by Brie Clemente and [place].  Unfortunately, PET scan shows evidence for local lydia metastases as well as bilateral pulmonary metastases.    - Palliative FOLFIRI/Avastin started 2/10/17. Multiple dose reductions due to cytopenias. Stopped 5/2017 due to disease progression   - FOLFOX with Avastin initiated 5/23/2017 with initial dose reduction of 25% across all agents and Granix. 5-FU further dose reduced on 8/14/17.     - Proceed with cycle 6 FOLFOX/Avastin with Granix days 4-7.    - Recent scans in late July 2017 show essentially stability.  There is very mild growth in the lung however is very small on terms of 2 mm, thus I don't consider it enough growth to change her treatment.     - We further dose reduce the 5-FU due to neutropenia.  She will be receiving Granix days 4 through 7.     -The patient has only received 3 days of Granix the past couple of times due to adherence to the schedule.  She is requesting today to do 3 days once again.  We discussed that this could risk her white count being too low and has not able to proceed with treatment.  She is willing to take that risk.  She does want Granix on Sunday.  We will adjust  her schedule.  We did discuss however that she must adhere to the 3 days.   -Follow-up with Dr. Paez in 2 weeks.    2. Vitamin B12 deficiency. B12 injections in the past and currently on 2000 mcg oral B12 for time being.  Most recent level greater than 2000.    3. Neutropenia before intiation of chemotherapy. Likely secondary to B12 deficiency and chemo.    4. Cancer related pain. Norco 10 mg every 6 hrs prn.     5. Anxiety. Celexa 20 mg daily.     6. Insomnia. Klonopin 1mg PRN.      Kaitlin Dickey, APRN

## 2017-09-13 ENCOUNTER — INFUSION (OUTPATIENT)
Dept: ONCOLOGY | Facility: HOSPITAL | Age: 57
End: 2017-09-13

## 2017-09-13 DIAGNOSIS — C20 MALIGNANT NEOPLASM OF RECTUM (HCC): ICD-10-CM

## 2017-09-13 DIAGNOSIS — Z45.2 FITTING AND ADJUSTMENT OF VASCULAR CATHETER: Primary | ICD-10-CM

## 2017-09-13 PROCEDURE — 96523 IRRIG DRUG DELIVERY DEVICE: CPT | Performed by: NURSE PRACTITIONER

## 2017-09-13 PROCEDURE — 25010000002 HEPARIN FLUSH (PORCINE) 100 UNIT/ML SOLUTION: Performed by: NURSE PRACTITIONER

## 2017-09-13 RX ORDER — SODIUM CHLORIDE 0.9 % (FLUSH) 0.9 %
10 SYRINGE (ML) INJECTION AS NEEDED
Status: DISCONTINUED | OUTPATIENT
Start: 2017-09-13 | End: 2017-09-13 | Stop reason: HOSPADM

## 2017-09-13 RX ORDER — SODIUM CHLORIDE 0.9 % (FLUSH) 0.9 %
10 SYRINGE (ML) INJECTION AS NEEDED
Status: CANCELLED | OUTPATIENT
Start: 2017-09-13

## 2017-09-13 RX ADMIN — SODIUM CHLORIDE, PRESERVATIVE FREE 500 UNITS: 5 INJECTION INTRAVENOUS at 10:29

## 2017-09-13 RX ADMIN — Medication 10 ML: at 10:28

## 2017-09-14 ENCOUNTER — INFUSION (OUTPATIENT)
Dept: ONCOLOGY | Facility: HOSPITAL | Age: 57
End: 2017-09-14

## 2017-09-14 VITALS — WEIGHT: 168.4 LBS | BODY MASS INDEX: 29.11 KG/M2

## 2017-09-14 DIAGNOSIS — C20 MALIGNANT NEOPLASM OF RECTUM (HCC): Primary | ICD-10-CM

## 2017-09-14 PROCEDURE — 96372 THER/PROPH/DIAG INJ SC/IM: CPT | Performed by: INTERNAL MEDICINE

## 2017-09-14 PROCEDURE — 25010000002 TBO-FILGRASTIM 480 MCG/0.8ML SOLUTION PREFILLED SYRINGE: Performed by: INTERNAL MEDICINE

## 2017-09-14 RX ADMIN — TBO-FILGRASTIM 480 MCG: 480 INJECTION, SOLUTION SUBCUTANEOUS at 11:31

## 2017-09-15 ENCOUNTER — INFUSION (OUTPATIENT)
Dept: ONCOLOGY | Facility: HOSPITAL | Age: 57
End: 2017-09-15

## 2017-09-15 VITALS — TEMPERATURE: 97.9 F

## 2017-09-15 DIAGNOSIS — C20 MALIGNANT NEOPLASM OF RECTUM (HCC): Primary | ICD-10-CM

## 2017-09-15 PROCEDURE — 96372 THER/PROPH/DIAG INJ SC/IM: CPT | Performed by: NURSE PRACTITIONER

## 2017-09-15 PROCEDURE — 25010000002 TBO-FILGRASTIM 480 MCG/0.8ML SOLUTION PREFILLED SYRINGE: Performed by: NURSE PRACTITIONER

## 2017-09-15 RX ADMIN — TBO-FILGRASTIM 480 MCG: 480 INJECTION, SOLUTION SUBCUTANEOUS at 11:15

## 2017-09-15 NOTE — PROGRESS NOTES
Pt c/o of RUQ pain. She states that this isn't new. She states that her pain medication helps (Hydrocodone). Doesn't want anything stronger. Also, states that she had a low grade fever last night of 99.0 She took Tylenol and hasn't had anything since. Check temp while in office today 97.9 Orally. Informed pt to call if pain gets worse or if fever of 100.4 or greater. Pt and  V/U.

## 2017-09-16 ENCOUNTER — INFUSION (OUTPATIENT)
Dept: ONCOLOGY | Facility: HOSPITAL | Age: 57
End: 2017-09-16

## 2017-09-16 VITALS
HEART RATE: 92 BPM | TEMPERATURE: 98.5 F | RESPIRATION RATE: 20 BRPM | SYSTOLIC BLOOD PRESSURE: 124 MMHG | OXYGEN SATURATION: 97 % | DIASTOLIC BLOOD PRESSURE: 76 MMHG

## 2017-09-16 DIAGNOSIS — C20 MALIGNANT NEOPLASM OF RECTUM (HCC): Primary | ICD-10-CM

## 2017-09-16 PROCEDURE — 96372 THER/PROPH/DIAG INJ SC/IM: CPT

## 2017-09-16 PROCEDURE — 25010000002 TBO-FILGRASTIM 480 MCG/0.8ML SOLUTION PREFILLED SYRINGE: Performed by: INTERNAL MEDICINE

## 2017-09-16 RX ADMIN — TBO-FILGRASTIM 480 MCG: 480 INJECTION, SOLUTION SUBCUTANEOUS at 12:11

## 2017-09-21 DIAGNOSIS — C20 MALIGNANT NEOPLASM OF RECTUM (HCC): ICD-10-CM

## 2017-09-25 ENCOUNTER — OFFICE VISIT (OUTPATIENT)
Dept: ONCOLOGY | Facility: CLINIC | Age: 57
End: 2017-09-25

## 2017-09-25 ENCOUNTER — INFUSION (OUTPATIENT)
Dept: ONCOLOGY | Facility: HOSPITAL | Age: 57
End: 2017-09-25

## 2017-09-25 VITALS
TEMPERATURE: 97.6 F | DIASTOLIC BLOOD PRESSURE: 84 MMHG | BODY MASS INDEX: 28.44 KG/M2 | HEART RATE: 82 BPM | RESPIRATION RATE: 18 BRPM | SYSTOLIC BLOOD PRESSURE: 138 MMHG | OXYGEN SATURATION: 97 % | HEIGHT: 64 IN | WEIGHT: 166.6 LBS

## 2017-09-25 DIAGNOSIS — C20 MALIGNANT NEOPLASM OF RECTUM (HCC): Primary | ICD-10-CM

## 2017-09-25 DIAGNOSIS — R53.0 NEOPLASTIC MALIGNANT RELATED FATIGUE: ICD-10-CM

## 2017-09-25 DIAGNOSIS — R10.11 RIGHT UPPER QUADRANT ABDOMINAL PAIN: ICD-10-CM

## 2017-09-25 DIAGNOSIS — Z45.2 FITTING AND ADJUSTMENT OF VASCULAR CATHETER: ICD-10-CM

## 2017-09-25 DIAGNOSIS — C20 MALIGNANT NEOPLASM OF RECTUM (HCC): ICD-10-CM

## 2017-09-25 LAB
ALBUMIN SERPL-MCNC: 4.2 G/DL (ref 3.5–5.2)
ALBUMIN/GLOB SERPL: 1.2 G/DL (ref 1.1–2.4)
ALP SERPL-CCNC: 78 U/L (ref 38–116)
ALT SERPL W P-5'-P-CCNC: 25 U/L (ref 0–33)
ANION GAP SERPL CALCULATED.3IONS-SCNC: 13.2 MMOL/L
AST SERPL-CCNC: 37 U/L (ref 0–32)
BASOPHILS # BLD AUTO: 0.02 10*3/MM3 (ref 0–0.1)
BASOPHILS NFR BLD AUTO: 0.6 % (ref 0–1.1)
BILIRUB SERPL-MCNC: 0.6 MG/DL (ref 0.1–1.2)
BILIRUB UR QL STRIP: NEGATIVE
BUN BLD-MCNC: 13 MG/DL (ref 6–20)
BUN/CREAT SERPL: 17.6 (ref 7.3–30)
CALCIUM SPEC-SCNC: 9.6 MG/DL (ref 8.5–10.2)
CHLORIDE SERPL-SCNC: 101 MMOL/L (ref 98–107)
CLARITY UR: CLEAR
CO2 SERPL-SCNC: 26.8 MMOL/L (ref 22–29)
COLOR UR: YELLOW
CREAT BLD-MCNC: 0.74 MG/DL (ref 0.6–1.1)
DEPRECATED RDW RBC AUTO: 48.2 FL (ref 37–49)
EOSINOPHIL # BLD AUTO: 0.09 10*3/MM3 (ref 0–0.36)
EOSINOPHIL NFR BLD AUTO: 2.7 % (ref 1–5)
ERYTHROCYTE [DISTWIDTH] IN BLOOD BY AUTOMATED COUNT: 15.3 % (ref 11.7–14.5)
GFR SERPL CREATININE-BSD FRML MDRD: 81 ML/MIN/1.73
GLOBULIN UR ELPH-MCNC: 3.4 GM/DL (ref 1.8–3.5)
GLUCOSE BLD-MCNC: 125 MG/DL (ref 74–124)
GLUCOSE UR STRIP-MCNC: NEGATIVE MG/DL
HCT VFR BLD AUTO: 42.4 % (ref 34–45)
HGB BLD-MCNC: 14.2 G/DL (ref 11.5–14.9)
HGB UR QL STRIP.AUTO: ABNORMAL
HOLD SPECIMEN: NORMAL
IMM GRANULOCYTES # BLD: 0.01 10*3/MM3 (ref 0–0.03)
IMM GRANULOCYTES NFR BLD: 0.3 % (ref 0–0.5)
KETONES UR QL STRIP: NEGATIVE
LEUKOCYTE ESTERASE UR QL STRIP.AUTO: ABNORMAL
LYMPHOCYTES # BLD AUTO: 1.69 10*3/MM3 (ref 1–3.5)
LYMPHOCYTES NFR BLD AUTO: 50.3 % (ref 20–49)
MCH RBC QN AUTO: 29.4 PG (ref 27–33)
MCHC RBC AUTO-ENTMCNC: 33.5 G/DL (ref 32–35)
MCV RBC AUTO: 87.8 FL (ref 83–97)
MONOCYTES # BLD AUTO: 0.41 10*3/MM3 (ref 0.25–0.8)
MONOCYTES NFR BLD AUTO: 12.2 % (ref 4–12)
NEUTROPHILS # BLD AUTO: 1.14 10*3/MM3 (ref 1.5–7)
NEUTROPHILS NFR BLD AUTO: 33.9 % (ref 39–75)
NITRITE UR QL STRIP: NEGATIVE
NRBC BLD MANUAL-RTO: 0 /100 WBC (ref 0–0)
PH UR STRIP.AUTO: 5.5 [PH] (ref 4.5–8)
PLATELET # BLD AUTO: 118 10*3/MM3 (ref 150–375)
PMV BLD AUTO: 9.1 FL (ref 8.9–12.1)
POTASSIUM BLD-SCNC: 4 MMOL/L (ref 3.5–4.7)
PROT SERPL-MCNC: 7.6 G/DL (ref 6.3–8)
PROT UR QL STRIP: ABNORMAL
RBC # BLD AUTO: 4.83 10*6/MM3 (ref 3.9–5)
SODIUM BLD-SCNC: 141 MMOL/L (ref 134–145)
SP GR UR STRIP: 1.02 (ref 1–1.03)
UROBILINOGEN UR QL STRIP: ABNORMAL
WBC NRBC COR # BLD: 3.36 10*3/MM3 (ref 4–10)

## 2017-09-25 PROCEDURE — 85025 COMPLETE CBC W/AUTO DIFF WBC: CPT

## 2017-09-25 PROCEDURE — 99214 OFFICE O/P EST MOD 30 MIN: CPT | Performed by: INTERNAL MEDICINE

## 2017-09-25 PROCEDURE — 96523 IRRIG DRUG DELIVERY DEVICE: CPT | Performed by: INTERNAL MEDICINE

## 2017-09-25 PROCEDURE — 81003 URINALYSIS AUTO W/O SCOPE: CPT

## 2017-09-25 PROCEDURE — 25010000002 HEPARIN FLUSH (PORCINE) 100 UNIT/ML SOLUTION: Performed by: INTERNAL MEDICINE

## 2017-09-25 PROCEDURE — 80053 COMPREHEN METABOLIC PANEL: CPT

## 2017-09-25 RX ORDER — OMEPRAZOLE 40 MG/1
40 CAPSULE, DELAYED RELEASE ORAL DAILY
Qty: 60 CAPSULE | Refills: 1 | Status: SHIPPED | OUTPATIENT
Start: 2017-09-25 | End: 2018-01-31

## 2017-09-25 RX ORDER — HYDROCODONE BITARTRATE AND ACETAMINOPHEN 10; 325 MG/1; MG/1
1 TABLET ORAL EVERY 6 HOURS PRN
Qty: 120 TABLET | Refills: 0 | Status: SHIPPED | OUTPATIENT
Start: 2017-09-25 | End: 2017-10-30 | Stop reason: SDUPTHER

## 2017-09-25 RX ORDER — SODIUM CHLORIDE 0.9 % (FLUSH) 0.9 %
10 SYRINGE (ML) INJECTION AS NEEDED
Status: CANCELLED | OUTPATIENT
Start: 2017-09-25

## 2017-09-25 RX ORDER — ONDANSETRON HYDROCHLORIDE 8 MG/1
8 TABLET, FILM COATED ORAL EVERY 8 HOURS PRN
Qty: 30 TABLET | Refills: 5 | Status: SHIPPED | OUTPATIENT
Start: 2017-09-25 | End: 2018-01-31

## 2017-09-25 RX ORDER — CLONAZEPAM 1 MG/1
1 TABLET ORAL 2 TIMES DAILY PRN
Qty: 60 TABLET | Refills: 0 | Status: SHIPPED | OUTPATIENT
Start: 2017-09-25 | End: 2017-10-30 | Stop reason: SDUPTHER

## 2017-09-25 RX ORDER — SODIUM CHLORIDE 0.9 % (FLUSH) 0.9 %
10 SYRINGE (ML) INJECTION AS NEEDED
Status: ACTIVE | OUTPATIENT
Start: 2017-09-25

## 2017-09-25 RX ADMIN — Medication 10 ML: at 11:00

## 2017-09-25 RX ADMIN — SODIUM CHLORIDE, PRESERVATIVE FREE 500 UNITS: 5 INJECTION INTRAVENOUS at 11:01

## 2017-09-25 NOTE — PROGRESS NOTES
[facility] GROUP OUTPATIENT FOLLOW UP CLINIC VISIT    REASON FOR FOLLOW-UP:    Malignant neoplasm of rectum    Staging form: Colon and Rectum, AJCC V7      Pathologic: Stage IIIC (T4b, N1b, cM0) - Signed by Jude Stewart MD on 12/9/2016        Staging comments: Suspected lung metastases.        Clinical: Stage TRUDY (T4b, N1b, M1a) - Signed by Jude Stewart MD on 12/19/2016  Kras mutation positive.  BRAF negative.  Microsatellite stable.      HISTORY OF PRESENT ILLNESS:  Benigno Quispe is a 57 y.o. female who returns for cycle 9 FOLFOX.  She is not feeling well.  She has some right upper quadrant abdominal pain.  She's been afebrile and has had nausea but no emesis or diarrhea.  She is passing stool in her ostomy.  She notes that she didn't feel well since her last chemotherapy.  She normally has one good week and she has not had that recently.  She did take some Prilosec and it did help the pain some.  She has been out of her nausea medicine but previously Zofran worked well.  She has not been around sick contacts.  She has had chills and diaphoresis. No dysuria. Pain not made worse with food.      ONCOLOGIC HISTORY:     Malignant neoplasm of rectum    10/6/2016 Biopsy     Upper and lower endoscopy by Dr. Keller:  Rectal mass showing invasive moderately differentiated adenocarcinoma.           10/12/2016 Imaging     CT imaging showed bilateral pulmonary nodules with the largest in the left lower lobe measuring 11 mm, concerning for pulmonary metastases. A mass in the pelvis measured about 4 x 3 cm with a 15 mm lymph node in the right pelvis.         10/31/2016 Surgery     APR with posterior vaginectomy by Brie Clemente and Marisa Burris. Pathology showed a 5.5 cm low-grade well to moderately differentiated adenocarcinoma with 2 of 20 lymph nodes positive for metastatic disease and a positive radial margin.         10/31/2016 Imaging     PET scan:  Multiple bilateral hypermetabolic pulmonary nodules  likely  represent metastases. The hypermetabolic nodes along both pelvic  sidewalls are suspected to be metastatic. The activity along the anal  canal and the activity within shotty bilateral groin nodes is  indeterminate.         2/13/2017 -  Chemotherapy     OP COLORECTAL FOLFIRI + Bevacizumab (Irinotecan / Leucovorin / Fluorouracil / Bevacizumab)           5/15/2017 Progression            5/23/2017 -  Chemotherapy     OP COLON mFOLFOX6 + Bevacizumab (OXALIplatin / Leucovorin / Fluorouracil / Bevacizumab)              PAST MEDICAL, SURGICAL, FAMILY, AND SOCIAL HISTORIES were reviewed with the patient and in the electronic medical record, and were updated if indicated.    ALLERGIES:  Allergies   Allergen Reactions   • Adhesive Tape        MEDICATIONS:  The medication list has been reviewed with the patient by the medical assistant, and the list has been updated in the electronic medical record, which I reviewed.  Medication dosages and frequencies were confirmed to be accurate.    REVIEW OF SYSTEMS:  Review of Systems   Constitutional: Positive for appetite change, chills, diaphoresis and fatigue. Negative for activity change, fever and unexpected weight change.   Respiratory: Negative for cough, chest tightness and shortness of breath.    Cardiovascular: Negative for chest pain and leg swelling.   Gastrointestinal: Positive for abdominal pain and nausea. Negative for abdominal distention, blood in stool, constipation, diarrhea, rectal pain and vomiting.   Endocrine: Negative.    Genitourinary: Negative.    Musculoskeletal: Negative for arthralgias, joint swelling and myalgias.   Allergic/Immunologic: Negative.    Neurological: Positive for light-headedness.   Hematological: Negative for adenopathy. Does not bruise/bleed easily.   Psychiatric/Behavioral: Negative for agitation, confusion and dysphoric mood. The patient is not nervous/anxious.      Objective   There were no vitals filed for this visit.  Vitals  reviewed  GENERAL:  Well-developed, well-nourished female appears uncomfortable/nauseous  SKIN:  Warm, dry without rashes, purpura or petechiae.  HEAD:  Normocephalic.  EYES:  EOMs intact.  Conjunctivae normal.  EARS:  Hearing intact.  MOUTH:  Tongue is well-papillated; no stomatitis or ulcers.  Lips normal.  THROAT:  Oropharynx without lesions or exudates.  CHEST:  Lungs clear to percussion and auscultation. Good airflow. Mediport in place, benign appearing.  CARDIAC:  Regular rate and rhythm without murmurs, rubs or gallops. Normal S1,S2.  ABDOMEN:  Soft, tender right upper quadrant/midepigastric area, ostomy present with stool, normal bowel sounds  EXTREMITIES:  No clubbing, cyanosis or edema.  PSYCHIATRIC:  Flat affect and mood.      DIAGNOSTIC DATA:  Results for orders placed or performed in visit on 09/25/17   Urinalysis   Result Value Ref Range    Color, UA Yellow Yellow, Straw    Appearance, UA Clear Clear    pH, UA 5.5 4.5 - 8.0    Specific Gravity, UA 1.025 1.002 - 1.030    Glucose, UA Negative Negative    Ketones, UA Negative Negative    Bilirubin, UA Negative Negative    Blood, UA Moderate (2+) (A) Negative    Protein, UA 30 mg/dL (1+) (A) Negative    Leuk Esterase, UA Moderate (2+) (A) Negative    Nitrite, UA Negative Negative    Urobilinogen, UA 0.2 E.U./dL 0.2 - 1.0 E.U./dL   CBC Auto Differential   Result Value Ref Range    WBC 3.36 (L) 4.00 - 10.00 10*3/mm3    RBC 4.83 3.90 - 5.00 10*6/mm3    Hemoglobin 14.2 11.5 - 14.9 g/dL    Hematocrit 42.4 34.0 - 45.0 %    MCV 87.8 83.0 - 97.0 fL    MCH 29.4 27.0 - 33.0 pg    MCHC 33.5 32.0 - 35.0 g/dL    RDW 15.3 (H) 11.7 - 14.5 %    RDW-SD 48.2 37.0 - 49.0 fl    MPV 9.1 8.9 - 12.1 fL    Platelets 118 (L) 150 - 375 10*3/mm3    Neutrophil % 33.9 (L) 39.0 - 75.0 %    Lymphocyte % 50.3 (H) 20.0 - 49.0 %    Monocyte % 12.2 (H) 4.0 - 12.0 %    Eosinophil % 2.7 1.0 - 5.0 %    Basophil % 0.6 0.0 - 1.1 %    Immature Grans % 0.3 0.0 - 0.5 %    Neutrophils, Absolute 1.14  (L) 1.50 - 7.00 10*3/mm3    Lymphocytes, Absolute 1.69 1.00 - 3.50 10*3/mm3    Monocytes, Absolute 0.41 0.25 - 0.80 10*3/mm3    Eosinophils, Absolute 0.09 0.00 - 0.36 10*3/mm3    Basophils, Absolute 0.02 0.00 - 0.10 10*3/mm3    Immature Grans, Absolute 0.01 0.00 - 0.03 10*3/mm3    nRBC 0.0 0.0 - 0.0 /100 WBC         No results found.    Assessment/Plan   ASSESSMENT/PLAN:  This is a 57 y.o. female with:  1.  Metastatic rectal cancer, Kras mutated: She had a resection on 10/31/2016 by Brie Clemente and [place].  Unfortunately, PET scan shows evidence for local lydia metastases as well as bilateral pulmonary metastases.    - Palliative FOLFIRI/Avastin started 2/10/17. Multiple dose reductions due to cytopenias. Stopped 5/2017 due to disease progression   - FOLFOX with Avastin initiated 5/23/2017 with initial dose reduction of 25% across all agents and Granix. 5-FU further dose reduced on 8/14/17.     - Proceed with cycle 6 FOLFOX/Avastin with Granix days 4-7.    - Recent scans in late July 2017 show essentially stability.  There is very mild growth in the lung however is very small on terms of 2 mm, thus I don't consider it enough growth to change her treatment.     - We further dose reduced the 5-FU due to neutropenia.  She will be receiving Granix days 4 through 6 because she does not want to come in on Sundays.     - Holding chemotherapy today due to generalized illness. Reimaging.     2. Vitamin B12 deficiency. B12 injections in the past and currently on 2000 mcg oral B12 for time being.  Most recent level greater than 2000.    3. Neutropenia before intiation of chemotherapy. Likely secondary to B12 deficiency and chemo.    4. Cancer related pain. Norco 10 mg every 6 hrs prn - refilled.     5. Anxiety. Celexa 20 mg daily.     6. Insomnia. Klonopin 1mg PRN - refilled.     7. Abdominal pain, nausea, chills. I suspect that she has a viral illness, but I'd like to hold treatment and obtain imaging studies. Her urine has  ELIZA, but I do not suspect UTI. I offered IVFs today but she declined. Will obtain CT scans, refill Zofran and prilosec. Call if symptoms progress. OK to use tylenol.     Pilar Paez MD

## 2017-09-27 ENCOUNTER — HOSPITAL ENCOUNTER (OUTPATIENT)
Dept: PET IMAGING | Facility: HOSPITAL | Age: 57
Discharge: HOME | End: 2017-09-27
Attending: INTERNAL MEDICINE | Admitting: INTERNAL MEDICINE

## 2017-09-27 DIAGNOSIS — R10.11 RIGHT UPPER QUADRANT ABDOMINAL PAIN: ICD-10-CM

## 2017-09-27 DIAGNOSIS — C20 MALIGNANT NEOPLASM OF RECTUM (HCC): ICD-10-CM

## 2017-09-27 LAB — CREAT BLDA-MCNC: 0.7 MG/DL (ref 0.6–1.3)

## 2017-09-27 PROCEDURE — 74177 CT ABD & PELVIS W/CONTRAST: CPT

## 2017-09-27 PROCEDURE — 82565 ASSAY OF CREATININE: CPT

## 2017-09-27 PROCEDURE — 0 DIATRIZOATE MEGLUMINE & SODIUM PER 1 ML: Performed by: INTERNAL MEDICINE

## 2017-09-27 PROCEDURE — 71260 CT THORAX DX C+: CPT

## 2017-09-27 PROCEDURE — 0 IOPAMIDOL 61 % SOLUTION: Performed by: INTERNAL MEDICINE

## 2017-09-27 RX ADMIN — DIATRIZOATE MEGLUMINE AND DIATRIZOATE SODIUM 30 ML: 660; 100 LIQUID ORAL; RECTAL at 09:05

## 2017-09-27 RX ADMIN — IOPAMIDOL 85 ML: 612 INJECTION, SOLUTION INTRAVENOUS at 10:00

## 2017-09-28 ENCOUNTER — APPOINTMENT (OUTPATIENT)
Dept: ONCOLOGY | Facility: HOSPITAL | Age: 57
End: 2017-09-28

## 2017-09-29 ENCOUNTER — APPOINTMENT (OUTPATIENT)
Dept: ONCOLOGY | Facility: HOSPITAL | Age: 57
End: 2017-09-29

## 2017-09-30 ENCOUNTER — APPOINTMENT (OUTPATIENT)
Dept: ONCOLOGY | Facility: HOSPITAL | Age: 57
End: 2017-09-30

## 2017-10-02 ENCOUNTER — INFUSION (OUTPATIENT)
Dept: ONCOLOGY | Facility: HOSPITAL | Age: 57
End: 2017-10-02

## 2017-10-02 ENCOUNTER — OFFICE VISIT (OUTPATIENT)
Dept: ONCOLOGY | Facility: CLINIC | Age: 57
End: 2017-10-02

## 2017-10-02 VITALS
TEMPERATURE: 98.2 F | BODY MASS INDEX: 28.85 KG/M2 | HEIGHT: 64 IN | SYSTOLIC BLOOD PRESSURE: 122 MMHG | WEIGHT: 169 LBS | RESPIRATION RATE: 16 BRPM | DIASTOLIC BLOOD PRESSURE: 80 MMHG | HEART RATE: 72 BPM

## 2017-10-02 DIAGNOSIS — C20 MALIGNANT NEOPLASM OF RECTUM (HCC): Primary | ICD-10-CM

## 2017-10-02 DIAGNOSIS — C20 MALIGNANT NEOPLASM OF RECTUM (HCC): ICD-10-CM

## 2017-10-02 LAB
ALBUMIN SERPL-MCNC: 4 G/DL (ref 3.5–5.2)
ALBUMIN/GLOB SERPL: 1.3 G/DL (ref 1.1–2.4)
ALP SERPL-CCNC: 80 U/L (ref 38–116)
ALT SERPL W P-5'-P-CCNC: 21 U/L (ref 0–33)
ANION GAP SERPL CALCULATED.3IONS-SCNC: 11.6 MMOL/L
AST SERPL-CCNC: 33 U/L (ref 0–32)
BASOPHILS # BLD AUTO: 0.04 10*3/MM3 (ref 0–0.1)
BASOPHILS NFR BLD AUTO: 0.9 % (ref 0–1.1)
BILIRUB SERPL-MCNC: 0.5 MG/DL (ref 0.1–1.2)
BILIRUB UR QL STRIP: NEGATIVE
BUN BLD-MCNC: 16 MG/DL (ref 6–20)
BUN/CREAT SERPL: 18.8 (ref 7.3–30)
CALCIUM SPEC-SCNC: 9.5 MG/DL (ref 8.5–10.2)
CHLORIDE SERPL-SCNC: 103 MMOL/L (ref 98–107)
CLARITY UR: CLEAR
CO2 SERPL-SCNC: 27.4 MMOL/L (ref 22–29)
COLOR UR: YELLOW
CREAT BLD-MCNC: 0.85 MG/DL (ref 0.6–1.1)
DEPRECATED RDW RBC AUTO: 47.6 FL (ref 37–49)
EOSINOPHIL # BLD AUTO: 0.09 10*3/MM3 (ref 0–0.36)
EOSINOPHIL NFR BLD AUTO: 2 % (ref 1–5)
ERYTHROCYTE [DISTWIDTH] IN BLOOD BY AUTOMATED COUNT: 14.6 % (ref 11.7–14.5)
GFR SERPL CREATININE-BSD FRML MDRD: 69 ML/MIN/1.73
GLOBULIN UR ELPH-MCNC: 3.2 GM/DL (ref 1.8–3.5)
GLUCOSE BLD-MCNC: 102 MG/DL (ref 74–124)
GLUCOSE UR STRIP-MCNC: NEGATIVE MG/DL
HCT VFR BLD AUTO: 40 % (ref 34–45)
HGB BLD-MCNC: 13.1 G/DL (ref 11.5–14.9)
HGB UR QL STRIP.AUTO: ABNORMAL
IMM GRANULOCYTES # BLD: 0.02 10*3/MM3 (ref 0–0.03)
IMM GRANULOCYTES NFR BLD: 0.4 % (ref 0–0.5)
KETONES UR QL STRIP: NEGATIVE
LEUKOCYTE ESTERASE UR QL STRIP.AUTO: ABNORMAL
LYMPHOCYTES # BLD AUTO: 1.55 10*3/MM3 (ref 1–3.5)
LYMPHOCYTES NFR BLD AUTO: 34.1 % (ref 20–49)
MCH RBC QN AUTO: 29.2 PG (ref 27–33)
MCHC RBC AUTO-ENTMCNC: 32.8 G/DL (ref 32–35)
MCV RBC AUTO: 89.1 FL (ref 83–97)
MONOCYTES # BLD AUTO: 0.55 10*3/MM3 (ref 0.25–0.8)
MONOCYTES NFR BLD AUTO: 12.1 % (ref 4–12)
NEUTROPHILS # BLD AUTO: 2.3 10*3/MM3 (ref 1.5–7)
NEUTROPHILS NFR BLD AUTO: 50.5 % (ref 39–75)
NITRITE UR QL STRIP: NEGATIVE
NRBC BLD MANUAL-RTO: 0 /100 WBC (ref 0–0)
PH UR STRIP.AUTO: 6 [PH] (ref 4.5–8)
PLATELET # BLD AUTO: 143 10*3/MM3 (ref 150–375)
PMV BLD AUTO: 9.3 FL (ref 8.9–12.1)
POTASSIUM BLD-SCNC: 4.3 MMOL/L (ref 3.5–4.7)
PROT SERPL-MCNC: 7.2 G/DL (ref 6.3–8)
PROT UR QL STRIP: NEGATIVE
RBC # BLD AUTO: 4.49 10*6/MM3 (ref 3.9–5)
SODIUM BLD-SCNC: 142 MMOL/L (ref 134–145)
SP GR UR STRIP: 1.01 (ref 1–1.03)
UROBILINOGEN UR QL STRIP: ABNORMAL
WBC NRBC COR # BLD: 4.55 10*3/MM3 (ref 4–10)

## 2017-10-02 PROCEDURE — 96417 CHEMO IV INFUS EACH ADDL SEQ: CPT | Performed by: INTERNAL MEDICINE

## 2017-10-02 PROCEDURE — 25010000002 DEXAMETHASONE PER 1 MG: Performed by: INTERNAL MEDICINE

## 2017-10-02 PROCEDURE — 80053 COMPREHEN METABOLIC PANEL: CPT

## 2017-10-02 PROCEDURE — 96413 CHEMO IV INFUSION 1 HR: CPT | Performed by: INTERNAL MEDICINE

## 2017-10-02 PROCEDURE — 96368 THER/DIAG CONCURRENT INF: CPT | Performed by: INTERNAL MEDICINE

## 2017-10-02 PROCEDURE — 25010000002 PALONOSETRON PER 25 MCG: Performed by: INTERNAL MEDICINE

## 2017-10-02 PROCEDURE — 96375 TX/PRO/DX INJ NEW DRUG ADDON: CPT | Performed by: INTERNAL MEDICINE

## 2017-10-02 PROCEDURE — 81003 URINALYSIS AUTO W/O SCOPE: CPT

## 2017-10-02 PROCEDURE — 85025 COMPLETE CBC W/AUTO DIFF WBC: CPT

## 2017-10-02 PROCEDURE — 25010000002 FLUOROURACIL PER 500 MG: Performed by: INTERNAL MEDICINE

## 2017-10-02 PROCEDURE — 96416 CHEMO PROLONG INFUSE W/PUMP: CPT | Performed by: INTERNAL MEDICINE

## 2017-10-02 PROCEDURE — 25010000002 BEVACIZUMAB PER 10 MG: Performed by: INTERNAL MEDICINE

## 2017-10-02 PROCEDURE — 25010000002 OXALIPLATIN PER 0.5 MG: Performed by: INTERNAL MEDICINE

## 2017-10-02 PROCEDURE — 99214 OFFICE O/P EST MOD 30 MIN: CPT | Performed by: INTERNAL MEDICINE

## 2017-10-02 PROCEDURE — 25010000002 LEUCOVORIN CALCIUM PER 50 MG: Performed by: INTERNAL MEDICINE

## 2017-10-02 PROCEDURE — 96415 CHEMO IV INFUSION ADDL HR: CPT | Performed by: INTERNAL MEDICINE

## 2017-10-02 RX ORDER — DEXTROSE MONOHYDRATE 50 MG/ML
250 INJECTION, SOLUTION INTRAVENOUS ONCE
Status: COMPLETED | OUTPATIENT
Start: 2017-10-02 | End: 2017-10-02

## 2017-10-02 RX ORDER — LORAZEPAM 1 MG/1
1 TABLET ORAL EVERY 12 HOURS PRN
Qty: 45 TABLET | Refills: 1 | Status: SHIPPED | OUTPATIENT
Start: 2017-10-02 | End: 2018-01-01

## 2017-10-02 RX ORDER — DEXTROSE MONOHYDRATE 50 MG/ML
250 INJECTION, SOLUTION INTRAVENOUS ONCE
Status: CANCELLED | OUTPATIENT
Start: 2017-10-30

## 2017-10-02 RX ORDER — PALONOSETRON 0.05 MG/ML
0.25 INJECTION, SOLUTION INTRAVENOUS ONCE
Status: CANCELLED | OUTPATIENT
Start: 2017-10-02

## 2017-10-02 RX ORDER — PALONOSETRON 0.05 MG/ML
0.25 INJECTION, SOLUTION INTRAVENOUS ONCE
Status: COMPLETED | OUTPATIENT
Start: 2017-10-02 | End: 2017-10-02

## 2017-10-02 RX ORDER — SODIUM CHLORIDE 9 MG/ML
250 INJECTION, SOLUTION INTRAVENOUS ONCE
Status: CANCELLED | OUTPATIENT
Start: 2017-10-30

## 2017-10-02 RX ORDER — PALONOSETRON 0.05 MG/ML
0.25 INJECTION, SOLUTION INTRAVENOUS ONCE
Status: CANCELLED | OUTPATIENT
Start: 2017-10-16

## 2017-10-02 RX ORDER — SODIUM CHLORIDE 9 MG/ML
250 INJECTION, SOLUTION INTRAVENOUS ONCE
Status: CANCELLED | OUTPATIENT
Start: 2017-10-02

## 2017-10-02 RX ORDER — SODIUM CHLORIDE 9 MG/ML
250 INJECTION, SOLUTION INTRAVENOUS ONCE
Status: CANCELLED | OUTPATIENT
Start: 2017-10-16

## 2017-10-02 RX ORDER — DEXTROSE MONOHYDRATE 50 MG/ML
250 INJECTION, SOLUTION INTRAVENOUS ONCE
Status: CANCELLED | OUTPATIENT
Start: 2017-10-02

## 2017-10-02 RX ORDER — DEXTROSE MONOHYDRATE 50 MG/ML
250 INJECTION, SOLUTION INTRAVENOUS ONCE
Status: CANCELLED | OUTPATIENT
Start: 2017-10-16

## 2017-10-02 RX ORDER — PALONOSETRON 0.05 MG/ML
0.25 INJECTION, SOLUTION INTRAVENOUS ONCE
Status: CANCELLED | OUTPATIENT
Start: 2017-10-30

## 2017-10-02 RX ORDER — SODIUM CHLORIDE 9 MG/ML
250 INJECTION, SOLUTION INTRAVENOUS ONCE
Status: COMPLETED | OUTPATIENT
Start: 2017-10-02 | End: 2017-10-02

## 2017-10-02 RX ADMIN — FLUOROURACIL 2900 MG: 50 INJECTION, SOLUTION INTRAVENOUS at 12:22

## 2017-10-02 RX ADMIN — SODIUM CHLORIDE 250 ML: 900 INJECTION, SOLUTION INTRAVENOUS at 09:00

## 2017-10-02 RX ADMIN — BEVACIZUMAB 370 MG: 400 INJECTION, SOLUTION INTRAVENOUS at 09:46

## 2017-10-02 RX ADMIN — PALONOSETRON HYDROCHLORIDE 0.25 MG: 0.25 INJECTION INTRAVENOUS at 09:11

## 2017-10-02 RX ADMIN — OXALIPLATIN 115 MG: 100 INJECTION, SOLUTION INTRAVENOUS at 10:26

## 2017-10-02 RX ADMIN — LEUCOVORIN CALCIUM 540 MG: 350 INJECTION, POWDER, LYOPHILIZED, FOR SOLUTION INTRAMUSCULAR; INTRAVENOUS at 10:26

## 2017-10-02 RX ADMIN — DEXAMETHASONE SODIUM PHOSPHATE 12 MG: 10 INJECTION INTRAMUSCULAR; INTRAVENOUS at 09:13

## 2017-10-02 RX ADMIN — DEXTROSE MONOHYDRATE 250 ML: 5 INJECTION, SOLUTION INTRAVENOUS at 10:25

## 2017-10-02 NOTE — PROGRESS NOTES
[facility] GROUP OUTPATIENT FOLLOW UP CLINIC VISIT    REASON FOR FOLLOW-UP:    Malignant neoplasm of rectum    Staging form: Colon and Rectum, AJCC V7      Pathologic: Stage IIIC (T4b, N1b, cM0) - Signed by Jude Stewart MD on 12/9/2016        Staging comments: Suspected lung metastases.        Clinical: Stage TRUDY (T4b, N1b, M1a) - Signed by Jude Stewart MD on 12/19/2016  Kras mutation positive.  BRAF negative.  Microsatellite stable.      HISTORY OF PRESENT ILLNESS:  Benigno Quispe is a 57 y.o. female who returns for cycle 9 FOLFOX.  We held her treatment last week because of significant abdominal pain and generalized ill feeling.  We obtained imaging studies that revealed stable malignancy and no real obvious reason for her significant abdominal pain.  She is feeling better today.  The Prilosec helped her abdominal pain.  Hasn't resolved completely though.  She does complain of fatigue.  She also feels like she is irritable.  She has not been taking the Celexa recently but feels like she needs something that's more quick acting.  She denies any fevers, chills, night sweats.      ONCOLOGIC HISTORY:     Malignant neoplasm of rectum    10/6/2016 Biopsy     Upper and lower endoscopy by Dr. Keller:  Rectal mass showing invasive moderately differentiated adenocarcinoma.           10/12/2016 Imaging     CT imaging showed bilateral pulmonary nodules with the largest in the left lower lobe measuring 11 mm, concerning for pulmonary metastases. A mass in the pelvis measured about 4 x 3 cm with a 15 mm lymph node in the right pelvis.         10/31/2016 Surgery     APR with posterior vaginectomy by Brie Clemente and Marisa Burris. Pathology showed a 5.5 cm low-grade well to moderately differentiated adenocarcinoma with 2 of 20 lymph nodes positive for metastatic disease and a positive radial margin.         10/31/2016 Imaging     PET scan:  Multiple bilateral hypermetabolic pulmonary nodules likely  represent  metastases. The hypermetabolic nodes along both pelvic  sidewalls are suspected to be metastatic. The activity along the anal  canal and the activity within shotty bilateral groin nodes is  indeterminate.         2/13/2017 -  Chemotherapy     OP COLORECTAL FOLFIRI + Bevacizumab (Irinotecan / Leucovorin / Fluorouracil / Bevacizumab)           5/15/2017 Progression            5/23/2017 -  Chemotherapy     OP COLON mFOLFOX6 + Bevacizumab (OXALIplatin / Leucovorin / Fluorouracil / Bevacizumab)              PAST MEDICAL, SURGICAL, FAMILY, AND SOCIAL HISTORIES were reviewed with the patient and in the electronic medical record, and were updated if indicated.    ALLERGIES:  Allergies   Allergen Reactions   • Adhesive Tape        MEDICATIONS:  The medication list has been reviewed with the patient by the medical assistant, and the list has been updated in the electronic medical record, which I reviewed.  Medication dosages and frequencies were confirmed to be accurate.    REVIEW OF SYSTEMS:  Review of Systems   Constitutional: Positive for fatigue. Negative for activity change, appetite change, chills, diaphoresis, fever and unexpected weight change.   Respiratory: Negative for cough, chest tightness and shortness of breath.    Cardiovascular: Negative for chest pain and leg swelling.   Gastrointestinal: Positive for abdominal pain (improving) and nausea (improving). Negative for abdominal distention, blood in stool, constipation, diarrhea, rectal pain and vomiting.   Endocrine: Negative.    Genitourinary: Negative.    Musculoskeletal: Negative for arthralgias, joint swelling and myalgias.   Allergic/Immunologic: Negative.    Neurological: Positive for light-headedness.   Hematological: Negative for adenopathy. Does not bruise/bleed easily.   Psychiatric/Behavioral: Positive for agitation. Negative for confusion and dysphoric mood. The patient is not nervous/anxious.      Objective     Vitals:    10/02/17 0811   BP: 122/80    Pulse: 72   Resp: 16   Temp: 98.2 °F (36.8 °C)   GENERAL:  Well-developed, well-nourished female in no acute distress. Appearing much more comfortable today.   SKIN:  Warm, dry without rashes, purpura or petechiae.  HEAD:  Normocephalic.  EYES:  EOMs intact.  Conjunctivae normal.  EARS:  Hearing intact.  NOSE:  Septum midline.  No excoriations or nasal discharge.  MOUTH:  Tongue is well-papillated; no stomatitis or ulcers.  Lips normal.  THROAT:  Oropharynx without lesions or exudates.  LYMPHATICS:  No cervical, supraclavicular, axillary adenopathy.  CHEST:  Lungs clear to percussion and auscultation. Good airflow. mediport in place.   CARDIAC:  Regular rate and rhythm without murmurs, rubs or gallops. Normal S1,S2.  ABDOMEN:  Soft, nontender, normal bowel sounds, ostomy present  EXTREMITIES:  No clubbing, cyanosis or edema.  PSYCHIATRIC:  Normal affect and mood.        DIAGNOSTIC DATA:  Results for orders placed or performed in visit on 10/02/17   CBC Auto Differential   Result Value Ref Range    WBC 4.55 4.00 - 10.00 10*3/mm3    RBC 4.49 3.90 - 5.00 10*6/mm3    Hemoglobin 13.1 11.5 - 14.9 g/dL    Hematocrit 40.0 34.0 - 45.0 %    MCV 89.1 83.0 - 97.0 fL    MCH 29.2 27.0 - 33.0 pg    MCHC 32.8 32.0 - 35.0 g/dL    RDW 14.6 (H) 11.7 - 14.5 %    RDW-SD 47.6 37.0 - 49.0 fl    MPV 9.3 8.9 - 12.1 fL    Platelets 143 (L) 150 - 375 10*3/mm3    Neutrophil % 50.5 39.0 - 75.0 %    Lymphocyte % 34.1 20.0 - 49.0 %    Monocyte % 12.1 (H) 4.0 - 12.0 %    Eosinophil % 2.0 1.0 - 5.0 %    Basophil % 0.9 0.0 - 1.1 %    Immature Grans % 0.4 0.0 - 0.5 %    Neutrophils, Absolute 2.30 1.50 - 7.00 10*3/mm3    Lymphocytes, Absolute 1.55 1.00 - 3.50 10*3/mm3    Monocytes, Absolute 0.55 0.25 - 0.80 10*3/mm3    Eosinophils, Absolute 0.09 0.00 - 0.36 10*3/mm3    Basophils, Absolute 0.04 0.00 - 0.10 10*3/mm3    Immature Grans, Absolute 0.02 0.00 - 0.03 10*3/mm3    nRBC 0.0 0.0 - 0.0 /100 WBC       **I reviewed scans myself and concur with the  below dictation.    Ct Chest With Contrast    Result Date: 9/29/2017  Narrative: CT CHEST, ABDOMEN, AND PELVIS WITH IV CONTRAST  HISTORY: 57-year-old female with metastatic rectal cancer. Follow-up. Evaluate for metastatic disease.  TECHNIQUE: Radiation dose reduction techniques were utilized, including automated exposure control and exposure modulation based on body size. 3 mm images were obtained through the chest, abdomen, and pelvis after the administration of IV contrast. Compared with CTs from 07/24/2017.  FINDINGS: CHEST: There is no significant change in the size or number of pulmonary metastases. A 1.2 cm nodule at the superior segment of the right lower lobe is stable. A 1.4 cm pleural-based nodule at the left lower lobe is unchanged. A 1.1 cm right apical nodule is stable. No new pulmonary opacities are seen and there are no pleural or pericardial effusions. There are shotty hilar nodes which are stable. Left MediPort catheter tip is within the SVC.  ABDOMEN/PELVIS: There is no lymphadenopathy and there is no change in the appearance of the rectal bed. No acute bowel abnormality is seen. Left lower abdominal colostomy and small parastomal hernia containing fat appear stable. Liver, gallbladder, pancreas, adrenals, and kidneys appear unremarkable. Mild splenomegaly is stable. Uterine fibroids and adnexa appear unchanged.      Impression: 1. Stable pulmonary metastases. No new metastatic disease is seen within the chest. 2. There is no lymphadenopathy or convincing evidence for metastatic disease within the abdomen or pelvis.  This report was finalized on 9/29/2017 12:52 PM by Dr. Alea Silva MD.      Ct Abdomen Pelvis With Contrast    Result Date: 9/29/2017  Narrative: CT CHEST, ABDOMEN, AND PELVIS WITH IV CONTRAST  HISTORY: 57-year-old female with metastatic rectal cancer. Follow-up. Evaluate for metastatic disease.  TECHNIQUE: Radiation dose reduction techniques were utilized, including automated  exposure control and exposure modulation based on body size. 3 mm images were obtained through the chest, abdomen, and pelvis after the administration of IV contrast. Compared with CTs from 07/24/2017.  FINDINGS: CHEST: There is no significant change in the size or number of pulmonary metastases. A 1.2 cm nodule at the superior segment of the right lower lobe is stable. A 1.4 cm pleural-based nodule at the left lower lobe is unchanged. A 1.1 cm right apical nodule is stable. No new pulmonary opacities are seen and there are no pleural or pericardial effusions. There are shotty hilar nodes which are stable. Left MediPort catheter tip is within the SVC.  ABDOMEN/PELVIS: There is no lymphadenopathy and there is no change in the appearance of the rectal bed. No acute bowel abnormality is seen. Left lower abdominal colostomy and small parastomal hernia containing fat appear stable. Liver, gallbladder, pancreas, adrenals, and kidneys appear unremarkable. Mild splenomegaly is stable. Uterine fibroids and adnexa appear unchanged.      Impression: 1. Stable pulmonary metastases. No new metastatic disease is seen within the chest. 2. There is no lymphadenopathy or convincing evidence for metastatic disease within the abdomen or pelvis.  This report was finalized on 9/29/2017 12:52 PM by Dr. Alea Silva MD.        Assessment/Plan   ASSESSMENT/PLAN:  This is a 57 y.o. female with:  1.  Metastatic rectal cancer, Kras mutated: She had a resection on 10/31/2016 by Brie Clemente and [place].  Unfortunately, PET scan shows evidence for local lydia metastases as well as bilateral pulmonary metastases.    - Palliative FOLFIRI/Avastin started 2/10/17. Multiple dose reductions due to cytopenias. Stopped 5/2017 due to disease progression   - FOLFOX with Avastin initiated 5/23/2017 with initial dose reduction of 25% across all agents and Granix. 5-FU further dose reduced due to neutropenia.     - Scans late 9/2017 stable.     - Continue  FOLFOX/Avastin with granix days 4-6. She doesn't want to come in on Sundays.    - Plan re-image in about 10 weeks (early December)    2. Vitamin B12 deficiency. B12 injections in the past and currently on 2000 mcg oral B12 for time being.      3. Neutropenia before intiation of chemotherapy. Likely secondary to B12 deficiency and chemo.    4. Cancer related pain. Norco 10 mg every 6 hrs prn    5. Anxiety. Restart Celexa 20 mg daily and add Ativan 1 mg every 12 hours prn anxiety. She can use this instead of Klonipin for sleep.     7. Abdominal pain, nausea, chills. Resolved. Imaging without clear etiology.     RTC every 2 weeks for treatment; NP in 2 weeks, MD in 6 weeks.   In addition to chemotherapy monitoring we reviewed imaging studies today and discussed her anxiety.    Pilar Paez MD

## 2017-10-04 ENCOUNTER — INFUSION (OUTPATIENT)
Dept: ONCOLOGY | Facility: HOSPITAL | Age: 57
End: 2017-10-04

## 2017-10-04 DIAGNOSIS — Z45.2 FITTING AND ADJUSTMENT OF VASCULAR CATHETER: ICD-10-CM

## 2017-10-04 DIAGNOSIS — C20 MALIGNANT NEOPLASM OF RECTUM (HCC): Primary | ICD-10-CM

## 2017-10-04 PROCEDURE — 25010000002 HEPARIN FLUSH (PORCINE) 100 UNIT/ML SOLUTION: Performed by: INTERNAL MEDICINE

## 2017-10-04 PROCEDURE — 96523 IRRIG DRUG DELIVERY DEVICE: CPT | Performed by: INTERNAL MEDICINE

## 2017-10-04 RX ORDER — SODIUM CHLORIDE 0.9 % (FLUSH) 0.9 %
10 SYRINGE (ML) INJECTION AS NEEDED
Status: CANCELLED | OUTPATIENT
Start: 2017-10-04

## 2017-10-04 RX ORDER — SODIUM CHLORIDE 0.9 % (FLUSH) 0.9 %
10 SYRINGE (ML) INJECTION AS NEEDED
Status: DISCONTINUED | OUTPATIENT
Start: 2017-10-04 | End: 2017-10-04 | Stop reason: HOSPADM

## 2017-10-04 RX ADMIN — Medication 10 ML: at 11:00

## 2017-10-04 RX ADMIN — SODIUM CHLORIDE, PRESERVATIVE FREE 500 UNITS: 5 INJECTION INTRAVENOUS at 11:00

## 2017-10-05 ENCOUNTER — INFUSION (OUTPATIENT)
Dept: ONCOLOGY | Facility: HOSPITAL | Age: 57
End: 2017-10-05

## 2017-10-05 VITALS — TEMPERATURE: 97.3 F

## 2017-10-05 DIAGNOSIS — C20 MALIGNANT NEOPLASM OF RECTUM (HCC): Primary | ICD-10-CM

## 2017-10-05 PROCEDURE — 25010000002 TBO-FILGRASTIM 480 MCG/0.8ML SOLUTION PREFILLED SYRINGE: Performed by: INTERNAL MEDICINE

## 2017-10-05 PROCEDURE — 96372 THER/PROPH/DIAG INJ SC/IM: CPT | Performed by: INTERNAL MEDICINE

## 2017-10-05 RX ADMIN — TBO-FILGRASTIM 480 MCG: 480 INJECTION, SOLUTION SUBCUTANEOUS at 14:47

## 2017-10-06 ENCOUNTER — INFUSION (OUTPATIENT)
Dept: ONCOLOGY | Facility: HOSPITAL | Age: 57
End: 2017-10-06

## 2017-10-06 DIAGNOSIS — C20 MALIGNANT NEOPLASM OF RECTUM (HCC): Primary | ICD-10-CM

## 2017-10-06 PROCEDURE — 25010000002 TBO-FILGRASTIM 480 MCG/0.8ML SOLUTION PREFILLED SYRINGE: Performed by: INTERNAL MEDICINE

## 2017-10-06 PROCEDURE — 96372 THER/PROPH/DIAG INJ SC/IM: CPT | Performed by: INTERNAL MEDICINE

## 2017-10-06 RX ADMIN — TBO-FILGRASTIM 480 MCG: 480 INJECTION, SOLUTION SUBCUTANEOUS at 15:09

## 2017-10-07 ENCOUNTER — INFUSION (OUTPATIENT)
Dept: ONCOLOGY | Facility: HOSPITAL | Age: 57
End: 2017-10-07

## 2017-10-07 VITALS
TEMPERATURE: 97 F | RESPIRATION RATE: 18 BRPM | HEART RATE: 72 BPM | DIASTOLIC BLOOD PRESSURE: 77 MMHG | SYSTOLIC BLOOD PRESSURE: 147 MMHG | OXYGEN SATURATION: 97 %

## 2017-10-07 DIAGNOSIS — C20 MALIGNANT NEOPLASM OF RECTUM (HCC): Primary | ICD-10-CM

## 2017-10-07 PROCEDURE — 25010000002 TBO-FILGRASTIM 480 MCG/0.8ML SOLUTION PREFILLED SYRINGE: Performed by: INTERNAL MEDICINE

## 2017-10-07 PROCEDURE — 96372 THER/PROPH/DIAG INJ SC/IM: CPT

## 2017-10-07 RX ADMIN — TBO-FILGRASTIM 480 MCG: 480 INJECTION, SOLUTION SUBCUTANEOUS at 14:00

## 2017-10-16 ENCOUNTER — INFUSION (OUTPATIENT)
Dept: ONCOLOGY | Facility: HOSPITAL | Age: 57
End: 2017-10-16

## 2017-10-16 ENCOUNTER — OFFICE VISIT (OUTPATIENT)
Dept: ONCOLOGY | Facility: CLINIC | Age: 57
End: 2017-10-16

## 2017-10-16 VITALS
BODY MASS INDEX: 29.8 KG/M2 | WEIGHT: 168.2 LBS | HEART RATE: 92 BPM | DIASTOLIC BLOOD PRESSURE: 76 MMHG | HEIGHT: 63 IN | SYSTOLIC BLOOD PRESSURE: 138 MMHG | TEMPERATURE: 97.8 F | OXYGEN SATURATION: 99 % | RESPIRATION RATE: 12 BRPM

## 2017-10-16 VITALS — SYSTOLIC BLOOD PRESSURE: 129 MMHG | DIASTOLIC BLOOD PRESSURE: 95 MMHG | HEART RATE: 79 BPM

## 2017-10-16 DIAGNOSIS — R82.998 URINE LEUKOCYTES: ICD-10-CM

## 2017-10-16 DIAGNOSIS — D70.8 OTHER NEUTROPENIA (HCC): ICD-10-CM

## 2017-10-16 DIAGNOSIS — R82.998 URINE LEUKOCYTES: Primary | ICD-10-CM

## 2017-10-16 DIAGNOSIS — D51.9 ANEMIA DUE TO VITAMIN B12 DEFICIENCY, UNSPECIFIED B12 DEFICIENCY TYPE: ICD-10-CM

## 2017-10-16 DIAGNOSIS — C20 MALIGNANT NEOPLASM OF RECTUM (HCC): Primary | ICD-10-CM

## 2017-10-16 DIAGNOSIS — C20 MALIGNANT NEOPLASM OF RECTUM (HCC): ICD-10-CM

## 2017-10-16 LAB
ALBUMIN SERPL-MCNC: 3.8 G/DL (ref 3.5–5.2)
ALBUMIN/GLOB SERPL: 1.3 G/DL (ref 1.1–2.4)
ALP SERPL-CCNC: 80 U/L (ref 38–116)
ALT SERPL W P-5'-P-CCNC: 21 U/L (ref 0–33)
ANION GAP SERPL CALCULATED.3IONS-SCNC: 11.2 MMOL/L
AST SERPL-CCNC: 29 U/L (ref 0–32)
BASOPHILS # BLD AUTO: 0.02 10*3/MM3 (ref 0–0.1)
BASOPHILS NFR BLD AUTO: 0.7 % (ref 0–1.1)
BILIRUB SERPL-MCNC: 0.5 MG/DL (ref 0.1–1.2)
BILIRUB UR QL STRIP: NEGATIVE
BUN BLD-MCNC: 13 MG/DL (ref 6–20)
BUN/CREAT SERPL: 17.6 (ref 7.3–30)
CALCIUM SPEC-SCNC: 8.9 MG/DL (ref 8.5–10.2)
CHLORIDE SERPL-SCNC: 105 MMOL/L (ref 98–107)
CLARITY UR: ABNORMAL
CO2 SERPL-SCNC: 25.8 MMOL/L (ref 22–29)
COLOR UR: YELLOW
CREAT BLD-MCNC: 0.74 MG/DL (ref 0.6–1.1)
DEPRECATED RDW RBC AUTO: 47.5 FL (ref 37–49)
EOSINOPHIL # BLD AUTO: 0.08 10*3/MM3 (ref 0–0.36)
EOSINOPHIL NFR BLD AUTO: 2.7 % (ref 1–5)
ERYTHROCYTE [DISTWIDTH] IN BLOOD BY AUTOMATED COUNT: 15.1 % (ref 11.7–14.5)
GFR SERPL CREATININE-BSD FRML MDRD: 81 ML/MIN/1.73
GLOBULIN UR ELPH-MCNC: 3 GM/DL (ref 1.8–3.5)
GLUCOSE BLD-MCNC: 94 MG/DL (ref 74–124)
GLUCOSE UR STRIP-MCNC: NEGATIVE MG/DL
HCT VFR BLD AUTO: 38.3 % (ref 34–45)
HGB BLD-MCNC: 12.7 G/DL (ref 11.5–14.9)
HGB UR QL STRIP.AUTO: ABNORMAL
IMM GRANULOCYTES # BLD: 0.01 10*3/MM3 (ref 0–0.03)
IMM GRANULOCYTES NFR BLD: 0.3 % (ref 0–0.5)
KETONES UR QL STRIP: NEGATIVE
LEUKOCYTE ESTERASE UR QL STRIP.AUTO: ABNORMAL
LYMPHOCYTES # BLD AUTO: 1.22 10*3/MM3 (ref 1–3.5)
LYMPHOCYTES NFR BLD AUTO: 41.1 % (ref 20–49)
MCH RBC QN AUTO: 29.3 PG (ref 27–33)
MCHC RBC AUTO-ENTMCNC: 33.2 G/DL (ref 32–35)
MCV RBC AUTO: 88.5 FL (ref 83–97)
MONOCYTES # BLD AUTO: 0.31 10*3/MM3 (ref 0.25–0.8)
MONOCYTES NFR BLD AUTO: 10.4 % (ref 4–12)
NEUTROPHILS # BLD AUTO: 1.33 10*3/MM3 (ref 1.5–7)
NEUTROPHILS NFR BLD AUTO: 44.8 % (ref 39–75)
NITRITE UR QL STRIP: NEGATIVE
NRBC BLD MANUAL-RTO: 0 /100 WBC (ref 0–0)
PH UR STRIP.AUTO: <=5 [PH] (ref 4.5–8)
PLATELET # BLD AUTO: 105 10*3/MM3 (ref 150–375)
PMV BLD AUTO: 9.7 FL (ref 8.9–12.1)
POTASSIUM BLD-SCNC: 4.1 MMOL/L (ref 3.5–4.7)
PROT SERPL-MCNC: 6.8 G/DL (ref 6.3–8)
PROT UR QL STRIP: NEGATIVE
RBC # BLD AUTO: 4.33 10*6/MM3 (ref 3.9–5)
SODIUM BLD-SCNC: 142 MMOL/L (ref 134–145)
SP GR UR STRIP: 1.02 (ref 1–1.03)
UROBILINOGEN UR QL STRIP: ABNORMAL
WBC NRBC COR # BLD: 2.97 10*3/MM3 (ref 4–10)

## 2017-10-16 PROCEDURE — 96417 CHEMO IV INFUS EACH ADDL SEQ: CPT | Performed by: NURSE PRACTITIONER

## 2017-10-16 PROCEDURE — 87086 URINE CULTURE/COLONY COUNT: CPT | Performed by: NURSE PRACTITIONER

## 2017-10-16 PROCEDURE — 25010000002 DEXAMETHASONE PER 1 MG: Performed by: INTERNAL MEDICINE

## 2017-10-16 PROCEDURE — 85025 COMPLETE CBC W/AUTO DIFF WBC: CPT

## 2017-10-16 PROCEDURE — 25010000002 FLUOROURACIL PER 500 MG: Performed by: INTERNAL MEDICINE

## 2017-10-16 PROCEDURE — 96368 THER/DIAG CONCURRENT INF: CPT | Performed by: NURSE PRACTITIONER

## 2017-10-16 PROCEDURE — 99214 OFFICE O/P EST MOD 30 MIN: CPT | Performed by: NURSE PRACTITIONER

## 2017-10-16 PROCEDURE — 81003 URINALYSIS AUTO W/O SCOPE: CPT

## 2017-10-16 PROCEDURE — 25010000002 BEVACIZUMAB PER 10 MG: Performed by: INTERNAL MEDICINE

## 2017-10-16 PROCEDURE — 25010000002 PALONOSETRON PER 25 MCG: Performed by: INTERNAL MEDICINE

## 2017-10-16 PROCEDURE — 96413 CHEMO IV INFUSION 1 HR: CPT | Performed by: NURSE PRACTITIONER

## 2017-10-16 PROCEDURE — 25010000002 OXALIPLATIN PER 0.5 MG: Performed by: INTERNAL MEDICINE

## 2017-10-16 PROCEDURE — 80053 COMPREHEN METABOLIC PANEL: CPT

## 2017-10-16 PROCEDURE — 25010000002 LEUCOVORIN CALCIUM PER 50 MG: Performed by: INTERNAL MEDICINE

## 2017-10-16 PROCEDURE — 96416 CHEMO PROLONG INFUSE W/PUMP: CPT | Performed by: NURSE PRACTITIONER

## 2017-10-16 PROCEDURE — 96375 TX/PRO/DX INJ NEW DRUG ADDON: CPT | Performed by: NURSE PRACTITIONER

## 2017-10-16 PROCEDURE — 96415 CHEMO IV INFUSION ADDL HR: CPT | Performed by: NURSE PRACTITIONER

## 2017-10-16 RX ORDER — PALONOSETRON 0.05 MG/ML
0.25 INJECTION, SOLUTION INTRAVENOUS ONCE
Status: COMPLETED | OUTPATIENT
Start: 2017-10-16 | End: 2017-10-16

## 2017-10-16 RX ORDER — SODIUM CHLORIDE 9 MG/ML
250 INJECTION, SOLUTION INTRAVENOUS ONCE
Status: COMPLETED | OUTPATIENT
Start: 2017-10-16 | End: 2017-10-16

## 2017-10-16 RX ORDER — DEXTROSE MONOHYDRATE 50 MG/ML
250 INJECTION, SOLUTION INTRAVENOUS ONCE
Status: COMPLETED | OUTPATIENT
Start: 2017-10-16 | End: 2017-10-16

## 2017-10-16 RX ORDER — PENCICLOVIR 10 MG/G
CREAM TOPICAL 4 TIMES DAILY
Qty: 1.5 G | Refills: 0 | Status: SHIPPED | OUTPATIENT
Start: 2017-10-16 | End: 2018-01-31

## 2017-10-16 RX ADMIN — PALONOSETRON HYDROCHLORIDE 0.25 MG: 0.25 INJECTION INTRAVENOUS at 09:35

## 2017-10-16 RX ADMIN — OXALIPLATIN 115 MG: 5 INJECTION, SOLUTION, CONCENTRATE INTRAVENOUS at 10:40

## 2017-10-16 RX ADMIN — SODIUM CHLORIDE 250 ML: 900 INJECTION, SOLUTION INTRAVENOUS at 09:34

## 2017-10-16 RX ADMIN — LEUCOVORIN CALCIUM 540 MG: 350 INJECTION, POWDER, LYOPHILIZED, FOR SOLUTION INTRAMUSCULAR; INTRAVENOUS at 10:40

## 2017-10-16 RX ADMIN — FLUOROURACIL 2900 MG: 50 INJECTION, SOLUTION INTRAVENOUS at 12:42

## 2017-10-16 RX ADMIN — DEXTROSE MONOHYDRATE 250 ML: 5 INJECTION, SOLUTION INTRAVENOUS at 10:40

## 2017-10-16 RX ADMIN — DEXAMETHASONE SODIUM PHOSPHATE 12 MG: 10 INJECTION INTRAMUSCULAR; INTRAVENOUS at 09:36

## 2017-10-16 RX ADMIN — BEVACIZUMAB 370 MG: 400 INJECTION, SOLUTION INTRAVENOUS at 09:52

## 2017-10-16 NOTE — PROGRESS NOTES
[facility] GROUP OUTPATIENT FOLLOW UP CLINIC VISIT    REASON FOR FOLLOW-UP:    Malignant neoplasm of rectum    Staging form: Colon and Rectum, AJCC V7      Pathologic: Stage IIIC (T4b, N1b, cM0) - Signed by Jude Stewart MD on 12/9/2016        Staging comments: Suspected lung metastases.        Clinical: Stage TRUDY (T4b, N1b, M1a) - Signed by Jude Stewart MD on 12/19/2016  Kras mutation positive.  BRAF negative.  Microsatellite stable.      HISTORY OF PRESENT ILLNESS:  Benigno Quispe is a 57 y.o. female who returns for cycle 10 FOLFOX.  Previously, cycle 9 was delayed 1 week due to abdominal pain.  Patient was initiated on omeprazole with resolution of her abdominal pain.  She reports feeling well the past 2 weeks.  Her nausea is well-controlled with her medications.  She denies neuropathy.  She reports she is eating and drinking adequately.  She denies fevers or chills, pain or burning with urination.  Her ostomy output remains unchanged.    ONCOLOGIC HISTORY:     Malignant neoplasm of rectum    10/6/2016 Biopsy     Upper and lower endoscopy by Dr. Keller:  Rectal mass showing invasive moderately differentiated adenocarcinoma.           10/12/2016 Imaging     CT imaging showed bilateral pulmonary nodules with the largest in the left lower lobe measuring 11 mm, concerning for pulmonary metastases. A mass in the pelvis measured about 4 x 3 cm with a 15 mm lymph node in the right pelvis.         10/31/2016 Surgery     APR with posterior vaginectomy by Brie Clemente and Marisa Burris. Pathology showed a 5.5 cm low-grade well to moderately differentiated adenocarcinoma with 2 of 20 lymph nodes positive for metastatic disease and a positive radial margin.         10/31/2016 Imaging     PET scan:  Multiple bilateral hypermetabolic pulmonary nodules likely  represent metastases. The hypermetabolic nodes along both pelvic  sidewalls are suspected to be metastatic. The activity along the anal  canal and the  activity within shotty bilateral groin nodes is  indeterminate.         2/13/2017 -  Chemotherapy     OP COLORECTAL FOLFIRI + Bevacizumab (Irinotecan / Leucovorin / Fluorouracil / Bevacizumab)           5/15/2017 Progression            5/23/2017 -  Chemotherapy     OP COLON mFOLFOX6 + Bevacizumab (OXALIplatin / Leucovorin / Fluorouracil / Bevacizumab)              PAST MEDICAL, SURGICAL, FAMILY, AND SOCIAL HISTORIES were reviewed with the patient and in the electronic medical record, and were updated if indicated.    ALLERGIES:  Allergies   Allergen Reactions   • Adhesive Tape        MEDICATIONS:  The medication list has been reviewed with the patient by the medical assistant, and the list has been updated in the electronic medical record, which I reviewed.  Medication dosages and frequencies were confirmed to be accurate.    I have reviewed the patient's medical history in detail and updated the computerized patient record.     REVIEW OF SYSTEMS:  Review of Systems   Constitutional: Positive for fatigue. Negative for activity change, appetite change, chills, diaphoresis, fever and unexpected weight change.   Respiratory: Negative for cough, chest tightness and shortness of breath.    Cardiovascular: Negative for chest pain and leg swelling.   Gastrointestinal: Positive for abdominal pain (resolved with PPI) and nausea (controlled). Negative for abdominal distention, blood in stool, constipation, diarrhea, rectal pain and vomiting.   Endocrine: Negative.    Genitourinary: Negative.    Musculoskeletal: Negative for arthralgias, joint swelling and myalgias.   Allergic/Immunologic: Negative.    Neurological: Negative for light-headedness.   Hematological: Negative for adenopathy. Does not bruise/bleed easily.   Psychiatric/Behavioral: Positive for agitation. Negative for confusion and dysphoric mood. The patient is not nervous/anxious.      Objective     Vitals:    10/16/17 0831   BP: 138/76   Pulse: 92   Resp: 12    Temp: 97.8 °F (36.6 °C)   SpO2: 99%     GENERAL:  Well-developed, well-nourished female in no acute distress. Appearing much more comfortable today.   SKIN:  Warm, dry without rashes, purpura or petechiae.  HEAD:  Normocephalic.  EYES:  EOMs intact.  Conjunctivae normal.  EARS:  Hearing intact.  NOSE:  Septum midline.  No excoriations or nasal discharge.  MOUTH:  Tongue is well-papillated; no stomatitis or ulcers. Cold sore midline on the upper lip.  THROAT:  Oropharynx without lesions or exudates.  LYMPHATICS:  No cervical, supraclavicular, adenopathy.  CHEST:  Lungs clear to auscultation. Good airflow. mediport in place.   CARDIAC:  Regular rate and rhythm without murmurs, rubs or gallops. Normal S1,S2.  ABDOMEN:  Soft, nontender, normal bowel sounds, ostomy present, no organomegaly.  EXTREMITIES:  No clubbing, cyanosis or edema.  PSYCHIATRIC:  Normal affect and mood.      DIAGNOSTIC DATA:  Results for orders placed or performed in visit on 10/16/17   Comprehensive metabolic panel   Result Value Ref Range    Glucose 94 74 - 124 mg/dL    BUN 13 6 - 20 mg/dL    Creatinine 0.74 0.60 - 1.10 mg/dL    Sodium 142 134 - 145 mmol/L    Potassium 4.1 3.5 - 4.7 mmol/L    Chloride 105 98 - 107 mmol/L    CO2 25.8 22.0 - 29.0 mmol/L    Calcium 8.9 8.5 - 10.2 mg/dL    Total Protein 6.8 6.3 - 8.0 g/dL    Albumin 3.80 3.50 - 5.20 g/dL    ALT (SGPT) 21 0 - 33 U/L    AST (SGOT) 29 0 - 32 U/L    Alkaline Phosphatase 80 38 - 116 U/L    Total Bilirubin 0.5 0.1 - 1.2 mg/dL    eGFR Non African Amer 81 >60 mL/min/1.73    Globulin 3.0 1.8 - 3.5 gm/dL    A/G Ratio 1.3 1.1 - 2.4 g/dL    BUN/Creatinine Ratio 17.6 7.3 - 30.0    Anion Gap 11.2 mmol/L   Urinalysis - Urine, Clean Catch   Result Value Ref Range    Color, UA Yellow Yellow, Straw    Appearance, UA Cloudy (A) Clear    pH, UA <=5.0 4.5 - 8.0    Specific Gravity, UA 1.025 1.002 - 1.030    Glucose, UA Negative Negative    Ketones, UA Negative Negative    Bilirubin, UA Negative Negative     Blood, UA Small (1+) (A) Negative    Protein, UA Negative Negative    Leuk Esterase, UA Moderate (2+) (A) Negative    Nitrite, UA Negative Negative    Urobilinogen, UA 0.2 E.U./dL 0.2 - 1.0 E.U./dL   CBC Auto Differential   Result Value Ref Range    WBC 2.97 (L) 4.00 - 10.00 10*3/mm3    RBC 4.33 3.90 - 5.00 10*6/mm3    Hemoglobin 12.7 11.5 - 14.9 g/dL    Hematocrit 38.3 34.0 - 45.0 %    MCV 88.5 83.0 - 97.0 fL    MCH 29.3 27.0 - 33.0 pg    MCHC 33.2 32.0 - 35.0 g/dL    RDW 15.1 (H) 11.7 - 14.5 %    RDW-SD 47.5 37.0 - 49.0 fl    MPV 9.7 8.9 - 12.1 fL    Platelets 105 (L) 150 - 375 10*3/mm3    Neutrophil % 44.8 39.0 - 75.0 %    Lymphocyte % 41.1 20.0 - 49.0 %    Monocyte % 10.4 4.0 - 12.0 %    Eosinophil % 2.7 1.0 - 5.0 %    Basophil % 0.7 0.0 - 1.1 %    Immature Grans % 0.3 0.0 - 0.5 %    Neutrophils, Absolute 1.33 (L) 1.50 - 7.00 10*3/mm3    Lymphocytes, Absolute 1.22 1.00 - 3.50 10*3/mm3    Monocytes, Absolute 0.31 0.25 - 0.80 10*3/mm3    Eosinophils, Absolute 0.08 0.00 - 0.36 10*3/mm3    Basophils, Absolute 0.02 0.00 - 0.10 10*3/mm3    Immature Grans, Absolute 0.01 0.00 - 0.03 10*3/mm3    nRBC 0.0 0.0 - 0.0 /100 WBC       Assessment/Plan   ASSESSMENT/PLAN:  This is a 57 y.o. female with:  1.  Metastatic rectal cancer, Kras mutated: She had a resection on 10/31/2016 by Brie Clemente and [place].  Unfortunately, PET scan shows evidence for local lydia metastases as well as bilateral pulmonary metastases.    - Palliative FOLFIRI/Avastin started 2/10/17. Multiple dose reductions due to cytopenias. Stopped 5/2017 due to disease progression   - FOLFOX with Avastin initiated 5/23/2017 with initial dose reduction of 25% across all agents and Granix. 5-FU further dose reduced due to neutropenia.     - Scans late 9/2017 stable.     - Continue FOLFOX/Avastin with granix days 4-6. She doesn't want to come in on Sundays.    - Plan re-image in about 10 weeks (early December)    2. Vitamin B12 deficiency. B12 injections in the  past and currently on 2000 mcg oral B12 for time being.      3. Neutropenia before intiation of chemotherapy. Likely secondary to B12 deficiency and chemo.    4. Cancer related pain. Norco 10 mg every 6 hrs prn    5. Anxiety. Restart Celexa 20 mg daily and add Ativan 1 mg every 12 hours prn anxiety. She can use this instead of Klonipin for sleep.     7. Abdominal pain, nausea, chills. Resolved. Imaging without clear etiology.  Continuing on omeprazole.    8.  2+ leukocytes on urinalysis, asymptomatic.  Delay the initiation of antibiotics, urine culture pending.    PLAN:  1. Proceed with FOLFOX/Avastin today  2. Granix 480 µg days 4 through 6.  3. Continue omeprazole.  4. Due to leukocytes on urinalysis, urine culture pending.  No antibiotics initiated at this time, as the patient is asymptomatic.  5. Return in 2 weeks in anticipation of next cycle of FOLFOX Avastin.  6. M.D. follow-up in 4 weeks.  Plans to reimage early December.    Latesha Monroe, APRN  10/16/2017

## 2017-10-18 ENCOUNTER — INFUSION (OUTPATIENT)
Dept: ONCOLOGY | Facility: HOSPITAL | Age: 57
End: 2017-10-18

## 2017-10-18 DIAGNOSIS — Z45.2 FITTING AND ADJUSTMENT OF VASCULAR CATHETER: ICD-10-CM

## 2017-10-18 DIAGNOSIS — C20 MALIGNANT NEOPLASM OF RECTUM (HCC): Primary | ICD-10-CM

## 2017-10-18 LAB
BACTERIA SPEC AEROBE CULT: NORMAL
BACTERIA SPEC AEROBE CULT: NORMAL

## 2017-10-18 PROCEDURE — 96523 IRRIG DRUG DELIVERY DEVICE: CPT | Performed by: NURSE PRACTITIONER

## 2017-10-18 RX ORDER — SODIUM CHLORIDE 0.9 % (FLUSH) 0.9 %
10 SYRINGE (ML) INJECTION AS NEEDED
Status: DISCONTINUED | OUTPATIENT
Start: 2017-10-18 | End: 2017-10-18 | Stop reason: HOSPADM

## 2017-10-18 RX ORDER — SODIUM CHLORIDE 0.9 % (FLUSH) 0.9 %
10 SYRINGE (ML) INJECTION AS NEEDED
Status: CANCELLED | OUTPATIENT
Start: 2017-10-18

## 2017-10-19 ENCOUNTER — INFUSION (OUTPATIENT)
Dept: ONCOLOGY | Facility: HOSPITAL | Age: 57
End: 2017-10-19

## 2017-10-19 ENCOUNTER — APPOINTMENT (OUTPATIENT)
Dept: ONCOLOGY | Facility: HOSPITAL | Age: 57
End: 2017-10-19

## 2017-10-19 DIAGNOSIS — C20 MALIGNANT NEOPLASM OF RECTUM (HCC): Primary | ICD-10-CM

## 2017-10-19 PROCEDURE — 25010000002 TBO-FILGRASTIM 480 MCG/0.8ML SOLUTION PREFILLED SYRINGE: Performed by: INTERNAL MEDICINE

## 2017-10-19 PROCEDURE — 96372 THER/PROPH/DIAG INJ SC/IM: CPT | Performed by: NURSE PRACTITIONER

## 2017-10-19 RX ADMIN — TBO-FILGRASTIM 480 MCG: 480 INJECTION, SOLUTION SUBCUTANEOUS at 14:56

## 2017-10-20 ENCOUNTER — INFUSION (OUTPATIENT)
Dept: ONCOLOGY | Facility: HOSPITAL | Age: 57
End: 2017-10-20

## 2017-10-20 DIAGNOSIS — C20 MALIGNANT NEOPLASM OF RECTUM (HCC): Primary | ICD-10-CM

## 2017-10-20 PROCEDURE — 25010000002 TBO-FILGRASTIM 480 MCG/0.8ML SOLUTION PREFILLED SYRINGE: Performed by: INTERNAL MEDICINE

## 2017-10-20 PROCEDURE — 96372 THER/PROPH/DIAG INJ SC/IM: CPT | Performed by: INTERNAL MEDICINE

## 2017-10-20 RX ADMIN — TBO-FILGRASTIM 480 MCG: 480 INJECTION, SOLUTION SUBCUTANEOUS at 16:13

## 2017-10-21 ENCOUNTER — INFUSION (OUTPATIENT)
Dept: ONCOLOGY | Facility: HOSPITAL | Age: 57
End: 2017-10-21

## 2017-10-21 VITALS
TEMPERATURE: 97.7 F | DIASTOLIC BLOOD PRESSURE: 71 MMHG | RESPIRATION RATE: 18 BRPM | OXYGEN SATURATION: 98 % | HEART RATE: 86 BPM | SYSTOLIC BLOOD PRESSURE: 132 MMHG

## 2017-10-21 DIAGNOSIS — C20 MALIGNANT NEOPLASM OF RECTUM (HCC): Primary | ICD-10-CM

## 2017-10-21 PROCEDURE — 25010000002 TBO-FILGRASTIM 480 MCG/0.8ML SOLUTION PREFILLED SYRINGE: Performed by: INTERNAL MEDICINE

## 2017-10-21 PROCEDURE — 96372 THER/PROPH/DIAG INJ SC/IM: CPT

## 2017-10-21 RX ADMIN — TBO-FILGRASTIM 480 MCG: 480 INJECTION, SOLUTION SUBCUTANEOUS at 15:23

## 2017-10-30 ENCOUNTER — TELEPHONE (OUTPATIENT)
Dept: ONCOLOGY | Facility: HOSPITAL | Age: 57
End: 2017-10-30

## 2017-10-30 ENCOUNTER — INFUSION (OUTPATIENT)
Dept: ONCOLOGY | Facility: HOSPITAL | Age: 57
End: 2017-10-30

## 2017-10-30 VITALS
SYSTOLIC BLOOD PRESSURE: 152 MMHG | WEIGHT: 169.2 LBS | DIASTOLIC BLOOD PRESSURE: 86 MMHG | TEMPERATURE: 98.3 F | BODY MASS INDEX: 29.79 KG/M2 | HEART RATE: 84 BPM

## 2017-10-30 DIAGNOSIS — C20 MALIGNANT NEOPLASM OF RECTUM (HCC): ICD-10-CM

## 2017-10-30 DIAGNOSIS — C20 MALIGNANT NEOPLASM OF RECTUM (HCC): Primary | ICD-10-CM

## 2017-10-30 LAB
ALBUMIN SERPL-MCNC: 3.9 G/DL (ref 3.5–5.2)
ALBUMIN/GLOB SERPL: 1.2 G/DL (ref 1.1–2.4)
ALP SERPL-CCNC: 88 U/L (ref 38–116)
ALT SERPL W P-5'-P-CCNC: 29 U/L (ref 0–33)
ANION GAP SERPL CALCULATED.3IONS-SCNC: 11.3 MMOL/L
AST SERPL-CCNC: 39 U/L (ref 0–32)
BASOPHILS # BLD AUTO: 0.02 10*3/MM3 (ref 0–0.1)
BASOPHILS NFR BLD AUTO: 0.6 % (ref 0–1.1)
BILIRUB SERPL-MCNC: 0.5 MG/DL (ref 0.1–1.2)
BILIRUB UR QL STRIP: NEGATIVE
BUN BLD-MCNC: 12 MG/DL (ref 6–20)
BUN/CREAT SERPL: 17.1 (ref 7.3–30)
CALCIUM SPEC-SCNC: 9.5 MG/DL (ref 8.5–10.2)
CHLORIDE SERPL-SCNC: 104 MMOL/L (ref 98–107)
CLARITY UR: CLEAR
CO2 SERPL-SCNC: 25.7 MMOL/L (ref 22–29)
COLOR UR: YELLOW
CREAT BLD-MCNC: 0.7 MG/DL (ref 0.6–1.1)
DEPRECATED RDW RBC AUTO: 49.8 FL (ref 37–49)
EOSINOPHIL # BLD AUTO: 0.11 10*3/MM3 (ref 0–0.36)
EOSINOPHIL NFR BLD AUTO: 3.4 % (ref 1–5)
ERYTHROCYTE [DISTWIDTH] IN BLOOD BY AUTOMATED COUNT: 15.4 % (ref 11.7–14.5)
GFR SERPL CREATININE-BSD FRML MDRD: 86 ML/MIN/1.73
GLOBULIN UR ELPH-MCNC: 3.3 GM/DL (ref 1.8–3.5)
GLUCOSE BLD-MCNC: 89 MG/DL (ref 74–124)
GLUCOSE UR STRIP-MCNC: NEGATIVE MG/DL
HCT VFR BLD AUTO: 37.8 % (ref 34–45)
HGB BLD-MCNC: 12.8 G/DL (ref 11.5–14.9)
HGB UR QL STRIP.AUTO: ABNORMAL
IMM GRANULOCYTES # BLD: 0.02 10*3/MM3 (ref 0–0.03)
IMM GRANULOCYTES NFR BLD: 0.6 % (ref 0–0.5)
KETONES UR QL STRIP: NEGATIVE
LEUKOCYTE ESTERASE UR QL STRIP.AUTO: ABNORMAL
LYMPHOCYTES # BLD AUTO: 1.38 10*3/MM3 (ref 1–3.5)
LYMPHOCYTES NFR BLD AUTO: 43 % (ref 20–49)
MCH RBC QN AUTO: 30.4 PG (ref 27–33)
MCHC RBC AUTO-ENTMCNC: 33.9 G/DL (ref 32–35)
MCV RBC AUTO: 89.8 FL (ref 83–97)
MONOCYTES # BLD AUTO: 0.39 10*3/MM3 (ref 0.25–0.8)
MONOCYTES NFR BLD AUTO: 12.1 % (ref 4–12)
NEUTROPHILS # BLD AUTO: 1.29 10*3/MM3 (ref 1.5–7)
NEUTROPHILS NFR BLD AUTO: 40.3 % (ref 39–75)
NITRITE UR QL STRIP: NEGATIVE
NRBC BLD MANUAL-RTO: 0 /100 WBC (ref 0–0)
PH UR STRIP.AUTO: 5.5 [PH] (ref 4.5–8)
PLATELET # BLD AUTO: 98 10*3/MM3 (ref 150–375)
PMV BLD AUTO: 9.2 FL (ref 8.9–12.1)
POTASSIUM BLD-SCNC: 4.3 MMOL/L (ref 3.5–4.7)
PROT SERPL-MCNC: 7.2 G/DL (ref 6.3–8)
PROT UR QL STRIP: NEGATIVE
RBC # BLD AUTO: 4.21 10*6/MM3 (ref 3.9–5)
SODIUM BLD-SCNC: 141 MMOL/L (ref 134–145)
SP GR UR STRIP: 1.01 (ref 1–1.03)
UROBILINOGEN UR QL STRIP: ABNORMAL
WBC NRBC COR # BLD: 3.21 10*3/MM3 (ref 4–10)

## 2017-10-30 PROCEDURE — 85025 COMPLETE CBC W/AUTO DIFF WBC: CPT

## 2017-10-30 PROCEDURE — 96417 CHEMO IV INFUS EACH ADDL SEQ: CPT | Performed by: INTERNAL MEDICINE

## 2017-10-30 PROCEDURE — 80053 COMPREHEN METABOLIC PANEL: CPT

## 2017-10-30 PROCEDURE — 96375 TX/PRO/DX INJ NEW DRUG ADDON: CPT | Performed by: INTERNAL MEDICINE

## 2017-10-30 PROCEDURE — 25010000002 BEVACIZUMAB PER 10 MG: Performed by: INTERNAL MEDICINE

## 2017-10-30 PROCEDURE — 25010000002 FLUOROURACIL PER 500 MG: Performed by: INTERNAL MEDICINE

## 2017-10-30 PROCEDURE — 25010000002 PALONOSETRON PER 25 MCG: Performed by: INTERNAL MEDICINE

## 2017-10-30 PROCEDURE — 96416 CHEMO PROLONG INFUSE W/PUMP: CPT | Performed by: INTERNAL MEDICINE

## 2017-10-30 PROCEDURE — 25010000002 LEUCOVORIN CALCIUM PER 50 MG: Performed by: INTERNAL MEDICINE

## 2017-10-30 PROCEDURE — 96413 CHEMO IV INFUSION 1 HR: CPT | Performed by: INTERNAL MEDICINE

## 2017-10-30 PROCEDURE — 81003 URINALYSIS AUTO W/O SCOPE: CPT

## 2017-10-30 PROCEDURE — 25010000002 OXALIPLATIN PER 0.5 MG: Performed by: INTERNAL MEDICINE

## 2017-10-30 PROCEDURE — 25010000002 DEXAMETHASONE PER 1 MG: Performed by: INTERNAL MEDICINE

## 2017-10-30 PROCEDURE — 96368 THER/DIAG CONCURRENT INF: CPT | Performed by: INTERNAL MEDICINE

## 2017-10-30 PROCEDURE — 96415 CHEMO IV INFUSION ADDL HR: CPT | Performed by: INTERNAL MEDICINE

## 2017-10-30 RX ORDER — HYDROCODONE BITARTRATE AND ACETAMINOPHEN 10; 325 MG/1; MG/1
1 TABLET ORAL EVERY 6 HOURS PRN
Qty: 120 TABLET | Refills: 0 | Status: SHIPPED | OUTPATIENT
Start: 2017-10-30 | End: 2017-10-30 | Stop reason: SDUPTHER

## 2017-10-30 RX ORDER — HYDROCODONE BITARTRATE AND ACETAMINOPHEN 10; 325 MG/1; MG/1
1 TABLET ORAL EVERY 6 HOURS PRN
Qty: 120 TABLET | Refills: 0 | Status: SHIPPED | OUTPATIENT
Start: 2017-10-30 | End: 2018-01-01 | Stop reason: SDUPTHER

## 2017-10-30 RX ORDER — SODIUM CHLORIDE 9 MG/ML
250 INJECTION, SOLUTION INTRAVENOUS ONCE
Status: COMPLETED | OUTPATIENT
Start: 2017-10-30 | End: 2017-10-30

## 2017-10-30 RX ORDER — PALONOSETRON 0.05 MG/ML
0.25 INJECTION, SOLUTION INTRAVENOUS ONCE
Status: COMPLETED | OUTPATIENT
Start: 2017-10-30 | End: 2017-10-30

## 2017-10-30 RX ORDER — CLONAZEPAM 1 MG/1
1 TABLET ORAL 2 TIMES DAILY PRN
Qty: 60 TABLET | Refills: 0 | Status: SHIPPED | OUTPATIENT
Start: 2017-10-30 | End: 2018-01-01 | Stop reason: SDUPTHER

## 2017-10-30 RX ORDER — DEXTROSE MONOHYDRATE 50 MG/ML
250 INJECTION, SOLUTION INTRAVENOUS ONCE
Status: COMPLETED | OUTPATIENT
Start: 2017-10-30 | End: 2017-10-30

## 2017-10-30 RX ADMIN — LEUCOVORIN CALCIUM 540 MG: 350 INJECTION, POWDER, LYOPHILIZED, FOR SOLUTION INTRAMUSCULAR; INTRAVENOUS at 10:58

## 2017-10-30 RX ADMIN — FLUOROURACIL 2900 MG: 50 INJECTION, SOLUTION INTRAVENOUS at 13:03

## 2017-10-30 RX ADMIN — DEXTROSE MONOHYDRATE 250 ML: 50 INJECTION, SOLUTION INTRAVENOUS at 10:31

## 2017-10-30 RX ADMIN — DEXAMETHASONE SODIUM PHOSPHATE 12 MG: 10 INJECTION INTRAMUSCULAR; INTRAVENOUS at 09:24

## 2017-10-30 RX ADMIN — BEVACIZUMAB 370 MG: 400 INJECTION, SOLUTION INTRAVENOUS at 09:53

## 2017-10-30 RX ADMIN — SODIUM CHLORIDE 250 ML: 900 INJECTION, SOLUTION INTRAVENOUS at 09:24

## 2017-10-30 RX ADMIN — PALONOSETRON HYDROCHLORIDE 0.25 MG: 0.25 INJECTION INTRAVENOUS at 09:24

## 2017-10-30 RX ADMIN — OXALIPLATIN 115 MG: 100 INJECTION, SOLUTION INTRAVENOUS at 10:59

## 2017-10-30 NOTE — TELEPHONE ENCOUNTER
[facility] calling stating that the prescriptions that were provided today were not signed by the same ordering MD.  Dr Paez was printed on the script but Dr Ruvalcaba signed the script.  Verbal order given for klonopin, lortab re-printed and signed per Dr Ruvalcaba(#2)  I called and spoke with pt's son to inform him that his mother would need to  the new script on Wednesday when she came back to be disconnected, he v/u.  SCRIPT IN TRIAGE TO BE PICKED UP WEDNESDAY

## 2017-10-30 NOTE — TELEPHONE ENCOUNTER
Pharmacy calling to clarify if okay to fill clonazepam.  She has prescriptions for both lorazepam and clonazepam.  In reviewing Dr Paez's note, clonazepam being used for insomnia and lorazepam utilized for anxiety.  Okay to fill today's script

## 2017-11-01 ENCOUNTER — INFUSION (OUTPATIENT)
Dept: ONCOLOGY | Facility: HOSPITAL | Age: 57
End: 2017-11-01

## 2017-11-01 DIAGNOSIS — Z45.2 FITTING AND ADJUSTMENT OF VASCULAR CATHETER: Primary | ICD-10-CM

## 2017-11-01 DIAGNOSIS — C20 MALIGNANT NEOPLASM OF RECTUM (HCC): ICD-10-CM

## 2017-11-01 PROCEDURE — 25010000002 HEPARIN FLUSH (PORCINE) 100 UNIT/ML SOLUTION: Performed by: INTERNAL MEDICINE

## 2017-11-01 PROCEDURE — 96523 IRRIG DRUG DELIVERY DEVICE: CPT | Performed by: INTERNAL MEDICINE

## 2017-11-01 RX ORDER — SODIUM CHLORIDE 0.9 % (FLUSH) 0.9 %
10 SYRINGE (ML) INJECTION AS NEEDED
Status: DISCONTINUED | OUTPATIENT
Start: 2017-11-01 | End: 2017-11-01 | Stop reason: HOSPADM

## 2017-11-01 RX ORDER — SODIUM CHLORIDE 0.9 % (FLUSH) 0.9 %
10 SYRINGE (ML) INJECTION AS NEEDED
Status: CANCELLED | OUTPATIENT
Start: 2017-11-01

## 2017-11-01 RX ADMIN — Medication 10 ML: at 11:11

## 2017-11-01 RX ADMIN — SODIUM CHLORIDE, PRESERVATIVE FREE 500 UNITS: 5 INJECTION INTRAVENOUS at 11:11

## 2017-11-02 ENCOUNTER — INFUSION (OUTPATIENT)
Dept: ONCOLOGY | Facility: HOSPITAL | Age: 57
End: 2017-11-02

## 2017-11-02 VITALS — BODY MASS INDEX: 29.44 KG/M2 | WEIGHT: 167.2 LBS

## 2017-11-02 DIAGNOSIS — C20 MALIGNANT NEOPLASM OF RECTUM (HCC): Primary | ICD-10-CM

## 2017-11-02 PROCEDURE — 96372 THER/PROPH/DIAG INJ SC/IM: CPT | Performed by: INTERNAL MEDICINE

## 2017-11-02 PROCEDURE — 25010000002 TBO-FILGRASTIM 480 MCG/0.8ML SOLUTION PREFILLED SYRINGE: Performed by: INTERNAL MEDICINE

## 2017-11-02 RX ADMIN — TBO-FILGRASTIM 480 MCG: 480 INJECTION, SOLUTION SUBCUTANEOUS at 14:56

## 2017-11-02 NOTE — PROGRESS NOTES
Per Dr. Paez, pt is to have granix days 4-6 after each treatment. Pt not scheduled for day 6 which is Saturday 11/4/17. Called [facility] and scheduled for Saturday 11/4/17 at 3pm on 3P.

## 2017-11-03 ENCOUNTER — INFUSION (OUTPATIENT)
Dept: ONCOLOGY | Facility: HOSPITAL | Age: 57
End: 2017-11-03

## 2017-11-03 DIAGNOSIS — C20 MALIGNANT NEOPLASM OF RECTUM (HCC): Primary | ICD-10-CM

## 2017-11-03 PROCEDURE — 96372 THER/PROPH/DIAG INJ SC/IM: CPT | Performed by: INTERNAL MEDICINE

## 2017-11-03 PROCEDURE — 25010000002 TBO-FILGRASTIM 480 MCG/0.8ML SOLUTION PREFILLED SYRINGE: Performed by: INTERNAL MEDICINE

## 2017-11-03 RX ADMIN — TBO-FILGRASTIM 480 MCG: 480 INJECTION, SOLUTION SUBCUTANEOUS at 16:15

## 2017-11-04 ENCOUNTER — INFUSION (OUTPATIENT)
Dept: ONCOLOGY | Facility: HOSPITAL | Age: 57
End: 2017-11-04

## 2017-11-04 VITALS
SYSTOLIC BLOOD PRESSURE: 149 MMHG | RESPIRATION RATE: 16 BRPM | OXYGEN SATURATION: 97 % | HEART RATE: 94 BPM | DIASTOLIC BLOOD PRESSURE: 81 MMHG | TEMPERATURE: 96.7 F

## 2017-11-04 DIAGNOSIS — C20 MALIGNANT NEOPLASM OF RECTUM (HCC): Primary | ICD-10-CM

## 2017-11-04 PROCEDURE — 96372 THER/PROPH/DIAG INJ SC/IM: CPT

## 2017-11-04 PROCEDURE — 25010000002 TBO-FILGRASTIM 480 MCG/0.8ML SOLUTION PREFILLED SYRINGE: Performed by: INTERNAL MEDICINE

## 2017-11-04 RX ADMIN — TBO-FILGRASTIM 480 MCG: 480 INJECTION, SOLUTION SUBCUTANEOUS at 15:09

## 2017-11-07 DIAGNOSIS — C20 MALIGNANT NEOPLASM OF RECTUM (HCC): ICD-10-CM

## 2017-11-13 ENCOUNTER — APPOINTMENT (OUTPATIENT)
Dept: ONCOLOGY | Facility: HOSPITAL | Age: 57
End: 2017-11-13

## 2017-11-13 ENCOUNTER — APPOINTMENT (OUTPATIENT)
Dept: ONCOLOGY | Facility: CLINIC | Age: 57
End: 2017-11-13

## 2017-11-20 NOTE — PROGRESS NOTES
Pt scheduled in [facility] treatment room today for Neupogen injection, day 6 of chemotherapy treatment plan. Pt arrived as scheduled; when orders were released, Day #6 was dated for 6/14 instead of today. Dr. Paez paged to clarify orders; Dr. Lopez returned call and gave OK to administer today. Pt very upset regarding the delay while clarifying the orders; left immediately after the injection was given.  present throughout visit, and pt did repeat back to this RN her upcoming appointments regarding her treatment plan. Refused AVS.     
1

## 2017-12-08 ENCOUNTER — TELEPHONE (OUTPATIENT)
Dept: ONCOLOGY | Facility: HOSPITAL | Age: 57
End: 2017-12-08

## 2017-12-08 ENCOUNTER — TELEPHONE (OUTPATIENT)
Dept: GENERAL RADIOLOGY | Facility: HOSPITAL | Age: 57
End: 2017-12-08

## 2017-12-08 NOTE — TELEPHONE ENCOUNTER
Attempted to call daughter to discuss f/u with Dr Paez, no answer, message left to call the office

## 2017-12-08 NOTE — TELEPHONE ENCOUNTER
Daughter calling asking questions about her mother's port, she states her mother has decided she doesn't want any more chemo.  They are unsure if they should go ahead and get the port removed.  I informed her that we could at least get her port flushed until final decision is made on the port.  The daughter is also asking about medications, she is wondering if Dr Paez will continue to order these or will they need to get them from her PCP.   Message sent to scheduling desk to get port flush set up and message sent to Dr Paez

## 2017-12-08 NOTE — TELEPHONE ENCOUNTER
----- Message from Jacquie Tabor RN sent at 12/8/2017  1:57 PM EST -----  Please schedule pt for port flush Monday 12-11.  If possible around 10am as she is coming from another MD visit. thanks

## 2017-12-08 NOTE — TELEPHONE ENCOUNTER
----- Message from Pilar Paez MD sent at 12/8/2017  2:10 PM EST -----  Please let her know that I would be glad to fill her medications if she wants to continue to follow in our clinic. Will you forward to scheduling if she wants to see us?  Thanks,  Pilar  ----- Message -----     From: Jacquie Tabor RN     Sent: 12/8/2017   2:00 PM       To: Pilar Paez MD    Daughter calling asking questions about her mother's port, she states her mother has decided she doesn't want any more chemo.  They are unsure if they should go ahead and get the port removed?  I informed her that we could at least get her port flushed until final decision is made on the port.  She also wants to know if you will continue to order her medications or will these need to go through her PCP?  They cancelled last visit with you, do yo want to f/u with her in the office? Please advise. thanks

## 2017-12-11 ENCOUNTER — TELEPHONE (OUTPATIENT)
Dept: ONCOLOGY | Facility: HOSPITAL | Age: 57
End: 2017-12-11

## 2017-12-11 ENCOUNTER — INFUSION (OUTPATIENT)
Dept: ONCOLOGY | Facility: HOSPITAL | Age: 57
End: 2017-12-11

## 2017-12-11 DIAGNOSIS — Z45.2 FITTING AND ADJUSTMENT OF VASCULAR CATHETER: Primary | ICD-10-CM

## 2017-12-11 PROCEDURE — 25010000002 HEPARIN FLUSH (PORCINE) 100 UNIT/ML SOLUTION: Performed by: INTERNAL MEDICINE

## 2017-12-11 PROCEDURE — 96523 IRRIG DRUG DELIVERY DEVICE: CPT | Performed by: INTERNAL MEDICINE

## 2017-12-11 RX ORDER — SODIUM CHLORIDE 0.9 % (FLUSH) 0.9 %
10 SYRINGE (ML) INJECTION AS NEEDED
Status: CANCELLED | OUTPATIENT
Start: 2017-12-11

## 2017-12-11 RX ORDER — SODIUM CHLORIDE 0.9 % (FLUSH) 0.9 %
10 SYRINGE (ML) INJECTION AS NEEDED
Status: DISCONTINUED | OUTPATIENT
Start: 2017-12-11 | End: 2017-12-11 | Stop reason: HOSPADM

## 2017-12-11 RX ADMIN — Medication 10 ML: at 10:42

## 2017-12-11 RX ADMIN — SODIUM CHLORIDE, PRESERVATIVE FREE 500 UNITS: 5 INJECTION INTRAVENOUS at 10:42

## 2017-12-11 NOTE — TELEPHONE ENCOUNTER
Daughter called and said they are not planning to come back here anymore and plan to f/u with PCP from now on.

## 2018-01-01 ENCOUNTER — INFUSION (OUTPATIENT)
Dept: ONCOLOGY | Facility: HOSPITAL | Age: 58
End: 2018-01-01

## 2018-01-01 ENCOUNTER — DOCUMENTATION (OUTPATIENT)
Dept: ONCOLOGY | Facility: CLINIC | Age: 58
End: 2018-01-01

## 2018-01-01 ENCOUNTER — APPOINTMENT (OUTPATIENT)
Dept: RADIATION ONCOLOGY | Facility: HOSPITAL | Age: 58
End: 2018-01-01

## 2018-01-01 ENCOUNTER — OFFICE VISIT (OUTPATIENT)
Dept: RADIATION ONCOLOGY | Facility: HOSPITAL | Age: 58
End: 2018-01-01

## 2018-01-01 ENCOUNTER — RADIATION ONCOLOGY WEEKLY ASSESSMENT (OUTPATIENT)
Dept: RADIATION ONCOLOGY | Facility: HOSPITAL | Age: 58
End: 2018-01-01

## 2018-01-01 ENCOUNTER — TELEPHONE (OUTPATIENT)
Dept: ONCOLOGY | Facility: HOSPITAL | Age: 58
End: 2018-01-01

## 2018-01-01 ENCOUNTER — OFFICE VISIT (OUTPATIENT)
Dept: ONCOLOGY | Facility: CLINIC | Age: 58
End: 2018-01-01

## 2018-01-01 ENCOUNTER — APPOINTMENT (OUTPATIENT)
Dept: ONCOLOGY | Facility: HOSPITAL | Age: 58
End: 2018-01-01

## 2018-01-01 ENCOUNTER — HOSPITAL ENCOUNTER (OUTPATIENT)
Dept: PET IMAGING | Facility: HOSPITAL | Age: 58
Discharge: HOME | End: 2018-02-12
Attending: COLON & RECTAL SURGERY

## 2018-01-01 ENCOUNTER — TELEPHONE (OUTPATIENT)
Dept: ONCOLOGY | Facility: CLINIC | Age: 58
End: 2018-01-01

## 2018-01-01 ENCOUNTER — HOSPITAL ENCOUNTER (EMERGENCY)
Facility: HOSPITAL | Age: 58
Discharge: HOME | End: 2018-07-13
Attending: EMERGENCY MEDICINE | Admitting: EMERGENCY MEDICINE

## 2018-01-01 ENCOUNTER — APPOINTMENT (OUTPATIENT)
Dept: LAB | Facility: HOSPITAL | Age: 58
End: 2018-01-01

## 2018-01-01 ENCOUNTER — HOSPITAL ENCOUNTER (OUTPATIENT)
Dept: PET IMAGING | Facility: HOSPITAL | Age: 58
Discharge: HOME | End: 2018-02-12
Attending: COLON & RECTAL SURGERY | Admitting: COLON & RECTAL SURGERY

## 2018-01-01 ENCOUNTER — APPOINTMENT (OUTPATIENT)
Dept: CT IMAGING | Facility: HOSPITAL | Age: 58
End: 2018-01-01

## 2018-01-01 ENCOUNTER — DOCUMENTATION (OUTPATIENT)
Dept: RADIATION ONCOLOGY | Facility: HOSPITAL | Age: 58
End: 2018-01-01

## 2018-01-01 ENCOUNTER — CONSULT (OUTPATIENT)
Dept: RADIATION ONCOLOGY | Facility: HOSPITAL | Age: 58
End: 2018-01-01

## 2018-01-01 ENCOUNTER — DOCUMENTATION (OUTPATIENT)
Dept: ONCOLOGY | Facility: HOSPITAL | Age: 58
End: 2018-01-01

## 2018-01-01 VITALS
BODY MASS INDEX: 29.83 KG/M2 | RESPIRATION RATE: 16 BRPM | OXYGEN SATURATION: 94 % | WEIGHT: 176 LBS | HEART RATE: 87 BPM | TEMPERATURE: 98.2 F | DIASTOLIC BLOOD PRESSURE: 67 MMHG | SYSTOLIC BLOOD PRESSURE: 126 MMHG

## 2018-01-01 VITALS
HEART RATE: 108 BPM | DIASTOLIC BLOOD PRESSURE: 86 MMHG | WEIGHT: 173.2 LBS | OXYGEN SATURATION: 97 % | HEIGHT: 66 IN | SYSTOLIC BLOOD PRESSURE: 126 MMHG | BODY MASS INDEX: 27.83 KG/M2 | TEMPERATURE: 98.6 F | RESPIRATION RATE: 16 BRPM

## 2018-01-01 VITALS
RESPIRATION RATE: 12 BRPM | TEMPERATURE: 98.5 F | SYSTOLIC BLOOD PRESSURE: 122 MMHG | OXYGEN SATURATION: 98 % | BODY MASS INDEX: 29.78 KG/M2 | WEIGHT: 174.4 LBS | HEIGHT: 64 IN | DIASTOLIC BLOOD PRESSURE: 78 MMHG | HEART RATE: 71 BPM

## 2018-01-01 VITALS
TEMPERATURE: 96.9 F | OXYGEN SATURATION: 99 % | BODY MASS INDEX: 29.83 KG/M2 | DIASTOLIC BLOOD PRESSURE: 72 MMHG | HEART RATE: 93 BPM | SYSTOLIC BLOOD PRESSURE: 104 MMHG | RESPIRATION RATE: 16 BRPM | WEIGHT: 176 LBS

## 2018-01-01 VITALS
OXYGEN SATURATION: 97 % | RESPIRATION RATE: 16 BRPM | DIASTOLIC BLOOD PRESSURE: 84 MMHG | TEMPERATURE: 98.8 F | BODY MASS INDEX: 28.32 KG/M2 | SYSTOLIC BLOOD PRESSURE: 126 MMHG | WEIGHT: 176.2 LBS | HEART RATE: 92 BPM | HEIGHT: 66 IN

## 2018-01-01 VITALS
WEIGHT: 168 LBS | RESPIRATION RATE: 16 BRPM | BODY MASS INDEX: 27 KG/M2 | DIASTOLIC BLOOD PRESSURE: 77 MMHG | TEMPERATURE: 98.7 F | HEART RATE: 79 BPM | SYSTOLIC BLOOD PRESSURE: 110 MMHG | OXYGEN SATURATION: 94 % | HEIGHT: 66 IN

## 2018-01-01 VITALS
WEIGHT: 175.93 LBS | BODY MASS INDEX: 29.82 KG/M2 | SYSTOLIC BLOOD PRESSURE: 125 MMHG | RESPIRATION RATE: 16 BRPM | OXYGEN SATURATION: 97 % | TEMPERATURE: 97.7 F | DIASTOLIC BLOOD PRESSURE: 80 MMHG | HEART RATE: 109 BPM

## 2018-01-01 VITALS
SYSTOLIC BLOOD PRESSURE: 122 MMHG | BODY MASS INDEX: 29.6 KG/M2 | RESPIRATION RATE: 18 BRPM | OXYGEN SATURATION: 96 % | DIASTOLIC BLOOD PRESSURE: 92 MMHG | WEIGHT: 173.4 LBS | HEIGHT: 64 IN | HEART RATE: 101 BPM | TEMPERATURE: 98.6 F

## 2018-01-01 VITALS
DIASTOLIC BLOOD PRESSURE: 92 MMHG | HEIGHT: 66 IN | SYSTOLIC BLOOD PRESSURE: 134 MMHG | WEIGHT: 175.8 LBS | TEMPERATURE: 97.9 F | BODY MASS INDEX: 28.25 KG/M2 | OXYGEN SATURATION: 99 % | RESPIRATION RATE: 16 BRPM | HEART RATE: 86 BPM

## 2018-01-01 VITALS
HEIGHT: 66 IN | OXYGEN SATURATION: 93 % | BODY MASS INDEX: 27.03 KG/M2 | SYSTOLIC BLOOD PRESSURE: 134 MMHG | HEART RATE: 94 BPM | RESPIRATION RATE: 16 BRPM | DIASTOLIC BLOOD PRESSURE: 79 MMHG | WEIGHT: 168.2 LBS | TEMPERATURE: 98 F

## 2018-01-01 VITALS
BODY MASS INDEX: 28.92 KG/M2 | DIASTOLIC BLOOD PRESSURE: 76 MMHG | WEIGHT: 169.4 LBS | OXYGEN SATURATION: 99 % | HEIGHT: 64 IN | HEART RATE: 79 BPM | TEMPERATURE: 97.7 F | RESPIRATION RATE: 16 BRPM | SYSTOLIC BLOOD PRESSURE: 124 MMHG

## 2018-01-01 VITALS
SYSTOLIC BLOOD PRESSURE: 131 MMHG | HEART RATE: 106 BPM | RESPIRATION RATE: 16 BRPM | OXYGEN SATURATION: 96 % | DIASTOLIC BLOOD PRESSURE: 83 MMHG | TEMPERATURE: 97.5 F

## 2018-01-01 DIAGNOSIS — C20 MALIGNANT NEOPLASM OF RECTUM (HCC): Primary | ICD-10-CM

## 2018-01-01 DIAGNOSIS — Z45.2 FITTING AND ADJUSTMENT OF VASCULAR CATHETER: Primary | ICD-10-CM

## 2018-01-01 DIAGNOSIS — N13.30 HYDRONEPHROSIS, UNSPECIFIED HYDRONEPHROSIS TYPE: ICD-10-CM

## 2018-01-01 DIAGNOSIS — G89.3 CANCER ASSOCIATED PAIN: ICD-10-CM

## 2018-01-01 DIAGNOSIS — Z51.5 PALLIATIVE CARE ENCOUNTER: ICD-10-CM

## 2018-01-01 DIAGNOSIS — C79.9 METASTASIS FROM RECTAL CANCER (HCC): Primary | ICD-10-CM

## 2018-01-01 DIAGNOSIS — Z45.2 FITTING AND ADJUSTMENT OF VASCULAR CATHETER: ICD-10-CM

## 2018-01-01 DIAGNOSIS — C20 METASTASIS FROM RECTAL CANCER (HCC): Primary | ICD-10-CM

## 2018-01-01 DIAGNOSIS — C79.9 METASTASIS FROM RECTAL CANCER (HCC): ICD-10-CM

## 2018-01-01 DIAGNOSIS — C20 MALIGNANT NEOPLASM OF RECTUM (HCC): ICD-10-CM

## 2018-01-01 DIAGNOSIS — R30.0 DYSURIA: ICD-10-CM

## 2018-01-01 DIAGNOSIS — C20 METASTASIS FROM RECTAL CANCER (HCC): ICD-10-CM

## 2018-01-01 LAB
ALBUMIN SERPL-MCNC: 4.5 G/DL (ref 3.5–5.2)
ALBUMIN/GLOB SERPL: 1.4 G/DL
ALP SERPL-CCNC: 89 U/L (ref 39–117)
ALT SERPL W P-5'-P-CCNC: 34 U/L (ref 1–33)
ANION GAP SERPL CALCULATED.3IONS-SCNC: 10.7 MMOL/L
AST SERPL-CCNC: 41 U/L (ref 1–32)
BACTERIA UR QL AUTO: NORMAL /HPF
BASOPHILS # BLD AUTO: 0.02 10*3/MM3 (ref 0–0.1)
BASOPHILS # BLD AUTO: 0.02 10*3/MM3 (ref 0–0.2)
BASOPHILS # BLD AUTO: 0.03 10*3/MM3 (ref 0–0.1)
BASOPHILS NFR BLD AUTO: 0.4 % (ref 0–1.1)
BASOPHILS NFR BLD AUTO: 0.4 % (ref 0–1.1)
BASOPHILS NFR BLD AUTO: 0.5 % (ref 0–1.1)
BASOPHILS NFR BLD AUTO: 0.5 % (ref 0–1.5)
BASOPHILS NFR BLD AUTO: 0.7 % (ref 0–1.1)
BILIRUB SERPL-MCNC: 0.4 MG/DL (ref 0.1–1.2)
BILIRUB UR QL STRIP: NEGATIVE
BUN BLD-MCNC: 11 MG/DL (ref 6–20)
BUN/CREAT SERPL: 15.5 (ref 7–25)
CALCIUM SPEC-SCNC: 9.8 MG/DL (ref 8.6–10.5)
CHLORIDE SERPL-SCNC: 103 MMOL/L (ref 98–107)
CLARITY UR: CLEAR
CO2 SERPL-SCNC: 28.3 MMOL/L (ref 22–29)
COLOR UR: YELLOW
CREAT BLD-MCNC: 0.71 MG/DL (ref 0.57–1)
DEPRECATED RDW RBC AUTO: 40.6 FL (ref 37–49)
DEPRECATED RDW RBC AUTO: 41.6 FL (ref 37–49)
DEPRECATED RDW RBC AUTO: 42.1 FL (ref 37–54)
DEPRECATED RDW RBC AUTO: 44 FL (ref 37–49)
DEPRECATED RDW RBC AUTO: 44.9 FL (ref 37–49)
EOSINOPHIL # BLD AUTO: 0.04 10*3/MM3 (ref 0–0.36)
EOSINOPHIL # BLD AUTO: 0.06 10*3/MM3 (ref 0–0.7)
EOSINOPHIL # BLD AUTO: 0.07 10*3/MM3 (ref 0–0.36)
EOSINOPHIL # BLD AUTO: 0.07 10*3/MM3 (ref 0–0.36)
EOSINOPHIL # BLD AUTO: 0.09 10*3/MM3 (ref 0–0.36)
EOSINOPHIL NFR BLD AUTO: 0.9 % (ref 1–5)
EOSINOPHIL NFR BLD AUTO: 1.4 % (ref 1–5)
EOSINOPHIL NFR BLD AUTO: 1.5 % (ref 0.3–6.2)
EOSINOPHIL NFR BLD AUTO: 1.7 % (ref 1–5)
EOSINOPHIL NFR BLD AUTO: 2.5 % (ref 1–5)
ERYTHROCYTE [DISTWIDTH] IN BLOOD BY AUTOMATED COUNT: 13.8 % (ref 11.7–14.5)
ERYTHROCYTE [DISTWIDTH] IN BLOOD BY AUTOMATED COUNT: 14 % (ref 11.7–13)
ERYTHROCYTE [DISTWIDTH] IN BLOOD BY AUTOMATED COUNT: 14.2 % (ref 11.7–14.5)
ERYTHROCYTE [DISTWIDTH] IN BLOOD BY AUTOMATED COUNT: 14.6 % (ref 11.7–14.5)
ERYTHROCYTE [DISTWIDTH] IN BLOOD BY AUTOMATED COUNT: 14.6 % (ref 11.7–14.5)
GFR SERPL CREATININE-BSD FRML MDRD: 85 ML/MIN/1.73
GLOBULIN UR ELPH-MCNC: 3.3 GM/DL
GLUCOSE BLD-MCNC: 105 MG/DL (ref 65–99)
GLUCOSE BLDC GLUCOMTR-MCNC: 104 MG/DL (ref 70–130)
GLUCOSE UR STRIP-MCNC: NEGATIVE MG/DL
HCT VFR BLD AUTO: 32 % (ref 34–45)
HCT VFR BLD AUTO: 33.4 % (ref 34–45)
HCT VFR BLD AUTO: 36.4 % (ref 34–45)
HCT VFR BLD AUTO: 37.1 % (ref 34–45)
HCT VFR BLD AUTO: 38.9 % (ref 35.6–45.5)
HGB BLD-MCNC: 10.2 G/DL (ref 11.5–14.9)
HGB BLD-MCNC: 10.6 G/DL (ref 11.5–14.9)
HGB BLD-MCNC: 12.1 G/DL (ref 11.5–14.9)
HGB BLD-MCNC: 12.5 G/DL (ref 11.9–15.5)
HGB BLD-MCNC: 12.8 G/DL (ref 11.5–14.9)
HGB UR QL STRIP.AUTO: ABNORMAL
HYALINE CASTS UR QL AUTO: NORMAL /LPF
IMM GRANULOCYTES # BLD: 0 10*3/MM3 (ref 0–0.03)
IMM GRANULOCYTES # BLD: 0.01 10*3/MM3 (ref 0–0.03)
IMM GRANULOCYTES # BLD: 0.01 10*3/MM3 (ref 0–0.03)
IMM GRANULOCYTES # BLD: 0.02 10*3/MM3 (ref 0–0.03)
IMM GRANULOCYTES # BLD: 0.02 10*3/MM3 (ref 0–0.03)
IMM GRANULOCYTES NFR BLD: 0 % (ref 0–0.5)
IMM GRANULOCYTES NFR BLD: 0.2 % (ref 0–0.5)
IMM GRANULOCYTES NFR BLD: 0.2 % (ref 0–0.5)
IMM GRANULOCYTES NFR BLD: 0.4 % (ref 0–0.5)
IMM GRANULOCYTES NFR BLD: 0.5 % (ref 0–0.5)
KETONES UR QL STRIP: NEGATIVE
LEUKOCYTE ESTERASE UR QL STRIP.AUTO: ABNORMAL
LIPASE SERPL-CCNC: 21 U/L (ref 13–60)
LYMPHOCYTES # BLD AUTO: 0.88 10*3/MM3 (ref 1–3.5)
LYMPHOCYTES # BLD AUTO: 0.91 10*3/MM3 (ref 1–3.5)
LYMPHOCYTES # BLD AUTO: 0.94 10*3/MM3 (ref 1–3.5)
LYMPHOCYTES # BLD AUTO: 0.97 10*3/MM3 (ref 0.9–4.8)
LYMPHOCYTES # BLD AUTO: 1.42 10*3/MM3 (ref 1–3.5)
LYMPHOCYTES NFR BLD AUTO: 19.1 % (ref 20–49)
LYMPHOCYTES NFR BLD AUTO: 19.3 % (ref 20–49)
LYMPHOCYTES NFR BLD AUTO: 24.5 % (ref 19.6–45.3)
LYMPHOCYTES NFR BLD AUTO: 24.9 % (ref 20–49)
LYMPHOCYTES NFR BLD AUTO: 34.4 % (ref 20–49)
MCH RBC QN AUTO: 25.9 PG (ref 27–33)
MCH RBC QN AUTO: 26 PG (ref 27–33)
MCH RBC QN AUTO: 26.7 PG (ref 26.9–32)
MCH RBC QN AUTO: 28.4 PG (ref 27–33)
MCH RBC QN AUTO: 29.1 PG (ref 27–33)
MCHC RBC AUTO-ENTMCNC: 31.7 G/DL (ref 32–35)
MCHC RBC AUTO-ENTMCNC: 31.9 G/DL (ref 32–35)
MCHC RBC AUTO-ENTMCNC: 32.1 G/DL (ref 32.4–36.3)
MCHC RBC AUTO-ENTMCNC: 33.2 G/DL (ref 32–35)
MCHC RBC AUTO-ENTMCNC: 34.5 G/DL (ref 32–35)
MCV RBC AUTO: 81.2 FL (ref 83–97)
MCV RBC AUTO: 81.9 FL (ref 83–97)
MCV RBC AUTO: 83.1 FL (ref 80.5–98.2)
MCV RBC AUTO: 84.3 FL (ref 83–97)
MCV RBC AUTO: 85.4 FL (ref 83–97)
MONOCYTES # BLD AUTO: 0.35 10*3/MM3 (ref 0.25–0.8)
MONOCYTES # BLD AUTO: 0.36 10*3/MM3 (ref 0.25–0.8)
MONOCYTES # BLD AUTO: 0.39 10*3/MM3 (ref 0.25–0.8)
MONOCYTES # BLD AUTO: 0.39 10*3/MM3 (ref 0.2–1.2)
MONOCYTES # BLD AUTO: 0.4 10*3/MM3 (ref 0.25–0.8)
MONOCYTES NFR BLD AUTO: 8 % (ref 4–12)
MONOCYTES NFR BLD AUTO: 8.5 % (ref 4–12)
MONOCYTES NFR BLD AUTO: 8.7 % (ref 4–12)
MONOCYTES NFR BLD AUTO: 9.8 % (ref 4–12)
MONOCYTES NFR BLD AUTO: 9.8 % (ref 5–12)
NEUTROPHILS # BLD AUTO: 2.25 10*3/MM3 (ref 1.5–7)
NEUTROPHILS # BLD AUTO: 2.26 10*3/MM3 (ref 1.5–7)
NEUTROPHILS # BLD AUTO: 2.52 10*3/MM3 (ref 1.9–8.1)
NEUTROPHILS # BLD AUTO: 3.25 10*3/MM3 (ref 1.5–7)
NEUTROPHILS # BLD AUTO: 3.43 10*3/MM3 (ref 1.5–7)
NEUTROPHILS NFR BLD AUTO: 54.5 % (ref 39–75)
NEUTROPHILS NFR BLD AUTO: 61.8 % (ref 39–75)
NEUTROPHILS NFR BLD AUTO: 63.7 % (ref 42.7–76)
NEUTROPHILS NFR BLD AUTO: 70.5 % (ref 39–75)
NEUTROPHILS NFR BLD AUTO: 70.7 % (ref 39–75)
NITRITE UR QL STRIP: NEGATIVE
NRBC BLD MANUAL-RTO: 0 /100 WBC (ref 0–0)
PH UR STRIP.AUTO: 5.5 [PH] (ref 5–8)
PLATELET # BLD AUTO: 129 10*3/MM3 (ref 150–375)
PLATELET # BLD AUTO: 139 10*3/MM3 (ref 140–500)
PLATELET # BLD AUTO: 146 10*3/MM3 (ref 150–375)
PLATELET # BLD AUTO: 205 10*3/MM3 (ref 150–375)
PLATELET # BLD AUTO: 226 10*3/MM3 (ref 150–375)
PMV BLD AUTO: 8.8 FL (ref 8.9–12.1)
PMV BLD AUTO: 8.9 FL (ref 6–12)
PMV BLD AUTO: 8.9 FL (ref 8.9–12.1)
PMV BLD AUTO: 9.2 FL (ref 8.9–12.1)
PMV BLD AUTO: 9.3 FL (ref 8.9–12.1)
POTASSIUM BLD-SCNC: 4 MMOL/L (ref 3.5–5.2)
PROT SERPL-MCNC: 7.8 G/DL (ref 6–8.5)
PROT UR QL STRIP: NEGATIVE
RBC # BLD AUTO: 3.94 10*6/MM3 (ref 3.9–5)
RBC # BLD AUTO: 4.08 10*6/MM3 (ref 3.9–5)
RBC # BLD AUTO: 4.26 10*6/MM3 (ref 3.9–5)
RBC # BLD AUTO: 4.4 10*6/MM3 (ref 3.9–5)
RBC # BLD AUTO: 4.68 10*6/MM3 (ref 3.9–5.2)
RBC # UR: NORMAL /HPF
REF LAB TEST METHOD: NORMAL
SODIUM BLD-SCNC: 142 MMOL/L (ref 136–145)
SP GR UR STRIP: 1.01 (ref 1–1.03)
SQUAMOUS #/AREA URNS HPF: NORMAL /HPF
UROBILINOGEN UR QL STRIP: ABNORMAL
WBC NRBC COR # BLD: 3.66 10*3/MM3 (ref 4–10)
WBC NRBC COR # BLD: 3.96 10*3/MM3 (ref 4.5–10.7)
WBC NRBC COR # BLD: 4.13 10*3/MM3 (ref 4–10)
WBC NRBC COR # BLD: 4.6 10*3/MM3 (ref 4–10)
WBC NRBC COR # BLD: 4.87 10*3/MM3 (ref 4–10)
WBC UR QL AUTO: NORMAL /HPF

## 2018-01-01 PROCEDURE — 77387 GUIDANCE FOR RADJ TX DLVR: CPT | Performed by: RADIOLOGY

## 2018-01-01 PROCEDURE — 96375 TX/PRO/DX INJ NEW DRUG ADDON: CPT

## 2018-01-01 PROCEDURE — 77412 RADIATION TX DELIVERY LVL 3: CPT | Performed by: RADIOLOGY

## 2018-01-01 PROCEDURE — 77417 THER RADIOLOGY PORT IMAGE(S): CPT | Performed by: RADIOLOGY

## 2018-01-01 PROCEDURE — 77427 RADIATION TX MANAGEMENT X5: CPT | Performed by: RADIOLOGY

## 2018-01-01 PROCEDURE — 78815 PET IMAGE W/CT SKULL-THIGH: CPT

## 2018-01-01 PROCEDURE — 77295 3-D RADIOTHERAPY PLAN: CPT | Performed by: RADIOLOGY

## 2018-01-01 PROCEDURE — 96523 IRRIG DRUG DELIVERY DEVICE: CPT | Performed by: INTERNAL MEDICINE

## 2018-01-01 PROCEDURE — 85025 COMPLETE CBC W/AUTO DIFF WBC: CPT

## 2018-01-01 PROCEDURE — 99215 OFFICE O/P EST HI 40 MIN: CPT | Performed by: INTERNAL MEDICINE

## 2018-01-01 PROCEDURE — 77300 RADIATION THERAPY DOSE PLAN: CPT | Performed by: RADIOLOGY

## 2018-01-01 PROCEDURE — 25010000002 ONDANSETRON PER 1 MG: Performed by: EMERGENCY MEDICINE

## 2018-01-01 PROCEDURE — 99204 OFFICE O/P NEW MOD 45 MIN: CPT | Performed by: RADIOLOGY

## 2018-01-01 PROCEDURE — 99214 OFFICE O/P EST MOD 30 MIN: CPT | Performed by: INTERNAL MEDICINE

## 2018-01-01 PROCEDURE — 99213 OFFICE O/P EST LOW 20 MIN: CPT | Performed by: INTERNAL MEDICINE

## 2018-01-01 PROCEDURE — 85025 COMPLETE CBC W/AUTO DIFF WBC: CPT | Performed by: EMERGENCY MEDICINE

## 2018-01-01 PROCEDURE — 25010000002 HEPARIN FLUSH (PORCINE) 100 UNIT/ML SOLUTION: Performed by: INTERNAL MEDICINE

## 2018-01-01 PROCEDURE — 77370 RADIATION PHYSICS CONSULT: CPT | Performed by: RADIOLOGY

## 2018-01-01 PROCEDURE — 77334 RADIATION TREATMENT AID(S): CPT | Performed by: RADIOLOGY

## 2018-01-01 PROCEDURE — G0463 HOSPITAL OUTPT CLINIC VISIT: HCPCS | Performed by: RADIOLOGY

## 2018-01-01 PROCEDURE — 77336 RADIATION PHYSICS CONSULT: CPT | Performed by: RADIOLOGY

## 2018-01-01 PROCEDURE — 77332 RADIATION TREATMENT AID(S): CPT | Performed by: RADIOLOGY

## 2018-01-01 PROCEDURE — 81001 URINALYSIS AUTO W/SCOPE: CPT | Performed by: PHYSICIAN ASSISTANT

## 2018-01-01 PROCEDURE — 25010000002 HYDROMORPHONE PER 4 MG: Performed by: EMERGENCY MEDICINE

## 2018-01-01 PROCEDURE — A9552 F18 FDG: HCPCS | Performed by: COLON & RECTAL SURGERY

## 2018-01-01 PROCEDURE — 74177 CT ABD & PELVIS W/CONTRAST: CPT

## 2018-01-01 PROCEDURE — 77290 THER RAD SIMULAJ FIELD CPLX: CPT | Performed by: RADIOLOGY

## 2018-01-01 PROCEDURE — 25010000002 IOPAMIDOL 61 % SOLUTION: Performed by: EMERGENCY MEDICINE

## 2018-01-01 PROCEDURE — 0 FLUDEOXYGLUCOSE F18 SOLUTION: Performed by: COLON & RECTAL SURGERY

## 2018-01-01 PROCEDURE — 36591 DRAW BLOOD OFF VENOUS DEVICE: CPT | Performed by: INTERNAL MEDICINE

## 2018-01-01 PROCEDURE — 99283 EMERGENCY DEPT VISIT LOW MDM: CPT

## 2018-01-01 PROCEDURE — 83690 ASSAY OF LIPASE: CPT | Performed by: EMERGENCY MEDICINE

## 2018-01-01 PROCEDURE — 96376 TX/PRO/DX INJ SAME DRUG ADON: CPT

## 2018-01-01 PROCEDURE — 80053 COMPREHEN METABOLIC PANEL: CPT | Performed by: EMERGENCY MEDICINE

## 2018-01-01 PROCEDURE — 77263 THER RADIOLOGY TX PLNG CPLX: CPT | Performed by: RADIOLOGY

## 2018-01-01 PROCEDURE — 96374 THER/PROPH/DIAG INJ IV PUSH: CPT

## 2018-01-01 PROCEDURE — 82962 GLUCOSE BLOOD TEST: CPT

## 2018-01-01 PROCEDURE — 99214 OFFICE O/P EST MOD 30 MIN: CPT | Performed by: RADIOLOGY

## 2018-01-01 RX ORDER — SODIUM CHLORIDE 0.9 % (FLUSH) 0.9 %
10 SYRINGE (ML) INJECTION AS NEEDED
Status: CANCELLED | OUTPATIENT
Start: 2018-01-01

## 2018-01-01 RX ORDER — SODIUM CHLORIDE 0.9 % (FLUSH) 0.9 %
10 SYRINGE (ML) INJECTION AS NEEDED
Status: DISCONTINUED | OUTPATIENT
Start: 2018-01-01 | End: 2018-01-01 | Stop reason: HOSPADM

## 2018-01-01 RX ORDER — MORPHINE SULFATE 30 MG/1
30 TABLET, FILM COATED, EXTENDED RELEASE ORAL EVERY 8 HOURS
COMMUNITY
End: 2018-01-01 | Stop reason: SDUPTHER

## 2018-01-01 RX ORDER — MORPHINE SULFATE 30 MG/1
30 TABLET, FILM COATED, EXTENDED RELEASE ORAL EVERY 8 HOURS SCHEDULED
Qty: 90 TABLET | Refills: 0 | Status: SHIPPED | OUTPATIENT
Start: 2018-01-01 | End: 2018-01-01

## 2018-01-01 RX ORDER — MORPHINE SULFATE 30 MG/1
30 TABLET, FILM COATED, EXTENDED RELEASE ORAL EVERY 12 HOURS SCHEDULED
Qty: 60 TABLET | Refills: 0 | Status: SHIPPED | OUTPATIENT
Start: 2018-01-01 | End: 2018-01-01 | Stop reason: SDUPTHER

## 2018-01-01 RX ORDER — FENTANYL 25 UG/H
1 PATCH TRANSDERMAL
Qty: 10 PATCH | Refills: 0 | Status: SHIPPED | OUTPATIENT
Start: 2018-01-01 | End: 2018-01-01

## 2018-01-01 RX ORDER — LIDOCAINE 50 MG/G
1 PATCH TOPICAL EVERY 24 HOURS
Qty: 30 PATCH | Refills: 0 | Status: SHIPPED | OUTPATIENT
Start: 2018-01-01 | End: 2018-01-01

## 2018-01-01 RX ORDER — SULFAMETHOXAZOLE AND TRIMETHOPRIM 800; 160 MG/1; MG/1
1 TABLET ORAL 2 TIMES DAILY
Qty: 14 TABLET | Refills: 0 | Status: SHIPPED | OUTPATIENT
Start: 2018-01-01 | End: 2018-01-01

## 2018-01-01 RX ORDER — CLONAZEPAM 1 MG/1
1 TABLET ORAL 2 TIMES DAILY PRN
Qty: 60 TABLET | Refills: 2 | Status: SHIPPED | OUTPATIENT
Start: 2018-01-01 | End: 2018-01-01 | Stop reason: SDUPTHER

## 2018-01-01 RX ORDER — ONDANSETRON 2 MG/ML
4 INJECTION INTRAMUSCULAR; INTRAVENOUS ONCE
Status: COMPLETED | OUTPATIENT
Start: 2018-01-01 | End: 2018-01-01

## 2018-01-01 RX ORDER — HYDROCODONE BITARTRATE AND ACETAMINOPHEN 10; 325 MG/1; MG/1
TABLET ORAL
Qty: 180 TABLET | Refills: 0 | Status: SHIPPED | OUTPATIENT
Start: 2018-01-01 | End: 2018-01-01 | Stop reason: SDUPTHER

## 2018-01-01 RX ORDER — MORPHINE SULFATE 30 MG/1
30 TABLET, FILM COATED, EXTENDED RELEASE ORAL EVERY 8 HOURS SCHEDULED
Qty: 90 TABLET | Refills: 0 | Status: SHIPPED | OUTPATIENT
Start: 2018-01-01 | End: 2018-01-01 | Stop reason: SDUPTHER

## 2018-01-01 RX ORDER — MORPHINE SULFATE 30 MG/1
30 TABLET, FILM COATED, EXTENDED RELEASE ORAL EVERY 12 HOURS SCHEDULED
Qty: 60 TABLET | Refills: 0 | Status: SHIPPED | OUTPATIENT
Start: 2018-01-01 | End: 2019-01-01 | Stop reason: SDUPTHER

## 2018-01-01 RX ORDER — ZOLPIDEM TARTRATE 5 MG/1
5 TABLET ORAL NIGHTLY PRN
Qty: 30 TABLET | Refills: 2 | Status: SHIPPED | OUTPATIENT
Start: 2018-01-01 | End: 2018-01-01

## 2018-01-01 RX ORDER — HYDROMORPHONE HYDROCHLORIDE 1 MG/ML
0.5 INJECTION, SOLUTION INTRAMUSCULAR; INTRAVENOUS; SUBCUTANEOUS ONCE
Status: COMPLETED | OUTPATIENT
Start: 2018-01-01 | End: 2018-01-01

## 2018-01-01 RX ORDER — HYDROCODONE BITARTRATE AND ACETAMINOPHEN 10; 325 MG/1; MG/1
TABLET ORAL
COMMUNITY
Start: 2018-01-01 | End: 2018-01-01 | Stop reason: SDUPTHER

## 2018-01-01 RX ORDER — CLONAZEPAM 1 MG/1
1 TABLET ORAL 2 TIMES DAILY PRN
Qty: 60 TABLET | Refills: 2 | Status: SHIPPED | OUTPATIENT
Start: 2018-01-01

## 2018-01-01 RX ORDER — MORPHINE SULFATE 30 MG/1
TABLET, FILM COATED, EXTENDED RELEASE ORAL
COMMUNITY
Start: 2018-01-01 | End: 2018-01-01 | Stop reason: SDUPTHER

## 2018-01-01 RX ORDER — MORPHINE SULFATE 30 MG/1
30 TABLET, FILM COATED, EXTENDED RELEASE ORAL EVERY 12 HOURS SCHEDULED
Qty: 60 TABLET | Refills: 0 | Status: CANCELLED | OUTPATIENT
Start: 2018-01-01

## 2018-01-01 RX ORDER — HYDROCODONE BITARTRATE AND ACETAMINOPHEN 10; 325 MG/1; MG/1
TABLET ORAL
Qty: 180 TABLET | Refills: 0 | Status: SHIPPED | OUTPATIENT
Start: 2018-01-01 | End: 2019-01-01 | Stop reason: SDUPTHER

## 2018-01-01 RX ADMIN — SODIUM CHLORIDE, PRESERVATIVE FREE 500 UNITS: 5 INJECTION INTRAVENOUS at 17:03

## 2018-01-01 RX ADMIN — SODIUM CHLORIDE, PRESERVATIVE FREE 500 UNITS: 5 INJECTION INTRAVENOUS at 15:51

## 2018-01-01 RX ADMIN — IOPAMIDOL 85 ML: 612 INJECTION, SOLUTION INTRAVENOUS at 22:54

## 2018-01-01 RX ADMIN — Medication 10 ML: at 13:47

## 2018-01-01 RX ADMIN — Medication 10 ML: at 16:04

## 2018-01-01 RX ADMIN — SODIUM CHLORIDE, PRESERVATIVE FREE 500 UNITS: 5 INJECTION INTRAVENOUS at 13:47

## 2018-01-01 RX ADMIN — Medication 10 ML: at 17:03

## 2018-01-01 RX ADMIN — Medication 10 ML: at 14:52

## 2018-01-01 RX ADMIN — HYDROMORPHONE HYDROCHLORIDE 1 MG: 1 INJECTION, SOLUTION INTRAMUSCULAR; INTRAVENOUS; SUBCUTANEOUS at 22:28

## 2018-01-01 RX ADMIN — SODIUM CHLORIDE, PRESERVATIVE FREE 500 UNITS: 5 INJECTION INTRAVENOUS at 14:52

## 2018-01-01 RX ADMIN — Medication 10 ML: at 15:51

## 2018-01-01 RX ADMIN — SODIUM CHLORIDE, PRESERVATIVE FREE 500 UNITS: 5 INJECTION INTRAVENOUS at 15:05

## 2018-01-01 RX ADMIN — SODIUM CHLORIDE, PRESERVATIVE FREE 500 UNITS: 5 INJECTION INTRAVENOUS at 13:57

## 2018-01-01 RX ADMIN — SODIUM CHLORIDE, PRESERVATIVE FREE 500 UNITS: 5 INJECTION INTRAVENOUS at 16:04

## 2018-01-01 RX ADMIN — ONDANSETRON 4 MG: 2 INJECTION INTRAMUSCULAR; INTRAVENOUS at 22:28

## 2018-01-01 RX ADMIN — Medication 10 ML: at 15:04

## 2018-01-01 RX ADMIN — Medication 10 ML: at 13:57

## 2018-01-01 RX ADMIN — HYDROMORPHONE HYDROCHLORIDE 0.5 MG: 1 INJECTION, SOLUTION INTRAMUSCULAR; INTRAVENOUS; SUBCUTANEOUS at 01:01

## 2018-01-01 RX ADMIN — FLUDEOXYGLUCOSE F18 1 DOSE: 300 INJECTION INTRAVENOUS at 10:48

## 2018-01-15 ENCOUNTER — TELEPHONE (OUTPATIENT)
Dept: ONCOLOGY | Facility: HOSPITAL | Age: 58
End: 2018-01-15

## 2018-01-18 ENCOUNTER — INFUSION (OUTPATIENT)
Dept: ONCOLOGY | Facility: HOSPITAL | Age: 58
End: 2018-01-18

## 2018-01-18 DIAGNOSIS — Z45.2 FITTING AND ADJUSTMENT OF VASCULAR CATHETER: Primary | ICD-10-CM

## 2018-01-18 PROCEDURE — 96523 IRRIG DRUG DELIVERY DEVICE: CPT | Performed by: INTERNAL MEDICINE

## 2018-01-18 PROCEDURE — 25010000002 HEPARIN FLUSH (PORCINE) 100 UNIT/ML SOLUTION: Performed by: INTERNAL MEDICINE

## 2018-01-18 RX ORDER — SODIUM CHLORIDE 0.9 % (FLUSH) 0.9 %
10 SYRINGE (ML) INJECTION AS NEEDED
Status: CANCELLED | OUTPATIENT
Start: 2018-01-18

## 2018-01-18 RX ORDER — SODIUM CHLORIDE 0.9 % (FLUSH) 0.9 %
10 SYRINGE (ML) INJECTION AS NEEDED
Status: DISCONTINUED | OUTPATIENT
Start: 2018-01-18 | End: 2018-01-18 | Stop reason: HOSPADM

## 2018-01-18 RX ADMIN — SODIUM CHLORIDE, PRESERVATIVE FREE 500 UNITS: 5 INJECTION INTRAVENOUS at 14:56

## 2018-01-18 RX ADMIN — Medication 10 ML: at 14:56

## 2018-01-24 ENCOUNTER — TRANSCRIBE ORDERS (OUTPATIENT)
Dept: ADMINISTRATIVE | Facility: HOSPITAL | Age: 58
End: 2018-01-24

## 2018-01-24 DIAGNOSIS — Z85.118 HISTORY OF ADENOCARCINOMA OF LUNG: ICD-10-CM

## 2018-01-24 DIAGNOSIS — R10.9 ABDOMINAL PAIN, UNSPECIFIED ABDOMINAL LOCATION: Primary | ICD-10-CM

## 2018-01-25 RX ORDER — MELOXICAM 15 MG/1
15 TABLET ORAL DAILY
COMMUNITY
End: 2018-01-01

## 2018-01-25 RX ORDER — SIMVASTATIN 40 MG
40 TABLET ORAL NIGHTLY
COMMUNITY
End: 2018-01-31

## 2018-01-25 RX ORDER — DICYCLOMINE HCL 20 MG
20 TABLET ORAL EVERY 6 HOURS
COMMUNITY
End: 2018-01-31

## 2018-01-25 RX ORDER — PANTOPRAZOLE SODIUM 40 MG/1
40 TABLET, DELAYED RELEASE ORAL DAILY
COMMUNITY
End: 2018-01-31

## 2018-01-29 ENCOUNTER — APPOINTMENT (OUTPATIENT)
Dept: ONCOLOGY | Facility: HOSPITAL | Age: 58
End: 2018-01-29

## 2018-01-29 ENCOUNTER — HOSPITAL ENCOUNTER (OUTPATIENT)
Dept: PET IMAGING | Facility: HOSPITAL | Age: 58
Discharge: HOME | End: 2018-01-29
Attending: SPECIALIST | Admitting: SPECIALIST

## 2018-01-29 DIAGNOSIS — Z85.118 HISTORY OF ADENOCARCINOMA OF LUNG: ICD-10-CM

## 2018-01-29 DIAGNOSIS — R10.9 ABDOMINAL PAIN, UNSPECIFIED ABDOMINAL LOCATION: ICD-10-CM

## 2018-01-29 PROBLEM — K62.5 RECTAL/ANAL HEMORRHAGE: Status: ACTIVE | Noted: 2018-01-29

## 2018-01-29 LAB — CREAT BLDA-MCNC: 0.8 MG/DL (ref 0.6–1.3)

## 2018-01-29 PROCEDURE — 0 IOPAMIDOL 61 % SOLUTION: Performed by: SPECIALIST

## 2018-01-29 PROCEDURE — 74177 CT ABD & PELVIS W/CONTRAST: CPT

## 2018-01-29 PROCEDURE — 82565 ASSAY OF CREATININE: CPT

## 2018-01-29 PROCEDURE — 0 DIATRIZOATE MEGLUMINE & SODIUM PER 1 ML: Performed by: SPECIALIST

## 2018-01-29 RX ADMIN — IOPAMIDOL 85 ML: 612 INJECTION, SOLUTION INTRAVENOUS at 08:45

## 2018-01-29 RX ADMIN — DIATRIZOATE MEGLUMINE AND DIATRIZOATE SODIUM 30 ML: 660; 100 LIQUID ORAL; RECTAL at 07:42

## 2018-01-31 ENCOUNTER — OFFICE VISIT (OUTPATIENT)
Dept: SURGERY | Facility: CLINIC | Age: 58
End: 2018-01-31

## 2018-01-31 VITALS
OXYGEN SATURATION: 97 % | DIASTOLIC BLOOD PRESSURE: 85 MMHG | BODY MASS INDEX: 28.82 KG/M2 | SYSTOLIC BLOOD PRESSURE: 132 MMHG | HEART RATE: 116 BPM | WEIGHT: 173 LBS | HEIGHT: 65 IN | TEMPERATURE: 98.4 F

## 2018-01-31 DIAGNOSIS — C79.9 METASTASIS FROM RECTAL CANCER (HCC): Primary | ICD-10-CM

## 2018-01-31 DIAGNOSIS — C20 METASTASIS FROM RECTAL CANCER (HCC): Primary | ICD-10-CM

## 2018-01-31 PROCEDURE — 99212 OFFICE O/P EST SF 10 MIN: CPT | Performed by: COLON & RECTAL SURGERY

## 2018-02-05 ENCOUNTER — HOSPITAL ENCOUNTER (OUTPATIENT)
Dept: PET IMAGING | Facility: HOSPITAL | Age: 58
End: 2018-02-05
Attending: COLON & RECTAL SURGERY

## 2018-02-15 NOTE — TELEPHONE ENCOUNTER
Pt's daughter calling to get PET scan results. Attempted to call daughter, no answer. Left message asking her to return our call.

## 2018-02-16 NOTE — TELEPHONE ENCOUNTER
Patients daughter calling for PET results. Explained she would need to call ordering doc or wait until Monday to see  to go over these at already scheduled appnt. Daughter v/u.

## 2018-02-19 PROBLEM — G89.3 CANCER ASSOCIATED PAIN: Status: ACTIVE | Noted: 2018-01-01

## 2018-02-19 NOTE — PROGRESS NOTES
[facility] GROUP OUTPATIENT FOLLOW UP CLINIC VISIT    REASON FOR FOLLOW-UP:    Malignant neoplasm of rectum    Staging form: Colon and Rectum, AJCC V7      Pathologic: Stage IIIC (T4b, N1b, cM0) - Signed by Jude Stewart MD on 12/9/2016        Staging comments: Suspected lung metastases.        Clinical: Stage TRUDY (T4b, N1b, M1a) - Signed by Jude Stewart MD on 12/19/2016  Kras mutation positive.  BRAF negative.  Microsatellite stable.      HISTORY OF PRESENT ILLNESS:  Benigno Quispe is a 57 y.o. female who was previously treated for metastatic rectal cancer. She was initially treated with full fairy and Avastin and then developed progression and was treated with FOLFOX.  She ultimately decided to discontinue treatment in late October 2017 with her last dose of FOLFOX/Malena on 10/30/17. SHe stopped treatment because she had very poor quality of life while on it.  She saw Dr. Clemente at the end of January 2018 with complaints of rectal pain with walking.  Sitting and hydrocodone helps with the pain. She's taking aout 7-8 tablets of hydrocodone a day. No difficulties with constipation.  She's suffering with depression related to malignancy as well as financial concerns.  She had a CT of the abdomen and pelvis performed on 1/29/2018 with a new soft tissue nodule in the presacral region as well as multiple pulmonary metastasis visualized in the lower lungs.  She went on to have a PET CT scan performed on 2/12/2018 which showed a 7 x 3 cm lesion in the rectal bed and left levator muscle that intensely hypermetabolic and consistent with recurrence.  Also hypermetabolic nodes on the internal iliac chains, right groin and multiple hypermetabolic pulmonary metastasis.  Patient has a poor appetite and pain but otherwise is active as long as she can have periods of rest. She is tearful today.     ONCOLOGIC HISTORY:     Malignant neoplasm of rectum    10/6/2016 Biopsy     Upper and lower endoscopy by Dr. Keller:  Rectal  mass showing invasive moderately differentiated adenocarcinoma.           10/12/2016 Imaging     CT imaging showed bilateral pulmonary nodules with the largest in the left lower lobe measuring 11 mm, concerning for pulmonary metastases. A mass in the pelvis measured about 4 x 3 cm with a 15 mm lymph node in the right pelvis.         10/31/2016 Surgery     APR with posterior vaginectomy by Brie Clemente and Marisa Burris. Pathology showed a 5.5 cm low-grade well to moderately differentiated adenocarcinoma with 2 of 20 lymph nodes positive for metastatic disease and a positive radial margin.         10/31/2016 Imaging     PET scan:  Multiple bilateral hypermetabolic pulmonary nodules likely  represent metastases. The hypermetabolic nodes along both pelvic  sidewalls are suspected to be metastatic. The activity along the anal  canal and the activity within shotty bilateral groin nodes is  indeterminate.         2/13/2017 -  Chemotherapy     OP COLORECTAL FOLFIRI + Bevacizumab (Irinotecan / Leucovorin / Fluorouracil / Bevacizumab)           5/15/2017 Progression            5/23/2017 -  Chemotherapy     OP COLON mFOLFOX6 + Bevacizumab (OXALIplatin / Leucovorin / Fluorouracil / Bevacizumab)              PAST MEDICAL, SURGICAL, FAMILY, AND SOCIAL HISTORIES were reviewed with the patient and in the electronic medical record, and were updated if indicated.    ALLERGIES:  Allergies   Allergen Reactions   • Adhesive Tape        MEDICATIONS:  The medication list has been reviewed with the patient by the medical assistant, and the list has been updated in the electronic medical record, which I reviewed.  Medication dosages and frequencies were confirmed to be accurate.    REVIEW OF SYSTEMS:  Review of Systems   Constitutional: Positive for activity change, appetite change and fatigue. Negative for chills, diaphoresis, fever and unexpected weight change.   Respiratory: Negative for cough, chest tightness and shortness of  breath.    Cardiovascular: Negative for chest pain and leg swelling.   Gastrointestinal: Positive for abdominal pain (rectal, severe). Negative for abdominal distention, blood in stool, constipation, diarrhea, nausea, rectal pain and vomiting.   Endocrine: Negative.    Genitourinary: Negative.    Musculoskeletal: Negative for arthralgias, joint swelling and myalgias.   Allergic/Immunologic: Negative.    Neurological: Positive for light-headedness.   Hematological: Negative for adenopathy. Does not bruise/bleed easily.   Psychiatric/Behavioral: Positive for agitation. Negative for confusion and dysphoric mood. The patient is not nervous/anxious.      Objective     Vitals:    02/19/18 1352   BP: 122/92   Pulse: 101   Resp: 18   Temp: 98.6 °F (37 °C)   SpO2: 96%   GENERAL:  Well-developed, well-nourished tearful female in no acute distress. Vital signs reviewed.   SKIN:  Warm, dry without rashes, purpura or petechiae.  EYES:  Pupils equal, round and reactive to light.  EOMs intact.  Conjunctivae normal.  HEAD:  Normocephalic.  EARS/NOSE/MOUTH/THROAT:  Hearing intact. Septum midline.  No excoriations or nasal discharge. No stomatitis or ulcers.  Lips normal. Oropharynx without lesions or exudates.  NECK:  Supple with good range of motion; no thyromegaly or masses  LYMPHATICS:  No cervical, supraclavicular, axillary adenopathy.  RESP:  Lungs clear to percussion and auscultation. Good airflow. Normal respiratory effort.   CHEST: Mediport - Yes, describe: benign appearing.  CARDIAC:  Regular rate and rhythm without murmurs, rubs or gallops. Normal S1,S2. No edema  GI:  Soft, nontender, no hepatosplenomegaly, normal bowel sounds  MSK:  No clubbing or cyanosis, No joint swelling noted in hands  NEUROLOGICAL:  Cranial Nerves II-XII grossly intact.  No focal neurological deficits.  PSYCHIATRIC:  Normal affect and mood.  Alert and Oriented x 3.       DIAGNOSTIC DATA:  Results for orders placed or performed in visit on 02/19/18    CBC Auto Differential   Result Value Ref Range    WBC 4.13 4.00 - 10.00 10*3/mm3    RBC 4.40 3.90 - 5.00 10*6/mm3    Hemoglobin 12.8 11.5 - 14.9 g/dL    Hematocrit 37.1 34.0 - 45.0 %    MCV 84.3 83.0 - 97.0 fL    MCH 29.1 27.0 - 33.0 pg    MCHC 34.5 32.0 - 35.0 g/dL    RDW 14.6 (H) 11.7 - 14.5 %    RDW-SD 44.0 37.0 - 49.0 fl    MPV 8.8 (L) 8.9 - 12.1 fL    Platelets 129 (L) 150 - 375 10*3/mm3    Neutrophil % 54.5 39.0 - 75.0 %    Lymphocyte % 34.4 20.0 - 49.0 %    Monocyte % 8.5 4.0 - 12.0 %    Eosinophil % 1.7 1.0 - 5.0 %    Basophil % 0.7 0.0 - 1.1 %    Immature Grans % 0.2 0.0 - 0.5 %    Neutrophils, Absolute 2.25 1.50 - 7.00 10*3/mm3    Lymphocytes, Absolute 1.42 1.00 - 3.50 10*3/mm3    Monocytes, Absolute 0.35 0.25 - 0.80 10*3/mm3    Eosinophils, Absolute 0.07 0.00 - 0.36 10*3/mm3    Basophils, Absolute 0.03 0.00 - 0.10 10*3/mm3    Immature Grans, Absolute 0.01 0.00 - 0.03 10*3/mm3    nRBC 0.0 0.0 - 0.0 /100 WBC       **I reviewed scans myself and concur with the below dictation.      Ct Abdomen Pelvis With Contrast    Result Date: 1/29/2018  Narrative: CT ABDOMEN PELVIS W CONTRAST-  CLINICAL HISTORY: 57 year old female with history of prostatic colon carcinoma. Rectal pain. Restaging.  TECHNIQUE: Spiral CT images were acquired through the abdomen and pelvis with oral and IV contrast and were reconstructed in 3 mm thick slices.  Radiation dose reduction techniques were utilized, including automated exposure control and exposure modulation based on body size.  COMPARISON: CT scan of the abdomen and pelvis dated 9/27/2017.  FINDINGS: The liver, spleen, pancreas, kidneys, and adrenal glands appear within normal limits. The patient is status post AP resection. A colostomy is present in the left lower quadrant. Multiple surgical clips are present in the presacral region on the previous location of the rectum. There is a 1.4 x 0.8 cm diameter soft tissue nodule adjacent to the superior margin of the most posterior  clips in the presacral region that is new since the preceding CT. In addition, there is soft tissue fullness inferior to the clips measuring up to 2.6 cm in diameter that is more prominent. This is suspicious for recurrent rectal neoplasm. There is no lymphadenopathy in the abdomen or pelvis. A small partially calcified leiomyoma in the anterior aspect of the body of uterus is unchanged. Multiple pulmonary nodules within the inferior aspects of both lungs demonstrate significant interval increase in size consistent with worsening hematogenous pulmonary metastatic disease. For instance, a pleural-based nodule in the lateral aspect of the left lower lobe that measures 1.9 cm in diameter on today's study, measured 1.3 cm in diameter on the previous CT scan.      Impression: New soft tissue nodule in the presacral region and new soft tissue fullness inferior to several surgical clips in the presacral region suspicious for recurrent rectal carcinoma. There is no evidence of metastatic disease in the abdomen or pelvis. Multiple pulmonary metastases within the visualized portions of both lower lung zones demonstrate significant interval increase in size since preceding CT imaging dated 9/27/2017.  This report was finalized on 1/29/2018 3:46 PM by Dr. Macho Dominique MD.      Nm Pet Skull Base To Mid Thigh    Result Date: 2/14/2018  Narrative: F-18 FDG PET FROM SKULL BASE TO MID THIGH WITH PET/CT FUSION  HISTORY: 57-year-old female with metastatic rectal cancer. Restaging.  TECHNIQUE: Radiation dose reduction techniques were utilized, including automated exposure control and exposure modulation based on body size. Blood glucose level at time of injection was 104 mg/dL.  5.7 mCi of F-18 FDG were injected and PET was performed from skull base to mid thigh. CT was obtained for localization and attenuation correction. Time at injection 10:48 AM. PET start time 12:35 PM. Compared with previous CT of the abdomen and pelvis from  01/29/2018, previous CTs from 09/27/2017 and PET/CT from 12/15/2016.  FINDINGS: The nodular soft tissue thickening in the precoccygeal and presacral region extending caudally to the gluteal crease is intensely hypermetabolic. On the PET sequence, the region of abnormal hypermetabolic activity measures 7 cm in craniocaudad span and 3 cm in AP diameter. The maximal SUV is 17.4. Adjacently is a hypermetabolic 2.4 cm nodular density along the inferior margin of the left levator musculature with a maximal SUV of 19.0. There is also hypermetabolic right groin lymphadenopathy with 2 adjacent nodes measuring in total 2.5 x 1.5 cm and the maximal SUV is 11.3. There are also hypermetabolic nodes along the internal iliac chains bilaterally. There is no suspicious hypermetabolic activity within the abdomen. There is a typical speckled pattern of activity throughout the liver. There are multiple hypermetabolic pulmonary nodules bilaterally. A 2 cm right lower lobe nodule has a maximal SUV of 10.3. There is no suspicious hypermetabolic activity within the mediastinum or german. There is no suspicious hypermetabolic activity within the neck.      Impression: Approximately 7 x 3 cm lesion at the rectal bed and left levator musculature is intensely hypermetabolic and is consistent with recurrence. The hypermetabolic nodes along the internal iliac chains and the right groin are likely metastatic and there are also multiple hypermetabolic pulmonary metastases.  This report was finalized on 2/14/2018 3:00 PM by Dr. Alea Silva MD.        Assessment/Plan   ASSESSMENT/PLAN:  This is a 57 y.o. female with:  1.  Metastatic rectal cancer, Kras mutated: She had a resection on 10/31/2016 by Brie Clemente and [place].  Unfortunately, PET scan shows evidence for local lydia metastases as well as bilateral pulmonary metastases.    - Palliative FOLFIRI/Avastin started 2/10/17. Multiple dose reductions due to cytopenias. Stopped 5/2017 due to disease  progression   - FOLFOX with Avastin initiated 5/23/2017. Patient self stopped 10/30/17 and was lost to follow up.    - Returned to clinic 2/19/18 with progressive metastatic disease with large local, painful recurrence and multiple pulmonary metastasis.  We discussed the recurrence today.  Patient is not interested in resuming chemotherapy as it did not provide quality of life.  We discussed hospice versus palliative radiation therapy and patient would like to meet with the radiation physicians to see if that is an option.  I will also work on controlling her pain.    2. Cancer related rectal pain.    - Start MS Contin 30 mg bid   - Continue Norco 10/325, 1-2 tabs every 6 hours prn pain   - Senna S 2 tabs nightly   - Discussed with patient that this is a starting point and that we can increase the MS Contin to 3 times a day if needed and make multiple other adjustments.     3.  Anxiety.  Previously on Celexa and Ativan the patient self stopped.  We will continue her Klonopin which seems to help.    PLAN:  1. Refer to radiation oncology for consideration of palliative radiation  2. Pain medications as above  3. M.D. in one month to evaluate effectiveness of pain regimen and follow up radiation oncology consultation. Patient and daughter can call if regimen isn't working as well.   4. We discussed goals of care today and her goals are palliative/quality of life base.  She understands that her disease is terminal.    Pilar Paez MD

## 2018-03-02 NOTE — PROGRESS NOTES
Subjective     Pilar Paez MD     Diagnosis Plan   1. Malignant neoplasm of rectum       CC: stage IV rectal cancer with                               Dear Pilar Paez MD    I had the pleasure of seeing Benigno Quispe  today in the Radiation Center.    The patient is a 57 y.o. year old female with a history of rectal adenocarcinoma first diagnosed in October 2016 at which time she was also noted to have pulmonary nodules suspicious for mets.  She underwent an APR with posterior vaginectomy with Dr. Clemente and Dr. Marisa Burris on 10/31/16.  She was started on FOLFIRI and Bevacizumab in February 2017 and was noted to have progression of disease in May 2017 at which time she was switched to FOLFOX 6 and Bevacizumab.  She voluntarily stopped her chemo in October 2017 and was lost to follow up for a few months.  She returned to the [facility] in February 2018 with a painful recurrence in her rectal area.  She had a CT of the abdomen and pelvis on 1/29/18 which unfortunately showed a new soft tissue nodule in the presacral area 1.4cm in size and fullness just inferior concerning for recurrence, as well as multiple pulmonary nodules which had increased in size.  She then had a PET scan on 2/12/18 which showed hypermetabolic activity in the soft tissue nodularity in the precoccygeal and presacral region extending to the gluteal crease, 7 cm x 3cm with max SUV of 17.4 with hypermetabolic inguinal nodes, internal iliac nodes and bilatearl pulmonary nodules.  She met with Dr. Paez on 2/19/18 and was not interested in pursuing any more chemotherapy.  She has started MS Contin 30mg bid and Norco 10/325 1-2 every 6 hrs as needed.  She is referred today for evaluation for palliative radiation.   She is Citizen of [place] and is accompanied by her daughter and an .  She reports continued pain in her rectal area which she ranks 5 out of 10 on pain scale.  She denies any numbness or tingling of her legs.        Review of  Systems   Constitutional: Positive for activity change and fatigue.   HENT: Negative.    Eyes: Negative.    Respiratory: Negative.    Cardiovascular: Negative.    Gastrointestinal: Negative.    Genitourinary: Positive for frequency.   Musculoskeletal: Positive for back pain.   Skin: Negative.    Neurological: Negative.    Hematological: Negative.    Psychiatric/Behavioral: Positive for dysphoric mood.         Past Medical History:   Diagnosis Date   • Abdominal pain    • Adenocarcinoma, lung 2017   • Anxiety    • Anxiety and depression    • Arthritis    • Colon polyps 10/06/2016   • Dehydration    • Depression    • Diarrhea    • Dyspepsia    • Fatigue    • GERD (gastroesophageal reflux disease)    • Hemorrhoids    • Hx antineoplastic chemo    • Hyperlipidemia    • Hypertension     history of  no meds now   • Insomnia    • Iron deficiency anemia    • Neutropenia    • Rectal cancer 2016   • Rectal/anal hemorrhage    • Unintentional weight loss    • Urine leukocytes    • Vitamin B12 deficiency anemia          Past Surgical History:   Procedure Laterality Date   • ABDOMINAL PERINEAL RESECTION N/A 10/31/2016    Procedure: ABDOMINAL PERINEAL RESECTION LAPAROSCOPIC, MOBILIZATION OF OMENTAL PEDICLE FLAP;  Surgeon: Gaudencio Clemente MD;  Location: [facility] MAIN OR;  Service:    • COLONOSCOPY N/A 10/6/2016    Procedure: COLONOSCOPY INTO CECUM WITH COLD BX POLYPECTOMIES AND COLD BX;  Surgeon: Seven Keller MD;  Location: [facility] ENDOSCOPY;  Service:    • ENDOSCOPY N/A 10/6/2016    Procedure: ESOPHAGOGASTRODUODENOSCOPY WITH BIOPSIES;  Surgeon: Seven Keller MD;  Location: [facility] ENDOSCOPY;  Service:    • FOOT SURGERY Bilateral     AT 8 YEARS OLD   • NE INSJ TUNNELED CVC W/O SUBQ PORT/ AGE 5 YR/> N/A 1/11/2017    Procedure: INSERTION VENOUS ACCESS DEVICE;  Surgeon: Geo Lovett Jr., MD;  Location: [facility] MAIN OR;  Service: General   • SIGMOIDOSCOPY N/A 10/17/2016    Procedure: SIGMOIDOSCOPY FLEXIBLE TO  SIGMOID COLON;  Surgeon: Gaudencio Clemente MD;  Location: [facility] ENDOSCOPY;  Service:    • TRANSRECTAL ULTRASOUND N/A 10/17/2016    Procedure: ENDORECTAL ULTRASOUND;  Surgeon: Gaudencio Clemente MD;  Location: [facility] ENDOSCOPY;  Service:    • VULVECTOMY Right 10/31/2016    Procedure: POSTERIOR VAGINECTOMY REPAIR;  Surgeon: Marisa Burris MD;  Location: [facility] MAIN OR;  Service:          Social History     Social History   • Marital status: Legally    • Number of children: 2   • Years of education: 12     Occupational History   • DISABLED      Social History Main Topics   • Smoking status: Former Smoker     Types: Cigarettes     Quit date: 8/14/2006   • Smokeless tobacco: Never Used      Comment: Off and on   • Alcohol use No   • Drug use: No   • Sexual activity: Defer         Family History   Problem Relation Age of Onset   • Diabetes Mother    • Stroke Father    • Dementia Father    • Anemia Father      Chronic   • No Known Problems Brother    • No Known Problems Daughter    • No Known Problems Son    • No Known Problems Sister    • No Known Problems Sister    • No Known Problems Sister    • No Known Problems Brother           Objective    Physical Exam   Constitutional: She is oriented to person, place, and time. She appears well-developed and well-nourished.   HENT:   Head: Normocephalic and atraumatic.   Eyes: EOM are normal.   Neck: Neck supple.   Lymphadenopathy:     She has no cervical adenopathy.   Neurological: She is alert and oriented to person, place, and time.   Skin: Skin is warm and dry.   Psychiatric: She has a normal mood and affect. Her behavior is normal. Judgment and thought content normal.         Current Outpatient Prescriptions on File Prior to Visit   Medication Sig Dispense Refill   • clonazePAM (KlonoPIN) 1 MG tablet Take 1 tablet by mouth 2 (Two) Times a Day As Needed for Anxiety. 60 tablet 2   • HYDROcodone-acetaminophen (NORCO)  MG per tablet 1-2 tabs by mouth every 6 hours as  needed for pain 180 tablet 0   • Morphine (MS CONTIN) 30 MG 12 hr tablet Take 1 tablet by mouth Every 12 (Twelve) Hours. 60 tablet 0     Current Facility-Administered Medications on File Prior to Visit   Medication Dose Route Frequency Provider Last Rate Last Dose   • heparin flush (porcine) 100 UNIT/ML injection 500 Units  500 Units Intravenous PRN Robe Corral MD   500 Units at 04/03/17 0840   • heparin flush (porcine) 100 UNIT/ML injection 500 Units  500 Units Intravenous PRN Pilar Paez MD   500 Units at 05/30/17 0831   • heparin flush (porcine) 100 UNIT/ML injection 500 Units  500 Units Intravenous PRN Pilar Paez MD   500 Units at 09/25/17 1101   • heparin injection 500 Units  500 Units Intracatheter Q8H Jude Stewart MD   500 Units at 01/27/17 1024   • heparin injection 500 Units  500 Units Intracatheter Q8H Pilar Paez MD   500 Units at 02/27/17 1427   • sodium chloride 0.9 % flush 10 mL  10 mL Intravenous PRN Robe Corral MD   10 mL at 04/03/17 0839   • sodium chloride 0.9 % flush 10 mL  10 mL Intravenous PRN Pilar Paez MD   10 mL at 05/30/17 0831   • sodium chloride 0.9 % flush 10 mL  10 mL Intravenous PRN Pilar Paez MD   10 mL at 09/25/17 1100       ALLERGIES:    Allergies   Allergen Reactions   • Adhesive Tape        There were no vitals taken for this visit.     Current Status 2/19/2018   ECOG score 0         Assessment/Plan   57 year old female with stage IV rectal cancer with a painful recurrence in the pelvis/presacral region.  I have personally reviewed her imaging and records from referring physicians.   I discussed with her my recommendation for palliative radiation therapy to the pelvic recurrence in hopes of reducing her pain and narcotic requirements. I discussed in detail the risks, benefits and rationale of radiation therapy to the pelvis to include but not limited to the following:    Acute:  skin erythema, breakdown, fatigue, lowered blood counts, risk of  infection, nausea, vomiting, abdominal cramping, diarrhea, bowel or bladder incontinence, urinary urgency, hesitancy, frequency or dysuria    Late:  persistant fatigue, lowered blood counts, bowel ulceration, small bowel obstruction, anal stricture, persistant bowel incontinence or frequency, hip fracture, shrinkage of the bladder resulting in increased urinary frequency, and the remote risk of second malignancies    She voiced understanding and an informed consent was signed and placed on the chart today.  We will proceed with CT simualtion for treatment planning purposes today and begin her treatments next week.  I plan to deliver a dose of 3500cGy in 14 fractions.     I spent greater than 45  minutes in face-to-face time with the patient and 40 minutes of that time was spent in counseling and coordination of care, including review of imaging and pathology; indications, goals, logistics, alternatives and risks - both common and rare - for my recommendations as well as surveillance and potential outcomes.              Thank you very much for allowing me to participate in the care of this very pleasant patient.    Sincerely,      Zaynab Barraza MD

## 2018-03-06 NOTE — PROGRESS NOTES
Spoke with Frank and shahida Ruiz in  for [facility] regarding having an  for Ms Quispe.  is scheduled for Wednesday 3/7/18 treatment. Patient is scheduled daily at 1pm for radiation treatments. Sara will contact  to be present for treatments. I have asked they arrive at 1245 to ensure they are here in time for patient treatment.  I have also contacted OSW Nicolasa Philip regarding patient need for transportation beginning March 8. Patient lives in [facility] and does not drive . Her family is not available to transport. Nicolasa was given Ms Quispe daughter name and phone number and will reach out to her.

## 2018-03-07 NOTE — PROGRESS NOTES
Referral received from Abigail RN in radiation center.  Patient in need of transportation assistance.  Patient to undergo 14 palliative radiation treatments for her rectal cancer.  Patient does not drive and does not speak english.  She has an interpretor with her.   OSW called and spoke to patients CristotRafaela.  Explained transportation options to her.    She is interested in LYFT transportation for her mother as well as [employer].  Dgt states that she is able to  her mother on Mondays and Tuesdays.  OSW will arranged LYFT for Thursday and Friday  This week.  Will also  Make referral to [employer] to see if any volunteer drivers are available.  Dgt to let patient know about LYFT ride for tomorrow.    OSW to follow up with patient in XRT center tomorrow.  Thank you,  Nicolasa Philip, MSSW, CSW, OSW-C

## 2018-03-07 NOTE — PROGRESS NOTES
Physician Weekly Management Note    Diagnosis:     Diagnosis Plan   1. Malignant neoplasm of rectum         RT Details:  fx 2/14 pelvis 3 field  Notes on Treatment course, Films, Medical progress:  Doing ok, no n/v, pain controlled today    Weekly Management:  Medication reviewed?   Yes  New medications given?   No  Problemlist reviewed?   Yes  Problem added?   No  Issues raised requiring referral to support services - task assigned to:  dano    Technical aspects reviewed:  Weekly OBI approved?   Yes  Weekly port films approved?   Yes  Change requests noted on port film?   No  Patient setup and plan reviewed?   Yes    Chart Reviewed:  Continue current treatment plan?   Yes  Treatment plan change requested?   No  CBC reviewed?   No  Concurrent Chemo?   No    Objective     Toxicities:   none     Review of Systems   Constitutional: Positive for fatigue.          Vitals:    03/07/18 1316   BP: 126/67   Pulse: 87   Resp: 16   Temp: 98.2 °F (36.8 °C)   SpO2: 94%       Current Status 2/19/2018   ECOG score 0       Physical Exam   Constitutional: She appears well-developed and well-nourished.           Problem Summary List    Diagnosis:     Diagnosis Plan   1. Malignant neoplasm of rectum       Pathology:   Rectal adenoca    Past Medical History:   Diagnosis Date   • Abdominal pain    • Adenocarcinoma, lung 2017   • Anxiety    • Anxiety and depression    • Arthritis    • Colon polyps 10/06/2016   • Dehydration    • Depression    • Diarrhea    • Dyspepsia    • Fatigue    • GERD (gastroesophageal reflux disease)    • Hemorrhoids    • Hx antineoplastic chemo    • Hyperlipidemia    • Hypertension     history of  no meds now   • Insomnia    • Iron deficiency anemia    • Neutropenia    • Rectal cancer 2016   • Rectal/anal hemorrhage    • Unintentional weight loss    • Urine leukocytes    • Vitamin B12 deficiency anemia          Past Surgical History:   Procedure Laterality Date   • ABDOMINAL PERINEAL RESECTION N/A 10/31/2016     Procedure: ABDOMINAL PERINEAL RESECTION LAPAROSCOPIC, MOBILIZATION OF OMENTAL PEDICLE FLAP;  Surgeon: Gaudencio Clemente MD;  Location: [facility] MAIN OR;  Service:    • COLONOSCOPY N/A 10/6/2016    Procedure: COLONOSCOPY INTO CECUM WITH COLD BX POLYPECTOMIES AND COLD BX;  Surgeon: Seven Keller MD;  [facility] END[facility];  Service:    • ENDOSCOPY N/A 10/6/2016    Procedure: ESOPHAGOGASTRODUODENOSCOPY WITH BIOPSIES;  Surgeon: Seven Keller MD;  [facility] END[facility];  Service:    • FOOT SURGERY Bilateral     AT 8 YEARS OLD   • ND INSJ TUNNELED CVC W/O SUBQ PORT/ AGE 5 YR/> N/A 1/11/2017    Procedure: INSERTION VENOUS ACCESS DEVICE;  Surgeon: Geo Lovett Jr., MD;  Location: [facility] MAIN OR;  Service: General   • SIGMOIDOSCOPY N/A 10/17/2016    Procedure: SIGMOIDOSCOPY FLEXIBLE TO SIGMOID COLON;  Surgeon: Gaudencio Clemente MD;  Location: [facility] ENDOSCOPY;  Service:    • TRANSRECTAL ULTRASOUND N/A 10/17/2016    Procedure: ENDORECTAL ULTRASOUND;  Surgeon: Gaudencio Clemente MD;  Location: [facility] ENDOSCOPY;  Service:    • VULVECTOMY Right 10/31/2016    Procedure: POSTERIOR VAGINECTOMY REPAIR;  Surgeon: Marisa Burris MD;  Location: [facility] MAIN OR;  Service:          Current Outpatient Prescriptions on File Prior to Visit   Medication Sig Dispense Refill   • clonazePAM (KlonoPIN) 1 MG tablet Take 1 tablet by mouth 2 (Two) Times a Day As Needed for Anxiety. 60 tablet 2   • HYDROcodone-acetaminophen (NORCO)  MG per tablet 1-2 tabs by mouth every 6 hours as needed for pain 180 tablet 0   • Morphine (MS CONTIN) 30 MG 12 hr tablet Take 1 tablet by mouth Every 12 (Twelve) Hours. 60 tablet 0     Current Facility-Administered Medications on File Prior to Visit   Medication Dose Route Frequency Provider Last Rate Last Dose   • heparin flush (porcine) 100 UNIT/ML injection 500 Units  500 Units Intravenous PRN Robe Corral MD   500 Units at 04/03/17 0840   • heparin flush (porcine) 100  UNIT/ML injection 500 Units  500 Units Intravenous PRN Pilar Paez MD   500 Units at 05/30/17 0831   • heparin flush (porcine) 100 UNIT/ML injection 500 Units  500 Units Intravenous PRN Pilar Paez MD   500 Units at 09/25/17 1101   • heparin injection 500 Units  500 Units Intracatheter Q8H Jude Stewart MD   500 Units at 01/27/17 1024   • heparin injection 500 Units  500 Units Intracatheter Q8H Pilar Paez MD   500 Units at 02/27/17 1427   • sodium chloride 0.9 % flush 10 mL  10 mL Intravenous PRN Robe Corral MD   10 mL at 04/03/17 0839   • sodium chloride 0.9 % flush 10 mL  10 mL Intravenous PRN Pilar Paez MD   10 mL at 05/30/17 0831   • sodium chloride 0.9 % flush 10 mL  10 mL Intravenous PRN Pilar Paez MD   10 mL at 09/25/17 1100       Allergies   Allergen Reactions   • Adhesive Tape          Referring Provider:    No referring provider defined for this encounter.    Oncologist:  No primary care provider on file.      Seen and approved by:  Zaynab Barraza MD  03/07/2018

## 2018-03-13 NOTE — TELEPHONE ENCOUNTER
Phone call received from patient's Dgt Alexa.  She states that [employer] volunteer  will be transporting patient tomorrow.    [employer] rides set up for Thursday and Friday this week for patient.    Will follow up with dgt next week regarding transportation assistance.    Thank you,  Nicolasa Philip, ALAINAW, CSW, OSW-C

## 2018-03-14 NOTE — PROGRESS NOTES
Physician Weekly Management Note    Diagnosis:     Diagnosis Plan   1. Malignant neoplasm of rectum         RT Details:  fx 7/14 rectum  Notes on Treatment course, Films, Medical progress:  Doing ok, pain a little better, no diarrhea, cont on    Weekly Management:  Medication reviewed?   Yes  New medications given?   No  Problemlist reviewed?   Yes  Problem added?   No  Issues raised requiring referral to support services - task assigned to:  dano    Technical aspects reviewed:  Weekly OBI approved?   Yes  Weekly port films approved?   Yes  Change requests noted on port film?   No  Patient setup and plan reviewed?   Yes    Chart Reviewed:  Continue current treatment plan?   Yes  Treatment plan change requested?   No  CBC reviewed?   Yes  Concurrent Chemo?   No    Objective     Toxicities:   none     Review of Systems   Constitutional: Positive for fatigue.   Gastrointestinal: Positive for rectal pain. Negative for nausea and vomiting.          Vitals:    03/14/18 1327   BP: 125/80   Pulse: 109   Resp: 16   Temp: 97.7 °F (36.5 °C)   SpO2: 97%       Current Status 2/19/2018   ECOG score 0       Physical Exam   Constitutional: She appears well-developed and well-nourished.           Problem Summary List    Diagnosis:     Diagnosis Plan   1. Malignant neoplasm of rectum       Pathology:   rectal    Past Medical History:   Diagnosis Date   • Abdominal pain    • Adenocarcinoma, lung 2017   • Anxiety    • Anxiety and depression    • Arthritis    • Colon polyps 10/06/2016   • Dehydration    • Depression    • Diarrhea    • Dyspepsia    • Fatigue    • GERD (gastroesophageal reflux disease)    • Hemorrhoids    • Hx antineoplastic chemo    • Hyperlipidemia    • Hypertension     history of  no meds now   • Insomnia    • Iron deficiency anemia    • Neutropenia    • Rectal cancer 2016   • Rectal/anal hemorrhage    • Unintentional weight loss    • Urine leukocytes    • Vitamin B12 deficiency anemia          Past Surgical History:    Procedure Laterality Date   • ABDOMINAL PERINEAL RESECTION N/A 10/31/2016    Procedure: ABDOMINAL PERINEAL RESECTION LAPAROSCOPIC, MOBILIZATION OF OMENTAL PEDICLE FLAP;  Surgeon: Gaudencio Clemente MD;  Location: [facility] MAIN OR;  Service:    • COLONOSCOPY N/A 10/6/2016    Procedure: COLONOSCOPY INTO CECUM WITH COLD BX POLYPECTOMIES AND COLD BX;  Surgeon: Seven Keller MD;  [facility];  Service:    • ENDOSCOPY N/A 10/6/2016    Procedure: ESOPHAGOGASTRODUODENOSCOPY WITH BIOPSIES;  Surgeon: Seven Keller MD;  Location: [facility] ENDOSCOPY;  Service:    • FOOT SURGERY Bilateral     AT 8 YEARS OLD   • ID INSJ TUNNELED CVC W/O SUBQ PORT/ AGE 5 YR/> N/A 1/11/2017    Procedure: INSERTION VENOUS ACCESS DEVICE;  Surgeon: Geo Lovett Jr., MD;  Location: [facility] MAIN OR;  Service: General   • SIGMOIDOSCOPY N/A 10/17/2016    Procedure: SIGMOIDOSCOPY FLEXIBLE TO SIGMOID COLON;  Surgeon: Gaudencio Clemente MD;  Location: [facility] ENDOSCOPY;  Service:    • TRANSRECTAL ULTRASOUND N/A 10/17/2016    Procedure: ENDORECTAL ULTRASOUND;  Surgeon: Gaudencio Clemente MD;  Location: [facility] ENDOSCOPY;  Service:    • VULVECTOMY Right 10/31/2016    Procedure: POSTERIOR VAGINECTOMY REPAIR;  Surgeon: Marisa Burris MD;  Location: [facility] MAIN OR;  Service:          Current Outpatient Prescriptions on File Prior to Visit   Medication Sig Dispense Refill   • clonazePAM (KlonoPIN) 1 MG tablet Take 1 tablet by mouth 2 (Two) Times a Day As Needed for Anxiety. 60 tablet 2   • HYDROcodone-acetaminophen (NORCO)  MG per tablet 1-2 tabs by mouth every 6 hours as needed for pain 180 tablet 0   • Morphine (MS CONTIN) 30 MG 12 hr tablet Take 1 tablet by mouth Every 12 (Twelve) Hours. 60 tablet 0     Current Facility-Administered Medications on File Prior to Visit   Medication Dose Route Frequency Provider Last Rate Last Dose   • heparin flush (porcine) 100 UNIT/ML injection 500 Units  500 Units Intravenous PRN Robe  DANN Corral MD   500 Units at 04/03/17 0840   • heparin flush (porcine) 100 UNIT/ML injection 500 Units  500 Units Intravenous PRN Pilar Paez MD   500 Units at 05/30/17 0831   • heparin flush (porcine) 100 UNIT/ML injection 500 Units  500 Units Intravenous PRN Pilar Paez MD   500 Units at 09/25/17 1101   • heparin injection 500 Units  500 Units Intracatheter Q8H Jude Stewart MD   500 Units at 01/27/17 1024   • heparin injection 500 Units  500 Units Intracatheter Q8H Pilar Paez MD   500 Units at 02/27/17 1427   • sodium chloride 0.9 % flush 10 mL  10 mL Intravenous PRN Robe Corral MD   10 mL at 04/03/17 0839   • sodium chloride 0.9 % flush 10 mL  10 mL Intravenous PRN Pilar Paez MD   10 mL at 05/30/17 0831   • sodium chloride 0.9 % flush 10 mL  10 mL Intravenous PRN Pilar Paez MD   10 mL at 09/25/17 1100       Allergies   Allergen Reactions   • Adhesive Tape          Referring Provider:    No referring provider defined for this encounter.    Oncologist:  No primary care provider on file.      Seen and approved by:  Zaynab Barraza MD  03/14/2018

## 2018-03-22 NOTE — TELEPHONE ENCOUNTER
Patient's daughter calling for refill on morphine and script for donut cushion for patient. These were printed and signed by Dr Paez. Dr paez would like f/u with patient in the next month if possible. Notified daughter and she v/u. Inbasket sent to scheduling to set this up.

## 2018-04-03 NOTE — PROGRESS NOTES
Patient Name:  Benigno Quispe  YOB: 1960   Medical Record No.: [id number]  Diagnosis:  Malignant neoplasm of rectum     Dear AROLDO Paez    I am writing to let you know Benigno Quispe has recently completed the course of radiation therapy prescribed for the above-mentioned diagnosis.  Please see below the specifics of the course of treatment delivered:     Dates of treatment:  3/6/2018 - 3/23/2018.    Details of radiation therapy delivered:      we treated the pelvis and rectum to a dose of 35Gy in 14 fractions using 3 field technique with 18 mv photons.       Tolerance and toxicities: she  tolerated the treatments well with no breaks.  She had significant improvement in her pain.  She completed the above treatments in 17 elapsed days.    Course: C1-pelvis    F/U plans:  I have asked  to return to see me in 4 weeks. thanks for allowing us to participate in your patient’s care.      Sincerely,    Zaynab Barraza M.D.

## 2018-04-26 NOTE — TELEPHONE ENCOUNTER
Pt's daughter called requesting a script for pt's Morphine which will run out Saturday. Script printed and signed by Dr Paez. Pt's daughter stated it will be picked up today or tomorrow.

## 2018-05-11 NOTE — PROGRESS NOTES
[facility] GROUP OUTPATIENT FOLLOW UP CLINIC VISIT    REASON FOR FOLLOW-UP:    Malignant neoplasm of rectum    Staging form: Colon and Rectum, AJCC V7      Pathologic: Stage IIIC (T4b, N1b, cM0) - Signed by Jude Stewart MD on 12/9/2016        Staging comments: Suspected lung metastases.        Clinical: Stage TRUDY (T4b, N1b, M1a) - Signed by Jude Stewart MD on 12/19/2016  Kras mutation positive.  BRAF negative.  Microsatellite stable.      HISTORY OF PRESENT ILLNESS:  Benigno Quispe is a 57 y.o. female who was previously treated for metastatic rectal cancer. She's currently on palliative care and using MS Contin and Klonopin.  Her pain is well-controlled.  Generally she feels very well without new complaints today.  She is taking the MS Contin twice a day and Norco when necessary.  She has normal bowel movements.  She denies any fevers, chills, night sweats.    I reviewed and updated patient's past medical social and surgical history as necessary.    ALLERGIES:  Allergies   Allergen Reactions   • Adhesive Tape        MEDICATIONS:  The medication list has been reviewed with the patient by the medical assistant, and the list has been updated in the electronic medical record, which I reviewed.  Medication dosages and frequencies were confirmed to be accurate.    REVIEW OF SYSTEMS:  Review of Systems   Constitutional: Positive for fatigue. Negative for activity change, appetite change, chills, diaphoresis, fever and unexpected weight change.   Respiratory: Negative for cough, chest tightness and shortness of breath.    Cardiovascular: Negative for chest pain and leg swelling.   Gastrointestinal: Positive for abdominal pain (rectal, improved). Negative for abdominal distention, blood in stool, constipation, diarrhea, nausea, rectal pain and vomiting.   Endocrine: Negative.    Genitourinary: Negative.    Musculoskeletal: Negative for arthralgias, joint swelling and myalgias.   Allergic/Immunologic: Negative.     Neurological: Negative for light-headedness.   Hematological: Negative for adenopathy. Does not bruise/bleed easily.   Psychiatric/Behavioral: Negative for agitation, confusion and dysphoric mood. The patient is not nervous/anxious.      Objective     Vitals:    05/14/18 1529   BP: 122/78   Pulse: 71   Resp: 12   Temp: 98.5 °F (36.9 °C)   SpO2: 98%      GENERAL: female comfortable, no acute distress  SKIN:  Warm, without rashes, purpura or petechiae.   EYES:  EOMs intact.  Conjunctivae normal. Pupils equal and reactive to light.   EARS:  Hearing intact.  RESP:  Lungs clear to percussion and auscultation. Good airflow. Normal effort.   CHEST: Mediport -Yes,   CARDIAC:  Regular rate and rhythm without murmurs, rubs or gallops. Normal S1,S2. Lower extremity edema:  No  GI:  Soft, nontender, normal bowel sounds, no hepatosplenomegaly  PSYCHIATRIC:  Normal affect and mood; alert and oriented x 3; Insight and judgement appropriate      DIAGNOSTIC DATA:  Results for orders placed or performed in visit on 04/02/18   CBC Auto Differential   Result Value Ref Range    WBC 3.66 (L) 4.00 - 10.00 10*3/mm3    RBC 4.26 3.90 - 5.00 10*6/mm3    Hemoglobin 12.1 11.5 - 14.9 g/dL    Hematocrit 36.4 34.0 - 45.0 %    MCV 85.4 83.0 - 97.0 fL    MCH 28.4 27.0 - 33.0 pg    MCHC 33.2 32.0 - 35.0 g/dL    RDW 14.6 (H) 11.7 - 14.5 %    RDW-SD 44.9 37.0 - 49.0 fl    MPV 8.9 8.9 - 12.1 fL    Platelets 146 (L) 150 - 375 10*3/mm3    Neutrophil % 61.8 39.0 - 75.0 %    Lymphocyte % 24.9 20.0 - 49.0 %    Monocyte % 9.8 4.0 - 12.0 %    Eosinophil % 2.5 1.0 - 5.0 %    Basophil % 0.5 0.0 - 1.1 %    Immature Grans % 0.5 0.0 - 0.5 %    Neutrophils, Absolute 2.26 1.50 - 7.00 10*3/mm3    Lymphocytes, Absolute 0.91 (L) 1.00 - 3.50 10*3/mm3    Monocytes, Absolute 0.36 0.25 - 0.80 10*3/mm3    Eosinophils, Absolute 0.09 0.00 - 0.36 10*3/mm3    Basophils, Absolute 0.02 0.00 - 0.10 10*3/mm3    Immature Grans, Absolute 0.02 0.00 - 0.03 10*3/mm3    nRBC 0.0 0.0 -  0.0 /100 WBC       Assessment/Plan   ASSESSMENT/PLAN:  This is a 57 y.o. female with:  1.  Metastatic rectal cancer, Kras mutated: She had a resection on 10/31/2016 by Brie Clemente and [place].  Unfortunately, PET scan shows evidence for local lydia metastases as well as bilateral pulmonary metastases.    - Palliative FOLFIRI/Avastin started 2/10/17. Multiple dose reductions due to cytopenias. Stopped 5/2017 due to disease progression   - FOLFOX with Avastin initiated 5/23/2017. Patient self stopped 10/30/17 and was lost to follow up.    - Returned to clinic 2/19/18 with progressive metastatic disease with large local, painful recurrence and multiple pulmonary metastasis. Declined systemic therapy but agreed to radiation therapy; pain greatly improved after RT.   - Currently on comfort care.  Her symptoms very well controlled.  We've previously discussed hospice but she declines at this point.    2. Cancer related rectal pain, well controlled. Continue current regimen as below. Will call when refill needed.    - Continue MS Contin 30 mg bid   - Continue Norco 10/325, 1-2 tabs every 6 hours prn pain   - Senna S 2 tabs nightly     3.  Anxiety.  Previously on Celexa and Ativan the patient self stopped.  We will continue her Klonopin which seems to help.    PLAN:  1. Continue Norco, MS Contin and Klonopin.  2. MD visit with port flush in 8 weeks. She will call for refill of MS Contin when due later this month.     Pilar Paez MD

## 2018-05-21 NOTE — TELEPHONE ENCOUNTER
Pt called and left VM that she needed a refill on hydrocodone and klonopin. Last filled klonopin and hydrocodone on 4/2. Routed scripts for Dr. Paez to approve.

## 2018-06-28 NOTE — TELEPHONE ENCOUNTER
Daughter states pt needs rf on hydrocodone and morphine. Orders place and routed to  for signature.    ----- Message from Deena Joaquin sent at 6/28/2018 12:15 PM EDT -----  [phone] Darlene red pain med refill.

## 2018-07-05 NOTE — TELEPHONE ENCOUNTER
Daughter called in for increase in pain.  Reviewed with Dr. Murry.  Will increase MS contin 30 mg to q 8 hours.

## 2018-07-12 NOTE — ED TRIAGE NOTES
Pt reports left abdominal pain that started a few days ago. PT denies n/v/d. Pt has a history of rectal cancer.

## 2018-07-13 NOTE — DISCHARGE INSTRUCTIONS
You are advised to follow closely with Dr Paez in 1-2 days for recheck, final results of lab work and imaging testing, and further testing/treatment as needed.    Drink plenty of fluids    Please return to the emergency department immediately with chest pain different than usual for you, shortness of air, abdominal pain, persistent vomiting/fever, blood in emesis or stool, lightheadedness/fainting, problems with speech, one sided weakness/numbness, new incontinence, problems with vision,  or for worsening of symptoms or other concerns.

## 2018-07-13 NOTE — ED NOTES
Per family pt currently has rectal cancer and is not getting chemo for it. She c/o LLQ abd pain and burning with urination with pink tinge urine. Reassurance given; call light in reach. Pts breathing even and unlabored. Pt appears in NAD at this time. Family at bedside.        Lakia Sherwood RN  07/12/18 2021

## 2018-07-13 NOTE — ED PROVIDER NOTES
" EMERGENCY DEPARTMENT ENCOUNTER    CHIEF COMPLAINT  Chief Complaint: LLQ abd pain  History given by: Patient and family  History limited by: Nothing  Room Number: 25/25  PMD: Velasquez Rodriguez MD      HPI:  Pt is a 57 y.o. female who presents complaining of intermittent LLQ abd pain that has been ongoing for the past 2 weeks. The pt states she has been taking Norco and Morphine at home without improvement of the pain. The pt has a hx of metastatic rectal cancer. The pt's last radiation was in March 2018. The pt chose to stop taking the chemotherapy. Pt reports hematuria that began yesterday, clear vaginal discharge with some tinges of blood, and dysuria for the past several weeks. Pt denies cough, CP, SOA, vomiting and fever. The pt was seen at an [facility] several weeks ago for dysuria. The pt was discharged with Macrobid and her urine culture ended up being negative.    The pt's family acted as a  for the pt.  They were offered  and declined.    Duration: 2 weeks  Onset: Gradual  Timing: Intermittent  Location: LLQ abd  Radiation: None  Quality: \"Pain\"  Intensity/Severity: Moderate  Progression: Unchanged  Associated Symptoms: Hematuria that began yesterday, clear vaginal discharge, and dysuria for the past several weeks  Aggravating Factors: None stated  Alleviating Factors: None stated  Previous Episodes: The pt has a hx of metastatic rectal cancer. The pt's last radiation was in March 2018. The pt chose to stop taking the chemotherapy.  Treatment before arrival: The pt states she has been taking Norco and Morphine at home without improvement of the pain.    PAST MEDICAL HISTORY  Active Ambulatory Problems     Diagnosis Date Noted   • Malignant neoplasm of rectum (CMS/HCC) 12/09/2016   • Neutropenia (CMS/HCC) 02/10/2017   • Vitamin B12 deficiency anemia 02/10/2017   • Fitting and adjustment of vascular catheter 03/08/2017   • Neoplastic malignant related fatigue 09/11/2017   • Rectal/anal hemorrhage " 01/29/2018   • Cancer associated pain 02/19/2018     Resolved Ambulatory Problems     Diagnosis Date Noted   • Hypotension 11/01/2016   • Anemia, blood loss 11/01/2016   • Iron deficiency anemia due to chronic blood loss 12/09/2016   • Dehydration 12/23/2016     Past Medical History:   Diagnosis Date   • Abdominal pain    • Adenocarcinoma, lung (CMS/HCC) 2017   • Anxiety    • Anxiety and depression    • Arthritis    • Colon polyps 10/06/2016   • Dehydration    • Depression    • Diarrhea    • Dyspepsia    • Fatigue    • GERD (gastroesophageal reflux disease)    • Hemorrhoids    • Hx antineoplastic chemo    • Hyperlipidemia    • Hypertension    • Insomnia    • Iron deficiency anemia    • Neutropenia (CMS/HCC)    • Rectal cancer (CMS/HCC) 2016   • Rectal/anal hemorrhage    • Unintentional weight loss    • Urine leukocytes    • Vitamin B12 deficiency anemia        PAST SURGICAL HISTORY  Past Surgical History:   Procedure Laterality Date   • ABDOMINAL PERINEAL RESECTION N/A 10/31/2016    Procedure: ABDOMINAL PERINEAL RESECTION LAPAROSCOPIC, MOBILIZATION OF OMENTAL PEDICLE FLAP;  Surgeon: Gaudencio Clemente MD;  Location: [facility] MAIN OR;  Service:    • COLONOSCOPY N/A 10/6/2016    Procedure: COLONOSCOPY INTO CECUM WITH COLD BX POLYPECTOMIES AND COLD BX;  Surgeon: Seven Keller MD;  Location: [facility] ENDOSCOPY;  Service:    • ENDOSCOPY N/A 10/6/2016    Procedure: ESOPHAGOGASTRODUODENOSCOPY WITH BIOPSIES;  Surgeon: Seven Keller MD;  Location: [facility] ENDOSCOPY;  Service:    • FOOT SURGERY Bilateral     AT 8 YEARS OLD   • SD INSJ TUNNELED CVC W/O SUBQ PORT/ AGE 5 YR/> N/A 1/11/2017    Procedure: INSERTION VENOUS ACCESS DEVICE;  Surgeon: Geo Lovett Jr., MD;  Location: [facility] MAIN OR;  Service: General   • SIGMOIDOSCOPY N/A 10/17/2016    Procedure: SIGMOIDOSCOPY FLEXIBLE TO SIGMOID COLON;  Surgeon: Gaudencio Clemente MD;  Location: [facility] ENDOSCOPY;  Service:    • TRANSRECTAL ULTRASOUND N/A 10/17/2016     Procedure: ENDORECTAL ULTRASOUND;  Surgeon: Gaudencio Clemente MD;  Location: [facility] ENDOSCOPY;  Service:    • VULVECTOMY Right 10/31/2016    Procedure: POSTERIOR VAGINECTOMY REPAIR;  Surgeon: Marisa Burris MD;  Location: [facility] MAIN OR;  Service:        FAMILY HISTORY  Family History   Problem Relation Age of Onset   • Diabetes Mother    • Stroke Father    • Dementia Father    • Anemia Father         Chronic   • No Known Problems Brother    • No Known Problems Daughter    • No Known Problems Son    • No Known Problems Sister    • No Known Problems Sister    • No Known Problems Sister    • No Known Problems Brother        SOCIAL HISTORY  Social History     Social History   • Marital status: Legally      Spouse name: N/A   • Number of children: 2   • Years of education: 12     Occupational History   • DISABLED      Social History Main Topics   • Smoking status: Former Smoker     Types: Cigarettes     Quit date: 8/14/2006   • Smokeless tobacco: Never Used      Comment: Off and on   • Alcohol use No   • Drug use: No   • Sexual activity: Defer     Other Topics Concern   • Not on file     Social History Narrative    Lives alone . Accompanied by daughter Candelaria who can transport patient to and from appointments. Translater with patient Robinsonangel       ALLERGIES  Adhesive tape    REVIEW OF SYSTEMS  Review of Systems   Constitutional: Negative for chills and fever.   HENT: Negative for rhinorrhea and sore throat.    Eyes: Negative for visual disturbance.   Respiratory: Negative for cough and shortness of breath.    Cardiovascular: Negative for chest pain, palpitations and leg swelling.   Gastrointestinal: Positive for abdominal pain (Intermittent LLQ). Negative for diarrhea and vomiting.   Endocrine: Negative.    Genitourinary: Positive for dysuria, hematuria (Began yesterday) and vaginal discharge (Clear). Negative for decreased urine volume and frequency.   Musculoskeletal: Negative for neck pain.   Skin: Negative  for rash.   Neurological: Negative for syncope and headaches.   Psychiatric/Behavioral: Negative.    All other systems reviewed and are negative.      PHYSICAL EXAM  ED Triage Vitals   Temp Heart Rate Resp BP SpO2   07/12/18 1924 07/12/18 1924 07/12/18 1924 07/12/18 2019 07/12/18 1924   98.7 °F (37.1 °C) 102 18 134/77 96 %      Temp src Heart Rate Source Patient Position BP Location FiO2 (%)   07/12/18 1924 07/12/18 1924 07/12/18 2019 07/12/18 2019 --   Tympanic Monitor Sitting Right arm        Physical Exam   Constitutional: She is oriented to person, place, and time.  Non-toxic appearance. She appears distressed (mild).   HENT:   Head: Normocephalic and atraumatic.   Eyes: EOM are normal.   Neck: Normal range of motion. Neck supple.   Cardiovascular: Normal rate, regular rhythm, normal heart sounds and intact distal pulses.    No murmur heard.  Pulses:       Posterior tibial pulses are 2+ on the right side, and 2+ on the left side.   Pulmonary/Chest: Effort normal and breath sounds normal. No respiratory distress.   Abdominal: Soft. Bowel sounds are normal. There is no rebound and no guarding. No hernia.   The pt has a colostomy. The pt has tenderness to the left inguinal area without hernia/swelling.  Adenopathy is noted to left inguinal area.   Musculoskeletal: Normal range of motion. She exhibits no edema.   Neurological: She is alert and oriented to person, place, and time.   Skin: Skin is warm and dry.   Psychiatric: Affect normal.   Nursing note and vitals reviewed.      LAB RESULTS  Lab Results (last 24 hours)     Procedure Component Value Units Date/Time    CBC & Differential [[id number]] Collected:  07/12/18 2126    Specimen:  Blood Updated:  07/12/18 2141    Narrative:       The following orders were created for panel order CBC & Differential.  Procedure                               Abnormality         Status                     ---------                               -----------         ------                      CBC Auto Differential[[id number]]        Abnormal            Final result                 Please view results for these tests on the individual orders.    Comprehensive Metabolic Panel [[id number]]  (Abnormal) Collected:  07/12/18 2126    Specimen:  Blood Updated:  07/12/18 2204     Glucose 105 (H) mg/dL      BUN 11 mg/dL      Creatinine 0.71 mg/dL      Sodium 142 mmol/L      Potassium 4.0 mmol/L      Chloride 103 mmol/L      CO2 28.3 mmol/L      Calcium 9.8 mg/dL      Total Protein 7.8 g/dL      Albumin 4.50 g/dL      ALT (SGPT) 34 (H) U/L      AST (SGOT) 41 (H) U/L      Alkaline Phosphatase 89 U/L      Total Bilirubin 0.4 mg/dL      eGFR Non African Amer 85 mL/min/1.73      Globulin 3.3 gm/dL      A/G Ratio 1.4 g/dL      BUN/Creatinine Ratio 15.5     Anion Gap 10.7 mmol/L     Lipase [[id number]]  (Normal) Collected:  07/12/18 2126    Specimen:  Blood Updated:  07/12/18 2204     Lipase 21 U/L     CBC Auto Differential [[id number]]  (Abnormal) Collected:  07/12/18 2126    Specimen:  Blood Updated:  07/12/18 2141     WBC 3.96 (L) 10*3/mm3      RBC 4.68 10*6/mm3      Hemoglobin 12.5 g/dL      Hematocrit 38.9 %      MCV 83.1 fL      MCH 26.7 (L) pg      MCHC 32.1 (L) g/dL      RDW 14.0 (H) %      RDW-SD 42.1 fl      MPV 8.9 fL      Platelets 139 (L) 10*3/mm3      Neutrophil % 63.7 %      Lymphocyte % 24.5 %      Monocyte % 9.8 %      Eosinophil % 1.5 %      Basophil % 0.5 %      Immature Grans % 0.0 %      Neutrophils, Absolute 2.52 10*3/mm3      Lymphocytes, Absolute 0.97 10*3/mm3      Monocytes, Absolute 0.39 10*3/mm3      Eosinophils, Absolute 0.06 10*3/mm3      Basophils, Absolute 0.02 10*3/mm3      Immature Grans, Absolute 0.00 10*3/mm3     Urinalysis With Culture If Indicated - Urine, Clean Catch [[id number]]  (Abnormal) Collected:  07/12/18 2126    Specimen:  Urine from Urine, Clean Catch Updated:  07/12/18 6036     Color, UA Yellow     Appearance, UA Clear     pH, UA 5.5     Specific [place], UA 1.007      Glucose, UA Negative     Ketones, UA Negative     Bilirubin, UA Negative     Blood, UA Trace (A)     Protein, UA Negative     Leuk Esterase, UA Trace (A)     Nitrite, UA Negative     Urobilinogen, UA 0.2 E.U./dL    Urinalysis, Microscopic Only - Urine, Clean Catch [[id number]] Collected:  07/12/18 2126    Specimen:  Urine from Urine, Clean Catch Updated:  07/12/18 2152     RBC, UA 0-2 /HPF      WBC, UA 0-2 /HPF      Bacteria, UA None Seen /HPF      Squamous Epithelial Cells, UA 0-2 /HPF      Hyaline Casts, UA None Seen /LPF      Methodology Automated Microscopy          I ordered the above labs and reviewed the results    RADIOLOGY  CT Abdomen Pelvis With Contrast   Preliminary Result   1.  Abnormal examination with worsening metastatic disease to the lungs   and inguinal lymph nodes.    2.  An enhancing lesion in the rectal bed measures 5 x 3.5 cm with   interval increase in size concerning for worsening recurrent disease.   The mass extends anteriorly and may involve the uterine cervix.   3.  Mild right hydronephrosis and hydroureter involving the proximal two   thirds of the ureter, the distal third right ureter is collapsed.                   I ordered the above noted radiological studies. Interpreted by radiologist. Reviewed by me in PACS.       PROCEDURES  Procedures      PROGRESS AND CONSULTS  ED Course as of Jul 13 0100   Thu Jul 12, 2018 2055 Pt with lower abd pain, worse in LLQ x2 weeks. Pt previously treated with Macrobid for UTI. Culture showed no growth and Macrobid was stopped. She has had some pink colored urine. Pt has hx of rectal cancer and is not currently in chemo. She had previous colon resection 6 months ago with osotomy. No N/V/D, fever, chills, flank pain. On exam, there is tenderness of the L inguinal area. There is a palpable 3cm mass along the L inguinal fold. She has a colostomy in place. Heart is RRR and lungs are CTAB. She is A&Ox3. Exam otherwise benign.  [AG]      ED Course User  Index  [AG] Jude Triana     2212  CT Abd ordered for further evaluation.    0023  BP- 119/72 HR- 80 Temp- 98.7 °F (37.1 °C) (Tympanic) O2 sat- 95%    Rechecked pt, she is resting comfortably. Discussed the CT results with the pt and family including the larger mass and some obstruction of the right kidney. Discussed the plan to admit the pt for further evaluation into her mass. The pt states she would like more time to think about this decision.    0051  BP- 111/75 HR- 87 Temp- 98.7 °F (37.1 °C) (Tympanic) O2 sat- 94%    Rechecked pt, she has decided that she would like to go home with the plan to f/u with Dr. Paez on Monday. I offered the pt pain medication before discharging her, and she agreed with the plan. Discussed the plan to call Dr. Paez to inform her of the pt's stay. Discussed the plan to discharge the pt with the plan to have her f/u on Monday with her scheduled appointment. The pt understands and agrees with the plan.    0055  Dilaudid ordered for pain. Call placed to Hematology.    0102  Received a call from Dr. Paez and discussed pt's case. Dr. Paez agreed with plan to f/u with the pt on Monday.      MEDICAL DECISION MAKING  Results were reviewed/discussed with the patient and they were also made aware of online access. Pt also made aware that some labs, such as cultures, will not be resulted during ER visit and follow up with PMD is necessary.     MDM  Number of Diagnoses or Management Options  Hydronephrosis, unspecified hydronephrosis type:   Metastasis from rectal cancer (CMS/HCC) (Found in the pelvis):      Amount and/or Complexity of Data Reviewed  Clinical lab tests: ordered and reviewed (ALT - 34  AST - 41)  Tests in the radiology section of CPT®: ordered and reviewed (CT Abd - 1.  Abnormal examination with worsening metastatic disease to the lungs  and inguinal lymph nodes.   2.  An enhancing lesion in the rectal bed measures 5 x 3.5 cm with  interval increase in size concerning for  worsening recurrent disease.  The mass extends anteriorly and may involve the uterine cervix.  3.  Mild right hydronephrosis and hydroureter involving the proximal two  thirds of the ureter, the distal third right ureter is collapsed.  )  Discuss the patient with other providers: yes (Dr. Paez, Oncology)    Patient Progress  Patient progress: stable         DIAGNOSIS  Final diagnoses:   Metastasis from rectal cancer (CMS/HCC) (Found in the pelvis)   Hydronephrosis, unspecified hydronephrosis type       DISPOSITION  DISCHARGE    Patient discharged in stable condition.    Reviewed implications of results, diagnosis, meds, responsibility to follow up, warning signs and symptoms of possible worsening, potential complications and reasons to return to ER.    Patient/Family voiced understanding of above instructions.    Discussed plan for discharge, as there is no emergent indication for admission. Patient referred to primary care provider for BP management due to today's BP. Pt/family is agreeable and understands need for follow up and repeat testing.  Pt is aware that discharge does not mean that nothing is wrong but it indicates no emergency is present that requires admission and they must continue care with follow-up as given below or physician of their choice.     FOLLOW-UP  Pilar Paez MD  [address] [address]  [phone]    Schedule an appointment as soon as possible for a visit in 2 days  EVEN IF WELL         Medication List      No changes were made to your prescriptions during this visit.           Latest Documented Vital Signs:  As of 1:00 AM  BP- 111/75 HR- 87 Temp- 98.7 °F (37.1 °C) (Tympanic) O2 sat- 94%    --  Documentation assistance provided by evaristo Perry for Dr. Nicole.  Information recorded by the evaristo was done at my direction and has been verified and validated by me.       Neftali Perry  07/13/18 0103       Trina Nicole MD  07/14/18 0265

## 2018-07-16 NOTE — PROGRESS NOTES
Subjective     No ref. provider found     Diagnosis Plan   1. Malignant neoplasm of rectum (CMS/HCC)       Chief Complaint   Patient presents with   • Follow-up     s/p xrt 3/23/18 pelvis                                   Dear Dr. Paez:    I had the pleasure of [facility] Benigno Quispe  [facility].  She returns today for follow up now 4 months out from completion of her radiation therapy for metastatic rectal cancer.  She completed a palliative dose of 35Gy to the pelvis and rectum on 3/23/18. .  She had a CT abd and pelvis on 7/12/18 which unfortunately showed an increase in size of the rectal mass to 5 x 3.5 cm from 2.6 x 1.8 cm, concerning for worsening recurrent disease. The mass extends anteriorly and may involve the uterine cervix. Posteriorly and superiorly the mass extendsup to the lower proximal medial segments.  Today she states her pain is in her left groin and rectum.  She is on ms contin 30mg bid.  She went to ER due to the pain and they ordered these CT scans.  She has been off palliative chemo for several months.  She also reports some blood in her urine.     .  Review of Systems   Constitutional: Positive for activity change, appetite change and fatigue.   HENT: Negative.    Gastrointestinal: Positive for abdominal pain and rectal pain.   Genitourinary: Positive for hematuria.   Neurological: Negative.          Past Medical History:   Diagnosis Date   • Abdominal pain    • Adenocarcinoma, lung (CMS/HCC) 2017   • Anxiety    • Anxiety and depression    • Arthritis    • Colon polyps 10/06/2016   • Dehydration    • Depression    • Diarrhea    • Dyspepsia    • Fatigue    • GERD (gastroesophageal reflux disease)    • Hemorrhoids    • Hx antineoplastic chemo    • Hyperlipidemia    • Hypertension     history of  no meds now   • Insomnia    • Iron deficiency anemia    • Neutropenia (CMS/HCC)    • Rectal cancer (CMS/HCC) 2016   • Rectal/anal hemorrhage    • Unintentional weight loss    • Urine  leukocytes    • Vitamin B12 deficiency anemia          Past Surgical History:   Procedure Laterality Date   • ABDOMINAL PERINEAL RESECTION N/A 10/31/2016    Procedure: ABDOMINAL PERINEAL RESECTION LAPAROSCOPIC, MOBILIZATION OF OMENTAL PEDICLE FLAP;  Surgeon: Gaudencio Clemente MD;  Location: [facility] MAIN OR;  Service:    • COLONOSCOPY N/A 10/6/2016    Procedure: COLONOSCOPY INTO CECUM WITH COLD BX POLYPECTOMIES AND COLD BX;  Surgeon: Seven Keller MD;  Location: [facility] ENDOSCOPY;  Service:    • ENDOSCOPY N/A 10/6/2016    Procedure: ESOPHAGOGASTRODUODENOSCOPY WITH BIOPSIES;  Surgeon: Seven Keller MD;  Location: [facility] ENDOSCOPY;  Service:    • FOOT SURGERY Bilateral     AT 8 YEARS OLD   • WA INSJ TUNNELED CVC W/O SUBQ PORT/ AGE 5 YR/> N/A 1/11/2017    Procedure: INSERTION VENOUS ACCESS DEVICE;  Surgeon: Geo Lovett Jr., MD;  Location: [facility] MAIN OR;  Service: General   • SIGMOIDOSCOPY N/A 10/17/2016    Procedure: SIGMOIDOSCOPY FLEXIBLE TO SIGMOID COLON;  Surgeon: Gaudencio Clemente MD;  Location: [facility] ENDOSCOPY;  Service:    • TRANSRECTAL ULTRASOUND N/A 10/17/2016    Procedure: ENDORECTAL ULTRASOUND;  Surgeon: Gaudencio Clemente MD;  Location: [facility] ENDOSCOPY;  Service:    • VULVECTOMY Right 10/31/2016    Procedure: POSTERIOR VAGINECTOMY REPAIR;  Surgeon: Marisa Burris MD;  Location: [facility] MAIN OR;  Service:          Social History     Social History   • Marital status: Legally    • Number of children: 2   • Years of education: 12     Occupational History   • DISABLED      Social History Main Topics   • Smoking status: Former Smoker     Types: Cigarettes     Quit date: 8/14/2006   • Smokeless tobacco: Never Used      Comment: Off and on   • Alcohol use No   • Drug use: No   • Sexual activity: Defer     Other Topics Concern   • Not on file     Social History Narrative    Lives alone . Accompanied by daughter Candelaria who can transport patient to and from appointments. Translater  with patient Сергей         Family History   Problem Relation Age of Onset   • Diabetes Mother    • Stroke Father    • Dementia Father    • Anemia Father         Chronic   • No Known Problems Brother    • No Known Problems Daughter    • No Known Problems Son    • No Known Problems Sister    • No Known Problems Sister    • No Known Problems Sister    • No Known Problems Brother           Objective    Physical Exam   Constitutional: She is oriented to person, place, and time. She appears well-developed and well-nourished.   HENT:   Head: Normocephalic and atraumatic.   Eyes: EOM are normal.   Abdominal:   Ostomy left lower quadrant   Neurological: She is alert and oriented to person, place, and time.         Current Outpatient Prescriptions on File Prior to Visit   Medication Sig Dispense Refill   • clonazePAM (KlonoPIN) 1 MG tablet Take 1 tablet by mouth 2 (Two) Times a Day As Needed for Anxiety. 60 tablet 2   • HYDROcodone-acetaminophen (NORCO)  MG per tablet 1-2 tabs by mouth every 6 hours as needed for pain 180 tablet 0   • Morphine (MS CONTIN) 30 MG 12 hr tablet Take 1 tablet by mouth Every 8 (Eight) Hours. 90 tablet 0     Current Facility-Administered Medications on File Prior to Visit   Medication Dose Route Frequency Provider Last Rate Last Dose   • heparin flush (porcine) 100 UNIT/ML injection 500 Units  500 Units Intravenous PRN Robe Corral MD   500 Units at 04/03/17 0840   • heparin flush (porcine) 100 UNIT/ML injection 500 Units  500 Units Intravenous PRN Pilar Paez MD   500 Units at 05/30/17 0831   • heparin flush (porcine) 100 UNIT/ML injection 500 Units  500 Units Intravenous PRN Pilar Paez MD   500 Units at 09/25/17 1101   • heparin injection 500 Units  500 Units Intracatheter Q8H uJde Stewart MD   500 Units at 01/27/17 1024   • heparin injection 500 Units  500 Units Intracatheter Q8H Pilar Paez MD   500 Units at 02/27/17 1427   • sodium chloride 0.9 % flush 10 mL  10 mL  Intravenous PRN Robe Corral MD   10 mL at 04/03/17 0839   • sodium chloride 0.9 % flush 10 mL  10 mL Intravenous PRN Pilar Peaz MD   10 mL at 05/30/17 0831   • sodium chloride 0.9 % flush 10 mL  10 mL Intravenous PRN Pilar Paez MD   10 mL at 09/25/17 1100       ALLERGIES:    Allergies   Allergen Reactions   • Adhesive Tape Rash       /83   Pulse 106   Temp 97.5 °F (36.4 °C) (Tympanic)   Resp 16   SpO2 96%      Current Status 5/14/2018   ECOG score 0         Assessment/Plan   58 yo female with stage IV rectal cancer with progressive disease in the rectum and lungs now 4 months out from palliative radiation to the pelvis.  I have personally reviewed her imaging including recent CT scans and agree with the findings.  I offered the possibility of re-irradiation to the rectal mass, but explained that unfortunately, we would be limited in the dose we could deliver due to previous radiation.  She was not interested in any more radiation and does not think she wants any more chemotherapy.  I briefly discussed Hospice and they may consider.  She is scheduled to see Dr. Paez later today to discuss pain management.  I have not scheduled her a follow up with me but asked her to call with any questions or concerns.         Thank you very much for allowing me to participate in the care of this very pleasant patient.    Sincerely,      Zaynab Barraza MD

## 2018-07-16 NOTE — PROGRESS NOTES
[facility] GROUP OUTPATIENT FOLLOW UP CLINIC VISIT    REASON FOR FOLLOW-UP:    Malignant neoplasm of rectum    Staging form: Colon and Rectum, AJCC V7      Pathologic: Stage IIIC (T4b, N1b, cM0) - Signed by Jude Stewart MD on 12/9/2016        Staging comments: Suspected lung metastases.        Clinical: Stage TRUDY (T4b, N1b, M1a) - Signed by Jude Stewart MD on 12/19/2016  Kras mutation positive.  BRAF negative.  Microsatellite stable.      HISTORY OF PRESENT ILLNESS:  Benigno Quispe is a 57 y.o. female who was previously treated for metastatic rectal cancer. She's currently on palliative care and using MS Contin and Klonopin. She had recently called to increase her MS Continue and then patient was in the emergency department on 7/14/2018 with left lower quadrant abdominal pain and hematuria.  She had a CT scan performed that included a larger mass and some obstruction of the right kidney.  Patient declined admission to the hospital and is following up in the clinic today.  She reports that the morphine doesn't make her feel good but it does know the pain slightly.  She is continuing to use Norco when necessary.  She has a little bit of blood either from her vagina or urine and wears a pad.  She does not have any right sided pain and the left sided lower quadrant pain persists.  She reports that she had abnormal urination mainly including frequency and was treated with Macrobid which actually showed some improvement.  She had no frequency yesterday and reported normal urination.     Reviewed, confirmed and updated history (past medical, social and family)  ALLERGIES:  Allergies   Allergen Reactions   • Adhesive Tape Rash       MEDICATIONS:  The medication list has been reviewed with the patient by the medical assistant, and the list has been updated in the electronic medical record, which I reviewed.  Medication dosages and frequencies were confirmed to be accurate.    REVIEW OF SYSTEMS:  Review of Systems    Constitutional: Positive for fatigue. Negative for activity change, appetite change, chills, diaphoresis, fever and unexpected weight change.   HENT: Negative.    Eyes: Negative.    Respiratory: Negative for cough, chest tightness and shortness of breath.    Cardiovascular: Negative for chest pain and leg swelling.   Gastrointestinal: Positive for abdominal pain (left lower quadrant). Negative for abdominal distention, blood in stool, constipation, diarrhea, nausea, rectal pain and vomiting.   Endocrine: Negative.    Genitourinary: Positive for hematuria and vaginal bleeding. Negative for dyspareunia, dysuria and enuresis.   Musculoskeletal: Negative for arthralgias, joint swelling and myalgias.   Skin: Negative.    Allergic/Immunologic: Negative.    Neurological: Negative for light-headedness.   Hematological: Negative for adenopathy. Does not bruise/bleed easily.   Psychiatric/Behavioral: Negative for agitation, confusion and dysphoric mood. The patient is not nervous/anxious.      Objective     Vitals:    07/16/18 1555   BP: 126/84   Pulse: 92   Resp: 16   Temp: 98.8 °F (37.1 °C)   SpO2: 97%   GENERAL:  Well-developed, well-nourished female in no acute distress. Vital signs reviewed.   SKIN:  Warm, dry without rashes, purpura or petechiae.  EYES:  Pupils equal, round and reactive to light.  EOMs intact.  Conjunctivae normal.  HEAD:  Normocephalic.  EARS/NOSE/MOUTH/THROAT:  Hearing intact. Septum midline.  No excoriations or nasal discharge. No stomatitis or ulcers.  Lips normal. Oropharynx without lesions or exudates.  NECK:  Supple with good range of motion; no thyromegaly or masses  LYMPHATICS:  No cervical, supraclavicular, axillary adenopathy.  RESP:  Lungs clear to percussion and auscultation. Good airflow. Normal respiratory effort.   CARDIAC:  Regular rate and rhythm without murmurs, rubs or gallops. Normal S1,S2. No edema  GI:  Soft, nontender, no hepatosplenomegaly, normal bowel sounds, ostomy  present  MSK:  No clubbing or cyanosis, No joint swelling noted in hands  NEUROLOGICAL:  Cranial Nerves II-XII grossly intact.  No focal neurological deficits.  PSYCHIATRIC:  Normal affect and mood.  Alert and Oriented x 3.     DIAGNOSTIC DATA:  Results for orders placed or performed during the hospital encounter of 07/12/18   Comprehensive Metabolic Panel   Result Value Ref Range    Glucose 105 (H) 65 - 99 mg/dL    BUN 11 6 - 20 mg/dL    Creatinine 0.71 0.57 - 1.00 mg/dL    Sodium 142 136 - 145 mmol/L    Potassium 4.0 3.5 - 5.2 mmol/L    Chloride 103 98 - 107 mmol/L    CO2 28.3 22.0 - 29.0 mmol/L    Calcium 9.8 8.6 - 10.5 mg/dL    Total Protein 7.8 6.0 - 8.5 g/dL    Albumin 4.50 3.50 - 5.20 g/dL    ALT (SGPT) 34 (H) 1 - 33 U/L    AST (SGOT) 41 (H) 1 - 32 U/L    Alkaline Phosphatase 89 39 - 117 U/L    Total Bilirubin 0.4 0.1 - 1.2 mg/dL    eGFR Non African Amer 85 >60 mL/min/1.73    Globulin 3.3 gm/dL    A/G Ratio 1.4 g/dL    BUN/Creatinine Ratio 15.5 7.0 - 25.0    Anion Gap 10.7 mmol/L   Lipase   Result Value Ref Range    Lipase 21 13 - 60 U/L   CBC Auto Differential   Result Value Ref Range    WBC 3.96 (L) 4.50 - 10.70 10*3/mm3    RBC 4.68 3.90 - 5.20 10*6/mm3    Hemoglobin 12.5 11.9 - 15.5 g/dL    Hematocrit 38.9 35.6 - 45.5 %    MCV 83.1 80.5 - 98.2 fL    MCH 26.7 (L) 26.9 - 32.0 pg    MCHC 32.1 (L) 32.4 - 36.3 g/dL    RDW 14.0 (H) 11.7 - 13.0 %    RDW-SD 42.1 37.0 - 54.0 fl    MPV 8.9 6.0 - 12.0 fL    Platelets 139 (L) 140 - 500 10*3/mm3    Neutrophil % 63.7 42.7 - 76.0 %    Lymphocyte % 24.5 19.6 - 45.3 %    Monocyte % 9.8 5.0 - 12.0 %    Eosinophil % 1.5 0.3 - 6.2 %    Basophil % 0.5 0.0 - 1.5 %    Immature Grans % 0.0 0.0 - 0.5 %    Neutrophils, Absolute 2.52 1.90 - 8.10 10*3/mm3    Lymphocytes, Absolute 0.97 0.90 - 4.80 10*3/mm3    Monocytes, Absolute 0.39 0.20 - 1.20 10*3/mm3    Eosinophils, Absolute 0.06 0.00 - 0.70 10*3/mm3    Basophils, Absolute 0.02 0.00 - 0.20 10*3/mm3    Immature Grans, Absolute 0.00  0.00 - 0.03 10*3/mm3   Urinalysis With Culture If Indicated - Urine, Clean Catch   Result Value Ref Range    Color, UA Yellow Yellow, Straw    Appearance, UA Clear Clear    pH, UA 5.5 5.0 - 8.0    Specific Gravity, UA 1.007 1.005 - 1.030    Glucose, UA Negative Negative    Ketones, UA Negative Negative    Bilirubin, UA Negative Negative    Blood, UA Trace (A) Negative    Protein, UA Negative Negative    Leuk Esterase, UA Trace (A) Negative    Nitrite, UA Negative Negative    Urobilinogen, UA 0.2 E.U./dL 0.2 - 1.0 E.U./dL   Urinalysis, Microscopic Only - Urine, Clean Catch   Result Value Ref Range    RBC, UA 0-2 None Seen, 0-2 /HPF    WBC, UA 0-2 None Seen, 0-2 /HPF    Bacteria, UA None Seen None Seen /HPF    Squamous Epithelial Cells, UA 0-2 None Seen, 0-2 /HPF    Hyaline Casts, UA None Seen None Seen /LPF    Methodology Automated Microscopy    *I reviewed scans myself and concur with the below dictation.    Ct Abdomen Pelvis With Contrast    Result Date: 7/13/2018  Narrative: CT ABDOMEN AND PELVIS WITH CONTRAST.  TECHNIQUE: Radiation dose reduction techniques were utilized, including automated exposure control and exposure modulation based on body size. A routine CT scan of the abdomen and pelvis was performed with coronal and sagittal reconstructed images.  HISTORY: Abdominal pain.  COMPARISON: 2/12/2018.  FINDINGS: There are multiple metastatic deposits in both lungs, with interval increase in size, a mass in the posterior left lower lobe image 16 measures 3.7 cm, previously 1.5 cm.      Liver demonstrates homogeneous parenchyma, no definite mass. Unremarkable gallbladder.  Spleen is unremarkable. Pancreas is unremarkable, no pancreatic ductal dilatation. Adrenal glands are within normal limits.  No definite renal calculi. There is mild hydronephrosis and hydroureter involving the proximal two thirds right ureter, the distal third ureter is collapsed, no obstructing stone or mass is seen. Mild dilatation of the  left ureter, up to the level of the lower pelvis where a 2.3 x 1 cm soft tissue mass along the left pelvic wall based on the left piriformis muscle series 2 image 116. Urinary bladder is unremarkable.     Small bowel loops are within normal limits. An end colostomy is seen in the left lower quadrant. There is interval development of an enhancing mass in the rectal bed at the midline which measures 5 x 3.5 cm previously measured 2.6 x 1.8 cm, it extends anteriorly and may involve the uterine cervix and extends posteriorly up to the level of lower coccygeal segments. There is significant interval increase in size of this mass.  Uterus demonstrates calcified fibroids. Adnexa are unremarkable. Trace free fluid in the pelvis.  Interval increase in size of right inguinal lymph node from 2.5 x 1 0.5-3 0.4 x 1.7 cm. A few abnormal left inguinal lymph nodes measure up to 2 cm.          Impression: 1.  Abnormal examination with worsening metastatic disease to the lungs and inguinal lymph nodes. 2.  An enhancing lesion in the rectal bed measures 5 x 3.5 cm with interval increase in size previously measured 2.6 x 1.8 cm, concerning for worsening recurrent disease. The mass extends anteriorly and may involve the uterine cervix. Posteriorly and superiorly the mass extends up to the lower proximal medial segments. 3.  Mild right hydronephrosis and hydroureter involving the proximal two thirds of the ureter, the distal third right ureter is collapsed. 4.  Mild dilatation of the left ureter, the etiology may be an enhancing soft tissue mass adjacent along the left pelvic wall adjacent to the left ureter measuring 2.3 x 1 cm.  This report was finalized on 7/13/2018 11:50 PM by Dr. Nataly Mortensen M.D.        Assessment/Plan   ASSESSMENT/PLAN:  This is a 57 y.o. female with:  1.  Metastatic rectal cancer, Kras mutated: She had a resection on 10/31/2016 by [facility] and [place].  Unfortunately, PET scan shows evidence for local lydia  metastases as well as bilateral pulmonary metastases.    - Palliative FOLFIRI/Avastin started 2/10/17. Multiple dose reductions due to cytopenias. Stopped 5/2017 due to disease progression   - FOLFOX with Avastin initiated 5/23/2017. Patient self stopped 10/30/17 and was lost to follow up.    - Returned to clinic 2/19/18 with progressive metastatic disease with large local, painful recurrence and multiple pulmonary metastasis. Declined systemic therapy but agreed to radiation therapy; pain greatly improved after RT.   - Imaging in ER 7/2018 with progressive disease causing pain. She continues to desire comfort measures. Her daughters ask about other options and I printed out a handout of [employer], but patient desires to focus on comfort alone and thus we will not pursue this medication. Previously and re-discussed Hospice today, but patient declines.     2. Cancer related rectal pain, uncontrolled   - Discontinue MS Contin - made her feel poorly   - Add fentanyl 25 mcg transdermal every 72 hours.    - Continue Norco 10/325, 1-2 tabs every 6 hours prn pain   - Senna S 2 tabs nightly     3.  Anxiety.  Previously on Celexa and Ativan the patient self stopped.  We will continue her Klonopin which seems to help.  4.  Mild right hydronephrosis. Urinating normally now. If progresses or has difficulty urinating, we could consider stent for palliation.     PLAN:  1. Stop MS Contin  2. Start Fentanyl 25 mcg patch every 72 hours  3. Continue Norco  4. Continue Klonopin  5. Monitor symptoms  6. MD visit in 1 month.     I spent 30 minutes with the patient and her family with 25 minutes spent face-to-face counseling regarding goals of care, prognosis and options.    Pilar Paez MD

## 2018-07-25 NOTE — TELEPHONE ENCOUNTER
----- Message from Deena Joaquin sent at 7/25/2018  4:02 PM EDT -----  Contact: [phone]  Pt daughter calling said fentanyl patches not working wanted to go back on morphine helped.

## 2018-07-25 NOTE — TELEPHONE ENCOUNTER
Pt's daughter called and said pt wants to go back to MS Contin and doesn't want to continue using the Fentanyl because she said it doesn't work for her. Reviewed with Dr Paez and she is ok with. Verified with [facility] pharmacy and they said pt should only have 2 days of MS Contin left from her past script. New script routed to Dr Paez. Awaiting signature.

## 2018-08-09 NOTE — PROGRESS NOTES
[facility] GROUP OUTPATIENT FOLLOW UP CLINIC VISIT    REASON FOR FOLLOW-UP:    Malignant neoplasm of rectum    Staging form: Colon and Rectum, AJCC V7      Pathologic: Stage IIIC (T4b, N1b, cM0) - Signed by Jude Stewart MD on 12/9/2016        Staging comments: Suspected lung metastases.        Clinical: Stage TRUDY (T4b, N1b, M1a) - Signed by Jude Stewart MD on 12/19/2016  Kras mutation positive.  BRAF negative.  Microsatellite stable.      HISTORY OF PRESENT ILLNESS:  Benigno Quispe is a 58 y.o. female who was previously treated for metastatic rectal cancer. She's on comfort care. We tried fentanyl recently but she didn't tolerate it well and thus went back to MS Contin. Her pain is moderate, but she's only able to take MS Contin twice a day because she gets too sleepy during the day but awake at night when she takes it more often. The Morphine helps the pain, but doesn't alleviate it. She's using Norco as well. Using clonopin to help her sleep and to help with anxiety.  Complaints and left lower quadrant and groin area that improves with a heating pad.  She continues to have rectal drainage.        Reviewed, confirmed and updated history (past medical, social and family)   ALLERGIES:  Allergies   Allergen Reactions   • Adhesive Tape Rash       MEDICATIONS:  The medication list has been reviewed with the patient by the medical assistant, and the list has been updated in the electronic medical record, which I reviewed.  Medication dosages and frequencies were confirmed to be accurate.    REVIEW OF SYSTEMS:  Review of Systems   Constitutional: Positive for fatigue. Negative for activity change, appetite change, chills, diaphoresis, fever and unexpected weight change.   HENT: Negative.    Eyes: Negative.    Respiratory: Negative for cough, chest tightness and shortness of breath.    Cardiovascular: Negative for chest pain and leg swelling.   Gastrointestinal: Positive for abdominal pain (left lower quadrant),  blood in stool, diarrhea and rectal pain. Negative for abdominal distention, constipation, nausea and vomiting.   Endocrine: Negative.    Genitourinary: Negative for dyspareunia, dysuria, enuresis, hematuria and vaginal bleeding.   Musculoskeletal: Negative for arthralgias, joint swelling and myalgias.   Skin: Negative.    Allergic/Immunologic: Negative.    Neurological: Negative for light-headedness.   Hematological: Positive for adenopathy. Does not bruise/bleed easily.   Psychiatric/Behavioral: Positive for sleep disturbance. Negative for agitation, confusion and dysphoric mood. The patient is not nervous/anxious.      Objective     Vitals:    08/09/18 1621   BP: 134/92   Pulse: 86   Resp: 16   Temp: 97.9 °F (36.6 °C)   SpO2: 99%      GENERAL:  Well-developed, well-nourished female in no acute distress. Vital signs reviewed.   SKIN:  Warm, dry without rashes, purpura or petechiae.  EYES:  Pupils equal, round and reactive to light.  EOMs intact.  Conjunctivae normal.  HEAD:  Normocephalic.  NECK:  Supple with good range of motion; no thyromegaly or masses  LYMPHATICS:  No cervical, supraclavicular, axillary adenopathy.  RESP:  Lungs clear to percussion and auscultation. Good airflow. Normal respiratory effort.   CHEST: Mediport - Yes,   CARDIAC:  Regular rate and rhythm without murmurs, rubs or gallops. Normal S1,S2. No edema  GI:  Soft, nontender, no hepatosplenomegaly, normal bowel sounds  MSK:  No clubbing or cyanosis, No joint swelling noted in hands  PSYCHIATRIC:  Flat affect and mood; tearful. Alert and Oriented x 3.       DIAGNOSTIC DATA:  None to review    Assessment/Plan   ASSESSMENT/PLAN:  This is a 58 y.o. female with:  1.  Metastatic rectal cancer, Kras mutated: She had a resection on 10/31/2016 by [facility] and [place].  Unfortunately, PET scan shows evidence for local lydia metastases as well as bilateral pulmonary metastases.    - Palliative FOLFIRI/Avastin started 2/10/17. Multiple dose reductions  due to cytopenias. Stopped 5/2017 due to disease progression   - FOLFOX with Avastin initiated 5/23/2017. Patient self stopped 10/30/17 and was lost to follow up.    - Returned to clinic 2/19/18 with progressive metastatic disease with large local, painful recurrence and multiple pulmonary metastasis. Declined systemic therapy but agreed to radiation therapy; pain greatly improved after RT.   - Imaging in ER 7/2018 with progressive disease causing pain. Patient desired comfort care but declined hospice. I am managing symptoms.     2. Cancer related rectal pain, uncontrolled   - Continue MS Contin 30 mg q 12 hours.    - Stopped fentanyl - didn't tolerate.     - Continue Norco 10/325, 1-2 tabs every 6 hours prn pain   - Senna S 2 tabs nightly    - Use heating pad to left groin; can add lidocaine patch as well.     3.  Anxiety.  Previously on Celexa and Ativan the patient self stopped.  We will continue her Klonopin which seems to help.  4.  Mild right hydronephrosis. If progresses or has difficulty urinating, we could consider stent for palliation. Continues to have normal urination  5. RIght    PLAN:  1. Continue comfort care (declines Hospice - may be interested in future)  2. MS Contin 30 mg q 12 hours, Norco prn.   3. Add Lidocaine patch.   4. Continue Klonopin  5. Monitor symptoms  6. MD visit in 2 months with port flush.         Pilar Paez MD

## 2018-08-13 NOTE — PROGRESS NOTES
"Lidocaine Patches approved from 8/9/18 to \"until further notice\"-[facility] notified [phone]  "

## 2018-08-13 NOTE — PROGRESS NOTES
Fax rec from [facility] stating [facility] has started a PA for pts Lidocaine patches. I have completed the request and submitted to [facility].    Awaiting decision.

## 2018-10-08 NOTE — PROGRESS NOTES
[facility] GROUP OUTPATIENT FOLLOW UP CLINIC VISIT    REASON FOR FOLLOW-UP:    Malignant neoplasm of rectum    Staging form: Colon and Rectum, AJCC V7      Pathologic: Stage IIIC (T4b, N1b, cM0) - Signed by Jude Stewart MD on 12/9/2016        Staging comments: Suspected lung metastases.        Clinical: Stage TRUDY (T4b, N1b, M1a) - Signed by Jude Stewart MD on 12/19/2016  Kras mutation positive.  BRAF negative.  Microsatellite stable.      HISTORY OF PRESENT ILLNESS:  Benigno Quispe is a 58 y.o. female who was previously treated for metastatic rectal cancer. She's on comfort care. We tried fentanyl recently but she didn't tolerate it well and thus went back to MS Contin.     Complaints today of frequent urination with mild burning. Poor sleep. Pain controlled with regimen. Complaints of enlarging rectal mass that causes pressure at some times.       Reviewed, confirmed and updated history (past medical, social and family)   ALLERGIES:  Allergies   Allergen Reactions   • Adhesive Tape Rash       MEDICATIONS:  The medication list has been reviewed with the patient by the medical assistant, and the list has been updated in the electronic medical record, which I reviewed.  Medication dosages and frequencies were confirmed to be accurate.    REVIEW OF SYSTEMS:  Review of Systems   Constitutional: Positive for fatigue. Negative for activity change, appetite change, chills, diaphoresis, fever and unexpected weight change.   HENT: Negative.    Eyes: Negative.    Respiratory: Negative for cough, chest tightness and shortness of breath.    Cardiovascular: Negative for chest pain and leg swelling.   Gastrointestinal: Positive for abdominal pain, blood in stool, diarrhea and rectal pain. Negative for abdominal distention, constipation, nausea and vomiting.   Endocrine: Negative.    Genitourinary: Positive for vaginal discharge. Negative for dyspareunia, dysuria, enuresis, hematuria and vaginal bleeding.   Musculoskeletal:  Positive for arthralgias. Negative for joint swelling and myalgias.   Skin: Negative.    Allergic/Immunologic: Negative.    Neurological: Negative for light-headedness.   Hematological: Positive for adenopathy. Does not bruise/bleed easily.   Psychiatric/Behavioral: Positive for sleep disturbance. Negative for agitation, confusion and dysphoric mood. The patient is nervous/anxious.      Objective     Vitals:    10/08/18 1506   BP: 126/86   Pulse: 108   Resp: 16   Temp: 98.6 °F (37 °C)   SpO2: 97%      GENERAL:  Well-developed, well-nourished female in no acute distress. Vital signs reviewed.   SKIN:  Warm, dry without rashes, purpura or petechiae.  EYES:  Pupils equal, round and reactive to light.  EOMs intact.  Conjunctivae normal.  HEAD:  Normocephalic.  NECK:  Supple with good range of motion; no thyromegaly or masses  LYMPHATICS:  No cervical, supraclavicular, axillary adenopathy.  RESP:  Lungs clear to percussion and auscultation. Good airflow. Normal respiratory effort.   CHEST: Mediport - Yes,   CARDIAC:  Regular rate and rhythm without murmurs, rubs or gallops. Normal S1,S2. No edema  GI:  Soft, nontender, no hepatosplenomegaly, normal bowel sounds  MSK:  No clubbing or cyanosis, No joint swelling noted in hands  PSYCHIATRIC:  Flat affect and mood; tearful. Alert and Oriented x 3.       DIAGNOSTIC DATA:  None to review    Assessment/Plan   ASSESSMENT/PLAN:  This is a 58 y.o. female with:  1.  Metastatic rectal cancer, Kras mutated: She had a resection on 10/31/2016 by [facility] and [place].  Unfortunately, PET scan shows evidence for local lydia metastases as well as bilateral pulmonary metastases.    - Palliative FOLFIRI/Avastin started 2/10/17. Multiple dose reductions due to cytopenias. Stopped 5/2017 due to disease progression   - FOLFOX with Avastin initiated 5/23/2017. Patient self stopped 10/30/17 and was lost to follow up.    - Returned to clinic 2/19/18 with progressive metastatic disease with large  local, painful recurrence and multiple pulmonary metastasis. Declined systemic therapy but agreed to radiation therapy; pain greatly improved after RT.   - Imaging in ER 7/2018 with progressive disease causing pain. Patient desired comfort care but declined hospice. I am managing symptoms.    - Symptoms from her rectal cancer seem to be progressing locally and I suspect she will need hospice soon.     2. Cancer related rectal pain, uncontrolled   - Continue MS Contin 30 mg q 12 hours.    - Stopped fentanyl - didn't tolerate.     - Continue Norco 10/325, 1-2 tabs every 6 hours prn pain   - Senna S 2 tabs nightly    - Use heating pad to left groin; can add lidocaine patch as well - improved as of visit 10/2018.    3.  Anxiety.  Previously on Celexa and Ativan the patient self stopped.  We will continue her Klonopin which seems to help.  4.  Mild right hydronephrosis. If progresses or has difficulty urinating, we could consider stent for palliation. Continues to have normal urination  5. Insomnia. Add Ambien 5 mg nightly. Can increase to 10 mg if needed but will call and notify us if she needs higher dose   6. Dysuria. Bactrim bid for 7 days; treat with longer course as she may have       PLAN:  1. Continue comfort care (declines Hospice - may be interested in future); she is starting to have more localized symptoms likely secondary to disease progression.   2. MS Contin 30 mg q 12 hours, Norco prn.  - Continue  3.  Lidocaine patch.   4. Continue Klonopin  5. Monitor symptoms  6. MD visit in 6 weeks with port flush.   7. Ambien 5-10 mg nightly for insomnia  8. Bactrim.         Pilar Paez MD

## 2018-10-16 NOTE — TELEPHONE ENCOUNTER
Pt's daughter called requesting refill for ms contin. Per Dr. Paez's note from 10/9/18, pt is to take MS contin 30mg q12h. Refill request sent to Dr. Gimenez (md #2) Pt's daughter informed script will be sent to [facility] once MD signs. She v/u.

## 2018-10-16 NOTE — TELEPHONE ENCOUNTER
----- Message from Deena Joaquin sent at 10/16/2018  4:14 PM EDT -----  Contact: [phone]  Pt daughter asking about getting refill for Morphine [facility] [address] is her pharmacy of choice.

## 2018-11-14 NOTE — TELEPHONE ENCOUNTER
----- Message from Clary Serrano sent at 11/14/2018  3:52 PM EST -----  [phone]  Rafaela  Daughter  sd you can leave voice mail    Needs a refill of her morphine  [facility]  [address]  Has appt on 19th but only has a couple left.  Call her when it's  Been done.

## 2018-11-14 NOTE — TELEPHONE ENCOUNTER
----- Message from Clary Serrano sent at 11/14/2018  3:52 PM EST -----  [phone]  Rafaela  Daughter  sd you can leave voice mail    Needs a refill of her morphine  [facility]  [address]  Has appt on 19th but only has a couple left.  Call her when it's  Been done.

## 2018-11-14 NOTE — TELEPHONE ENCOUNTER
MS Contin script sent to Dr. Paez for signature. Pt  Son Kevin was informed the script was sent to Dr. Paez for approval. He v/U and had no further questions.

## 2018-11-19 NOTE — PROGRESS NOTES
[facility] GROUP OUTPATIENT FOLLOW UP CLINIC VISIT    REASON FOR FOLLOW-UP:    Malignant neoplasm of rectum    Staging form: Colon and Rectum, AJCC V7      Pathologic: Stage IIIC (T4b, N1b, cM0) - Signed by Jude Stewart MD on 12/9/2016        Staging comments: Suspected lung metastases.        Clinical: Stage TRUDY (T4b, N1b, M1a) - Signed by Jude Stewart MD on 12/19/2016  Kras mutation positive.  BRAF negative.  Microsatellite stable.      HISTORY OF PRESENT ILLNESS:  Benigno Quispe is a 58 y.o. female who was previously treated for metastatic rectal cancer. She's on comfort care. We tried fentanyl recently but she didn't tolerate it well and thus went back to MS Contin.     Complaints today of frequent urination with mild burning. Poor sleep. Pain controlled with regimen. Complaints of enlarging rectal mass that causes pressure at some times.     Interval history: Patient's pain is still present and its more rectal pressure.  She has refused any kind of chemotherapy for her metastatic rectal cancer . did she used  to see Dr. Paez.   We discussed about hospice follow-up at she refuses.      Reviewed, confirmed and updated history (past medical, social and family)   ALLERGIES:  Allergies   Allergen Reactions   • Adhesive Tape Rash       MEDICATIONS:  The medication list has been reviewed with the patient by the medical assistant, and the list has been updated in the electronic medical record, which I reviewed.  Medication dosages and frequencies were confirmed to be accurate.    REVIEW OF SYSTEMS:  Review of Systems   Constitutional: Positive for fatigue. Negative for activity change, appetite change, chills, diaphoresis, fever and unexpected weight change.   HENT: Negative.    Eyes: Negative.    Respiratory: Negative for cough, chest tightness and shortness of breath.    Cardiovascular: Negative for chest pain and leg swelling.   Gastrointestinal: Positive for blood in stool, diarrhea and rectal pain. Negative  for abdominal distention, constipation, nausea and vomiting.   Endocrine: Negative.    Genitourinary: Positive for vaginal discharge. Negative for dyspareunia, dysuria, enuresis, hematuria and vaginal bleeding.   Musculoskeletal: Positive for arthralgias. Negative for joint swelling and myalgias.   Skin: Negative.    Allergic/Immunologic: Negative.    Neurological: Negative for light-headedness.   Hematological: Positive for adenopathy. Does not bruise/bleed easily.   Psychiatric/Behavioral: Positive for sleep disturbance. Negative for agitation, confusion and dysphoric mood. The patient is nervous/anxious.    There is no change in her review of systems as of November 19, 2018    Objective     Vitals:    11/19/18 1606   BP: 134/79   Pulse: 94   Resp: 16   Temp: 98 °F (36.7 °C)   SpO2: 93%      GENERAL:  Well-developed, well-nourished female in no acute distress. Vital signs reviewed.   SKIN:  Warm, dry without rashes, purpura or petechiae.  EYES:  Pupils equal, round and reactive to light.  EOMs intact.  Conjunctivae normal.  HEAD:  Normocephalic.  NECK:  Supple with good range of motion; no thyromegaly or masses  LYMPHATICS:  No cervical, supraclavicular, axillary adenopathy.  RESP:  Lungs clear to percussion and auscultation. Good airflow. Normal respiratory effort.   CHEST: Mediport - Yes,   CARDIAC:  Regular rate and rhythm without murmurs, rubs or gallops. Normal S1,S2. No edema  GI:  Soft, nontender, no hepatosplenomegaly, normal bowel sounds  MSK:  No clubbing or cyanosis, No joint swelling noted in hands  PSYCHIATRIC:  Flat affect and mood; tearful. Alert and Oriented x 3.       DIAGNOSTIC DATA:  None to review    Assessment/Plan   ASSESSMENT/PLAN:  This is a 58 y.o. female with:  1.  Metastatic rectal cancer, Kras mutated: She had a resection on 10/31/2016 by [facility] and [place].  Unfortunately, PET scan shows evidence for local lydia metastases as well as bilateral pulmonary metastases.    - Palliative  FOLFIRI/Avastin started 2/10/17. Multiple dose reductions due to cytopenias. Stopped 5/2017 due to disease progression   - FOLFOX with Avastin initiated 5/23/2017. Patient self stopped 10/30/17 and was lost to follow up.    - Returned to clinic 2/19/18 with progressive metastatic disease with large local, painful recurrence and multiple pulmonary metastasis. Declined systemic therapy but agreed to radiation therapy; pain greatly improved after RT.   - Imaging in ER 7/2018 with progressive disease causing pain. Patient desired comfort care but declined hospice. I am managing symptoms.    - Symptoms from her rectal cancer seem to be progressing locally and I suspect she will need hospice soon.     2. Cancer related rectal pain, uncontrolled   - Continue MS Contin 30 mg q 12 hours.    - Stopped fentanyl - didn't tolerate.     - Continue Norco 10/325, 1-2 tabs every 6 hours prn pain   - Senna S 2 tabs nightly    - Use heating pad to left groin; can add lidocaine patch as well - improved as of visit 10/2018.    3.  Anxiety.  Previously on Celexa and Ativan the patient self stopped.  We will continue her Klonopin which seems to help.  4.  Mild right hydronephrosis. If progresses or has difficulty urinating, we could consider stent for palliation. Continues to have normal urination  5. Insomnia. Add Ambien 5 mg nightly. Can increase to 10 mg if needed but will call and notify us if she needs higher dose   6.  Patient refuses chemotherapy and refuses hospice at this time.      PLAN:  1. Continue comfort care (declines Hospice - may be interested in future); she is starting to have more localized symptoms likely secondary to disease progression.   2. MS Contin 30 mg q 12 hours, Norco prn.  - Continue  3.  Lidocaine patch.   4. Continue Klonopin  5. Monitor symptoms  6. MD visit in 6 weeks with port flush.   7. Ambien 5-10 mg nightly for insomnia  8. Follow-up with me in 2 months.    Luz Gusman MD.         Luz  MD Uzma

## 2018-12-14 NOTE — TELEPHONE ENCOUNTER
Script routed to Dr. MOYER for sig.  Called patient, no answer.     ----- Message from Deena Joaquin sent at 12/14/2018  9:53 AM EST -----  Contact: [phone]  Pt needs refill of Morphine.

## 2019-01-01 ENCOUNTER — TELEPHONE (OUTPATIENT)
Dept: ONCOLOGY | Facility: HOSPITAL | Age: 59
End: 2019-01-01

## 2019-01-01 ENCOUNTER — INFUSION (OUTPATIENT)
Dept: ONCOLOGY | Facility: HOSPITAL | Age: 59
End: 2019-01-01

## 2019-01-01 ENCOUNTER — OFFICE VISIT (OUTPATIENT)
Dept: ONCOLOGY | Facility: CLINIC | Age: 59
End: 2019-01-01

## 2019-01-01 ENCOUNTER — HOSPITAL ENCOUNTER (OUTPATIENT)
Dept: INFUSION THERAPY | Facility: HOSPITAL | Age: 59
Setting detail: INFUSION SERIES
Discharge: HOME | End: 2019-01-18
Attending: INTERNAL MEDICINE

## 2019-01-01 ENCOUNTER — LAB (OUTPATIENT)
Dept: ONCOLOGY | Facility: HOSPITAL | Age: 59
End: 2019-01-01

## 2019-01-01 VITALS
SYSTOLIC BLOOD PRESSURE: 159 MMHG | OXYGEN SATURATION: 90 % | TEMPERATURE: 97.9 F | HEART RATE: 110 BPM | RESPIRATION RATE: 20 BRPM | BODY MASS INDEX: 26.08 KG/M2 | HEIGHT: 66 IN | WEIGHT: 162.3 LBS | DIASTOLIC BLOOD PRESSURE: 84 MMHG

## 2019-01-01 VITALS
HEART RATE: 75 BPM | SYSTOLIC BLOOD PRESSURE: 120 MMHG | TEMPERATURE: 97.3 F | RESPIRATION RATE: 22 BRPM | OXYGEN SATURATION: 95 % | DIASTOLIC BLOOD PRESSURE: 81 MMHG

## 2019-01-01 DIAGNOSIS — D51.8 OTHER VITAMIN B12 DEFICIENCY ANEMIA: ICD-10-CM

## 2019-01-01 DIAGNOSIS — C20 MALIGNANT NEOPLASM OF RECTUM (HCC): ICD-10-CM

## 2019-01-01 DIAGNOSIS — C20 MALIGNANT NEOPLASM OF RECTUM (HCC): Primary | ICD-10-CM

## 2019-01-01 DIAGNOSIS — Z45.2 FITTING AND ADJUSTMENT OF VASCULAR CATHETER: ICD-10-CM

## 2019-01-01 LAB
ABO + RH BLD: NORMAL
ABO + RH BLD: NORMAL
ABO GROUP BLD: NORMAL
ALBUMIN SERPL-MCNC: 3.3 G/DL (ref 3.5–5.2)
ALBUMIN/GLOB SERPL: 0.9 G/DL (ref 1.1–2.4)
ALP SERPL-CCNC: 72 U/L (ref 38–116)
ALT SERPL W P-5'-P-CCNC: <5 U/L (ref 0–33)
ANION GAP SERPL CALCULATED.3IONS-SCNC: 16.1 MMOL/L
AST SERPL-CCNC: 17 U/L (ref 0–32)
BASOPHILS # BLD AUTO: 0.03 10*3/MM3 (ref 0–0.1)
BASOPHILS NFR BLD AUTO: 0.5 % (ref 0–1.1)
BH BB BLOOD EXPIRATION DATE: NORMAL
BH BB BLOOD EXPIRATION DATE: NORMAL
BH BB BLOOD TYPE BARCODE: 6200
BH BB BLOOD TYPE BARCODE: 6200
BH BB DISPENSE STATUS: NORMAL
BH BB DISPENSE STATUS: NORMAL
BH BB PRODUCT CODE: NORMAL
BH BB PRODUCT CODE: NORMAL
BH BB UNIT NUMBER: NORMAL
BH BB UNIT NUMBER: NORMAL
BILIRUB SERPL-MCNC: 0.3 MG/DL (ref 0.1–1.2)
BLD GP AB SCN SERPL QL: NEGATIVE
BUN BLD-MCNC: 44 MG/DL (ref 6–20)
BUN/CREAT SERPL: 7.1 (ref 7.3–30)
CALCIUM SPEC-SCNC: 8.8 MG/DL (ref 8.5–10.2)
CHLORIDE SERPL-SCNC: 99 MMOL/L (ref 98–107)
CO2 SERPL-SCNC: 18.9 MMOL/L (ref 22–29)
CREAT BLD-MCNC: 6.17 MG/DL (ref 0.6–1.1)
DEPRECATED RDW RBC AUTO: 41.4 FL (ref 37–49)
EOSINOPHIL # BLD AUTO: 0.05 10*3/MM3 (ref 0–0.36)
EOSINOPHIL NFR BLD AUTO: 0.9 % (ref 1–5)
ERYTHROCYTE [DISTWIDTH] IN BLOOD BY AUTOMATED COUNT: 14.4 % (ref 11.7–14.5)
GFR SERPL CREATININE-BSD FRML MDRD: 7 ML/MIN/1.73
GFR SERPL CREATININE-BSD FRML MDRD: ABNORMAL ML/MIN/1.73
GLOBULIN UR ELPH-MCNC: 3.7 GM/DL (ref 1.8–3.5)
GLUCOSE BLD-MCNC: 136 MG/DL (ref 74–124)
HCT VFR BLD AUTO: 24.9 % (ref 34–45)
HGB BLD-MCNC: 7.7 G/DL (ref 11.5–14.9)
IMM GRANULOCYTES # BLD AUTO: 0.03 10*3/MM3 (ref 0–0.03)
IMM GRANULOCYTES NFR BLD AUTO: 0.5 % (ref 0–0.5)
LYMPHOCYTES # BLD AUTO: 0.66 10*3/MM3 (ref 1–3.5)
LYMPHOCYTES NFR BLD AUTO: 11.5 % (ref 20–49)
MCH RBC QN AUTO: 24.8 PG (ref 27–33)
MCHC RBC AUTO-ENTMCNC: 30.9 G/DL (ref 32–35)
MCV RBC AUTO: 80.1 FL (ref 83–97)
MONOCYTES # BLD AUTO: 0.6 10*3/MM3 (ref 0.25–0.8)
MONOCYTES NFR BLD AUTO: 10.5 % (ref 4–12)
NEUTROPHILS # BLD AUTO: 4.35 10*3/MM3 (ref 1.5–7)
NEUTROPHILS NFR BLD AUTO: 76.1 % (ref 39–75)
NRBC BLD AUTO-RTO: 0 /100 WBC (ref 0–0)
PLATELET # BLD AUTO: 243 10*3/MM3 (ref 150–375)
PMV BLD AUTO: 8.6 FL (ref 8.9–12.1)
POTASSIUM BLD-SCNC: 5 MMOL/L (ref 3.5–4.7)
PROT SERPL-MCNC: 7 G/DL (ref 6.3–8)
RBC # BLD AUTO: 3.11 10*6/MM3 (ref 3.9–5)
RH BLD: POSITIVE
SODIUM BLD-SCNC: 134 MMOL/L (ref 134–145)
T&S EXPIRATION DATE: NORMAL
UNIT  ABO: NORMAL
UNIT  ABO: NORMAL
UNIT  RH: NORMAL
UNIT  RH: NORMAL
WBC NRBC COR # BLD: 5.72 10*3/MM3 (ref 4–10)

## 2019-01-01 PROCEDURE — 36430 TRANSFUSION BLD/BLD COMPNT: CPT

## 2019-01-01 PROCEDURE — 86901 BLOOD TYPING SEROLOGIC RH(D): CPT | Performed by: INTERNAL MEDICINE

## 2019-01-01 PROCEDURE — 80053 COMPREHEN METABOLIC PANEL: CPT | Performed by: INTERNAL MEDICINE

## 2019-01-01 PROCEDURE — 86900 BLOOD TYPING SEROLOGIC ABO: CPT

## 2019-01-01 PROCEDURE — 63710000001 DIPHENHYDRAMINE PER 50 MG: Performed by: INTERNAL MEDICINE

## 2019-01-01 PROCEDURE — 36415 COLL VENOUS BLD VENIPUNCTURE: CPT | Performed by: INTERNAL MEDICINE

## 2019-01-01 PROCEDURE — 86900 BLOOD TYPING SEROLOGIC ABO: CPT | Performed by: INTERNAL MEDICINE

## 2019-01-01 PROCEDURE — 86850 RBC ANTIBODY SCREEN: CPT | Performed by: INTERNAL MEDICINE

## 2019-01-01 PROCEDURE — 99215 OFFICE O/P EST HI 40 MIN: CPT | Performed by: INTERNAL MEDICINE

## 2019-01-01 PROCEDURE — 86923 COMPATIBILITY TEST ELECTRIC: CPT

## 2019-01-01 PROCEDURE — 96523 IRRIG DRUG DELIVERY DEVICE: CPT | Performed by: INTERNAL MEDICINE

## 2019-01-01 PROCEDURE — P9016 RBC LEUKOCYTES REDUCED: HCPCS

## 2019-01-01 PROCEDURE — 36591 DRAW BLOOD OFF VENOUS DEVICE: CPT | Performed by: INTERNAL MEDICINE

## 2019-01-01 PROCEDURE — 85025 COMPLETE CBC W/AUTO DIFF WBC: CPT | Performed by: INTERNAL MEDICINE

## 2019-01-01 RX ORDER — HYDROCODONE BITARTRATE AND ACETAMINOPHEN 10; 325 MG/1; MG/1
TABLET ORAL
Qty: 180 TABLET | Refills: 0 | Status: SHIPPED | OUTPATIENT
Start: 2019-01-01

## 2019-01-01 RX ORDER — SODIUM CHLORIDE 0.9 % (FLUSH) 0.9 %
10 SYRINGE (ML) INJECTION AS NEEDED
Status: CANCELLED | OUTPATIENT
Start: 2019-01-01

## 2019-01-01 RX ORDER — ACETAMINOPHEN 325 MG/1
650 TABLET ORAL ONCE
Status: COMPLETED | OUTPATIENT
Start: 2019-01-01 | End: 2019-01-01

## 2019-01-01 RX ORDER — DIPHENHYDRAMINE HCL 25 MG
25 CAPSULE ORAL ONCE
Status: CANCELLED | OUTPATIENT
Start: 2019-01-01 | End: 2019-01-01

## 2019-01-01 RX ORDER — SODIUM CHLORIDE 9 MG/ML
250 INJECTION, SOLUTION INTRAVENOUS AS NEEDED
Status: CANCELLED | OUTPATIENT
Start: 2019-01-01

## 2019-01-01 RX ORDER — HEPARIN SODIUM (PORCINE) LOCK FLUSH IV SOLN 100 UNIT/ML 100 UNIT/ML
500 SOLUTION INTRAVENOUS AS NEEDED
OUTPATIENT
Start: 2019-01-01

## 2019-01-01 RX ORDER — SODIUM CHLORIDE 0.9 % (FLUSH) 0.9 %
10 SYRINGE (ML) INJECTION AS NEEDED
Status: DISCONTINUED | OUTPATIENT
Start: 2019-01-01 | End: 2019-01-01 | Stop reason: HOSPADM

## 2019-01-01 RX ORDER — ACETAMINOPHEN 325 MG/1
650 TABLET ORAL ONCE
Status: CANCELLED | OUTPATIENT
Start: 2019-01-01 | End: 2019-01-01

## 2019-01-01 RX ORDER — MORPHINE SULFATE 30 MG/1
30 TABLET, FILM COATED, EXTENDED RELEASE ORAL EVERY 12 HOURS SCHEDULED
Qty: 60 TABLET | Refills: 0 | Status: SHIPPED | OUTPATIENT
Start: 2019-01-01

## 2019-01-01 RX ORDER — SODIUM CHLORIDE 0.9 % (FLUSH) 0.9 %
10 SYRINGE (ML) INJECTION AS NEEDED
OUTPATIENT
Start: 2019-01-01

## 2019-01-01 RX ORDER — SODIUM CHLORIDE 9 MG/ML
250 INJECTION, SOLUTION INTRAVENOUS AS NEEDED
Status: DISCONTINUED | OUTPATIENT
Start: 2019-01-01 | End: 2019-01-01 | Stop reason: HOSPADM

## 2019-01-01 RX ORDER — DIPHENHYDRAMINE HCL 25 MG
25 CAPSULE ORAL ONCE
Status: COMPLETED | OUTPATIENT
Start: 2019-01-01 | End: 2019-01-01

## 2019-01-01 RX ADMIN — ACETAMINOPHEN 650 MG: 325 TABLET, FILM COATED ORAL at 08:12

## 2019-01-01 RX ADMIN — DIPHENHYDRAMINE HYDROCHLORIDE 25 MG: 25 CAPSULE ORAL at 08:13

## 2019-01-01 RX ADMIN — SODIUM CHLORIDE, PRESERVATIVE FREE 500 UNITS: 5 INJECTION INTRAVENOUS at 16:02

## 2019-01-01 RX ADMIN — Medication 500 UNITS: at 13:50

## 2019-01-01 RX ADMIN — Medication 10 ML: at 16:00

## 2019-01-17 NOTE — PROGRESS NOTES
Pt to infusion room for Type and Cross to be drawn  Port accessed per protocol and blood drawn per protocol , port flushed and   Deaccessed. Pt d/c'd home with daughter

## 2019-01-17 NOTE — PROGRESS NOTES
[facility] GROUP OUTPATIENT FOLLOW UP CLINIC VISIT    REASON FOR FOLLOW-UP:    Malignant neoplasm of rectum    Staging form: Colon and Rectum, AJCC V7      Pathologic: Stage IIIC (T4b, N1b, cM0) - Signed by Jude Stewart MD on 12/9/2016        Staging comments: Suspected lung metastases.        Clinical: Stage TRUDY (T4b, N1b, M1a) - Signed by Jude Stewart MD on 12/19/2016  Kras mutation positive.  BRAF negative.  Microsatellite stable.      HISTORY OF PRESENT ILLNESS:  Benigno Quispe is a 58 y.o. female who was previously treated for metastatic rectal cancer. She's on comfort care. We tried fentanyl recently but she didn't tolerate it well and thus went back to MS Contin.     Complaints today of frequent urination with mild burning. Poor sleep. Pain controlled with regimen. Complaints of enlarging rectal mass that causes pressure at some times.     Interval history: Patient's pain is still present and its more rectal pressure.  She has refused any kind of chemotherapy for her metastatic rectal cancer . she used  to see Dr. Paez.   Today she is feeling very weak.  Her hemoglobin is down to 7.7.  She denies any active bleeding.  Her creatinine has bumped up to 6.17.  I had discussed in length with the patient and patient's daughter.  The patient does not want any further treatment and we discussed about hospice care.  She now is willing for hospice after lengthy discussion about the poor prognosis.  She is willing to consider blood transfusion today because she feels weak and we will try to give her blood transition tomorrow and subsequently referred her to hospice      Reviewed, confirmed and updated history (past medical, social and family)   ALLERGIES:  Allergies   Allergen Reactions   • Adhesive Tape Rash       MEDICATIONS:  The medication list has been reviewed with the patient by the medical assistant, and the list has been updated in the electronic medical record, which I reviewed.  Medication dosages and  frequencies were confirmed to be accurate.    REVIEW OF SYSTEMS:  Review of Systems   Constitutional: Positive for fatigue. Negative for activity change, appetite change, chills, diaphoresis, fever and unexpected weight change.   HENT: Negative.    Eyes: Negative.    Respiratory: Positive for cough and wheezing. Negative for chest tightness and shortness of breath.         SOA and wheezing x1 week.   Cardiovascular: Negative for chest pain and leg swelling.   Gastrointestinal: Positive for rectal pain. Negative for abdominal distention, constipation, nausea and vomiting.   Endocrine: Negative.    Genitourinary: Positive for vaginal discharge. Negative for dyspareunia, dysuria, enuresis, hematuria and vaginal bleeding.   Musculoskeletal: Positive for arthralgias. Negative for joint swelling and myalgias.   Skin: Negative.    Allergic/Immunologic: Negative.    Neurological: Negative for light-headedness.   Hematological: Positive for adenopathy. Does not bruise/bleed easily.   Psychiatric/Behavioral: Positive for sleep disturbance. Negative for agitation, confusion and dysphoric mood. The patient is nervous/anxious.    Patient's review of systems are as above.  She is very weak    Objective     Vitals:    01/17/19 1614   BP: 159/84   Pulse: 110   Resp: 20   Temp: 97.9 °F (36.6 °C)   SpO2: 90%      Physical examination:    GENERAL:  Patient appears very uncomfortable and in pain  NECK:  Supple with good range of motion; no thyromegaly or masses, no JVD.  LYMPHATICS:  No cervical, supraclavicular, axillary or inguinal adenopathy.  CHEST:  Lungs clear to auscultation. Good airflow.  Mediport in place  CARDIAC:  Regular rate and rhythm without murmurs, rubs or gallops. Normal S1,S2.  ABDOMEN:  Soft, nontender with no hepatosplenomegaly or masses.  EXTREMITIES:  No clubbing, cyanosis or edema.  NEUROLOGICAL:  Cranial Nerves II-XII grossly intact.  No focal neurological deficits.  PSYCHIATRIC:  Normal affect and mood.       DIAGNOSTIC DATA:  None to review    Assessment/Plan   ASSESSMENT/PLAN:  This is a 58 y.o. female with:  1.  Metastatic rectal cancer, Kras mutated: She had a resection on 10/31/2016 by [facility] and [place].  Unfortunately, PET scan shows evidence for local lydia metastases as well as bilateral pulmonary metastases.    - Palliative FOLFIRI/Avastin started 2/10/17. Multiple dose reductions due to cytopenias. Stopped 5/2017 due to disease progression   - FOLFOX with Avastin initiated 5/23/2017. Patient self stopped 10/30/17 and was lost to follow up.    - Returned to clinic 2/19/18 with progressive metastatic disease with large local, painful recurrence and multiple pulmonary metastasis. Declined systemic therapy but agreed to radiation therapy; pain greatly improved after RT.   - Imaging in ER 7/2018 with progressive disease causing pain. Patient desired comfort care but declined hospice. I am managing symptoms.    - Symptoms from her rectal cancer seem to be progressing locally and I suspect she will need hospice soon.  · I had a lengthy discussion with patient and patient's family her daughter.  Currently patient is significantly weak and in pain.  Her current pain medications seems to help.  She is now willing to consider hospice.  I told the patient's family that her hemoglobin is very low at 7.7 and her creatinine has bumped up to 6.17.   · Given that she has such poor prognosis and the fact that patient does not want any kind of active treatment for her underlying disease or even scans we discussed about consideration of hospice and keeping her comfortable.  Patient's daughter understands that and patient also is agreeable for hospice now.    2. Cancer related rectal pain, uncontrolled   - Continue MS Contin 30 mg q 12 hours.    - Stopped fentanyl - didn't tolerate.     - Continue Norco 10/325, 1-2 tabs every 6 hours prn pain   - Senna S 2 tabs nightly    - Use heating pad to left groin; can add lidocaine  patch as well - improved as of visit 10/2018.    3.  Anxiety.  Previously on Celexa and Ativan the patient self stopped.  We will continue her Klonopin which seems to help.  4.  Mild right hydronephrosis. If progresses or has difficulty urinating, we could consider stent for palliation. Continues to have normal urination  5. Insomnia. Add Ambien 5 mg nightly. Can increase to 10 mg if needed but will call and notify us if she needs higher dose   6.  Patient refuses chemotherapy and refuses hospice at this time.    7.  Control patient has been on MS Contin 30 mg every 12 hours and hydrocodone and we will continue that      PLAN:  1. Continue comfort care (declines Hospice - may be interested in future); she is starting to have more localized symptoms likely secondary to disease progression.   2. MS Contin 30 mg q 12 hours, Norco prn.  - Continue  3.  Lidocaine patch.   4. Continue Klonopin  5. Transfused 2 units of packed red blood cells tomorrow  6. Will consult hospice to see patient tomorrow.  I had a lengthy discussion more than 45 minutes to an hour with the patient and the family discussing about hospice.    Luz Gusman MD.

## 2019-01-18 NOTE — TELEPHONE ENCOUNTER
----- Message from Yuli Che sent at 1/18/2019 10:18 AM EST -----  Contact: [phone]  Sofia with hospice is calling with an update.  They are going to go and see the patient today at 5:30      Called and spoke with hospice. She just wanted to let us know when they would be seeing her.

## 2019-01-18 NOTE — TELEPHONE ENCOUNTER
REC'D ORDER PER DR. LEI TO GET HOSPICE FOR THE PT. CALLED HOSPICE AND S/W ANA. GAVE HER PT'S INFO AND REQUESTED THAT THEY SEE HER PRIOR TO THE WKEND D/T PT'S POOR PROGNOSIS. SHE V/U. ALL NECESSARY PAPERWORK FILLED OUT AND DR. LEI TO SIGN. WILL FAX EVERYTHING OVER TO HOSPICE WHEN I REC IT. ALSO CALLED PT'S DGT AND MADE HER AWARE THAT WE HAD NOTIFIED HOSPICE AND SHE V/U.

## 2019-01-18 NOTE — TELEPHONE ENCOUNTER
----- Message from Luz Gusman MD sent at 1/17/2019  5:26 PM EST -----  Can you please consult hospice for this patient as soon as possible so they can see her prior to the weekend.  She has metastatic rectal cancer and has refused all treatment and is very poor prognosis less than 3 months.  Please requesting to see the patient tomorrow.  Patient is also getting blood transfusion tomorrow and hopefully we can call her and see her at the time she is at home.  Luz Gusman MD

## 2019-01-18 NOTE — PROGRESS NOTES
Ate 3/4 of lunch. Has been up to bathroom several times.  Discharged home ambulatory with daughter. Patient speaks and understands English and refused an .

## 2019-01-18 NOTE — PATIENT INSTRUCTIONS
Blood Transfusion, Adult  A blood transfusion is a procedure in which you receive donated blood, including plasma, platelets, and red blood cells, through an IV tube. You may need a blood transfusion because of illness, surgery, or injury. The blood may come from a donor. You may also be able to donate blood for yourself (autologous blood donation) before a surgery if you know that you might require a blood transfusion.  The blood given in a transfusion is made up of different types of cells. You may receive:  · Red blood cells. These carry oxygen to the cells in the body.  · White blood cells. These help you fight infections.  · Platelets. These help your blood to clot.  · Plasma. This is the liquid part of your blood and it helps with fluid imbalances.    If you have hemophilia or another clotting disorder, you may also receive other types of blood products.  Tell a health care provider about:  · Any allergies you have.  · All medicines you are taking, including vitamins, herbs, eye drops, creams, and over-the-counter medicines.  · Any problems you or family members have had with anesthetic medicines.  · Any blood disorders you have.  · Any surgeries you have had.  · Any medical conditions you have, including any recent fever or cold symptoms.  · Whether you are pregnant or may be pregnant.  · Any previous reactions you have had during a blood transfusion.  What are the risks?  Generally, this is a safe procedure. However, problems may occur, including:  · Having an allergic reaction to something in the donated blood. Hives and itching may be symptoms of this type of reaction.  · Fever. This may be a reaction to the white blood cells in the transfused blood. Nausea or chest pain may accompany a fever.  · Iron overload. This can happen from having many transfusions.  · Transfusion-related acute lung injury (TRALI). This is a rare reaction that causes lung damage. The cause is not known. TRALI can occur within hours  of a transfusion or several days later.  · Sudden (acute) or delayed hemolytic reactions. This happens if your blood does not match the cells in your transfusion. Your body’s defense system (immune system) may try to attack the new cells. This complication is rare. The symptoms include fever, chills, nausea, and low back pain or chest pain.  · Infection or disease transmission. This is rare.    What happens before the procedure?  · You will have a blood test to determine your blood type. This is necessary to know what kind of blood your body will accept and to match it to the donor blood.  · If you are going to have a planned surgery, you may be able to do an autologous blood donation. This may be done in case you need to have a transfusion.  · If you have had an allergic reaction to a transfusion in the past, you may be given medicine to help prevent a reaction. This medicine may be given to you by mouth or through an IV tube.  · You will have your temperature, blood pressure, and pulse monitored before the transfusion.  · Follow instructions from your health care provider about eating and drinking restrictions.  · Ask your health care provider about:  ? Changing or stopping your regular medicines. This is especially important if you are taking diabetes medicines or blood thinners.  ? Taking medicines such as aspirin and ibuprofen. These medicines can thin your blood. Do not take these medicines before your procedure if your health care provider instructs you not to.  What happens during the procedure?  · An IV tube will be inserted into one of your veins.  · The bag of donated blood will be attached to your IV tube. The blood will then enter through your vein.  · Your temperature, blood pressure, and pulse will be monitored regularly during the transfusion. This monitoring is done to detect early signs of a transfusion reaction.  · If you have any signs or symptoms of a reaction, your transfusion will be stopped  and you may be given medicine.  · When the transfusion is complete, your IV tube will be removed.  · Pressure may be applied to the IV site for a few minutes.  · A bandage (dressing) will be applied.  The procedure may vary among health care providers and hospitals.  What happens after the procedure?  · Your temperature, blood pressure, heart rate, breathing rate, and blood oxygen level will be monitored often.  · Your blood may be tested to see how you are responding to the transfusion.  · You may be warmed with fluids or blankets to maintain a normal body temperature.  Summary  · A blood transfusion is a procedure in which you receive donated blood, including plasma, platelets, and red blood cells, through an IV tube.  · Your temperature, blood pressure, and pulse will be monitored before, during, and after the transfusion.  · Your blood may be tested after the transfusion to see how your body has responded.  This information is not intended to replace advice given to you by your health care provider. Make sure you discuss any questions you have with your health care provider.  Document Released: 12/15/2001 Document Revised: 09/14/2017 Document Reviewed: 09/14/2017  [employer] Interactive Patient Education © 2018 [employer]

## 2022-11-04 NOTE — PROGRESS NOTES
Subjective     PATIENT NAME:  Benigno Quispe  YOB: 1960  PATIENTS AGE:  56 y.o.  PATIENTS SEX:  female  DATE OF SERVICE:  01/18/2017  PROVIDER:  GORGE Anthony      ____________________PATIENT EDUCATION____________________  **Chemotherapy education with an , as the patient's English is limited.  Education material provided in English, as the patient's son and daughter do speak English fluently and read english.  Seeing as consent is in English, the patient's son did sign consent at the patient's request.**    PATIENT EDUCATION:  Today I met with the patient to discuss the chemotherapy regimen recommended for treatment of her disease.  The patient was given explanation of treatment premed side effects including office policy that prohibits patients to drive if sedating medications are administered, MD explanation given regarding benefits, side effects, toxicities and goals of treatment.  The patient received a Chemotherapy/Biotherapy Plan Summary including diagnosis and specific treatment plan.    SIDE EFFECTS:  Common side effects were discussed with the patient, , and son.  Discussion included hair loss/discoloration, anemia/fatigue, infection/chills/fever, appetite, bleeding risk/precautions, constipation, diarrhea, mouth sores, taste alteration, loss of appetite,nausea/vomiting, skin/nail changes, rash, muscle aches/weakness, photosensitivity, weight gain/loss, high blood pressure, liver damage, kidney damage, DVT/PE risk, fluid retention, pleural/pericardial effusion, somnolence, electrolyte/LFT imbalance, vein exercises and/or the possible need for vascular access/port placement.  The patient was advice that although uncommon, leakage of an infused medication from the vein or venous access device (port) may lead to skin breakdown and/or other tissue damage.  The patient was advised that he/she may have pain, bleeding, and/or bruising from the insertion of a  needle in their vein or venous access device (port).  The patient was further advised that, in spite of proper technique, infection with redness and irritation may rarely occur at the site where the needle was inserted.  The patient was advised that if complications occur, additional medical treatment is available.    Discussion also included side effects specific to drugs in the treatment plan, specifically Flouruoracil, leucovorin, irinotecan, and Avastin    A total of 45 minutes were spent with the patient, with 100% of time spent in education and counseling.      Latesha Monroe, APRN  01/18/2017     Yes

## 2023-07-31 NOTE — PROGRESS NOTES
[facility] CBC GROUP OUTPATIENT FOLLOW UP CLINIC VISIT    REASON FOR FOLLOW-UP:    Malignant neoplasm of rectum    Staging form: Colon and Rectum, AJCC V7      Pathologic: Stage IIIC (T4b, N1b, cM0) - Signed by Jude Stewart MD on 12/9/2016        Staging comments: Suspected lung metastases.        Clinical: Stage TRUDY (T4b, N1b, M1a) - Signed by Jude Stewart MD on 12/19/2016  Kras mutation positive.  BRAF negative.  microsatellite stable.      HISTORY OF PRESENT ILLNESS:  Benigno Quispe is a 56 y.o. female who returns today for follow up of the above issue.  I spoke with her today again with the assistance of a [place] .  After she was last seen here, she developed worsening fevers.  She was admitted to [facility] by Dr. Clemente and felt to have colitis.  She was treated with metronidazole with improvement.  Loose ostomy output improved.  She states that she is feeling well.  On 1/11/2017, 2 days ago, she had a left subclavian Mediport placed by Dr. Geo Lovett Junior.  She notes some pruritus and tenderness at the operative site.  She states that 2 hydrocodone 5/325 mg tablets works well for her pain.  No fevers or chills.  She denies much abdominal pain.       ONCOLOGIC HISTORY:     Malignant neoplasm of rectum    10/6/2016 Biopsy    Upper and lower endoscopy by Dr. Keller:  Rectal mass showing invasive moderately differentiated adenocarcinoma.        10/12/2016 Imaging    CT imaging showed bilateral pulmonary nodules with the largest in the left lower lobe measuring 11 mm, concerning for pulmonary metastases. A mass in the pelvis measured about 4 x 3 cm with a 15 mm lymph node in the right pelvis.      10/31/2016 Surgery    APR with posterior vaginectomy by Brie Clemente and Marisa Burris. Pathology showed a 5.5 cm low-grade well to moderately differentiated adenocarcinoma with 2 of 20 lymph nodes positive for metastatic disease and a positive radial margin.      10/31/2016  "Imaging    PET scan:  Multiple bilateral hypermetabolic pulmonary nodules likely  represent metastases. The hypermetabolic nodes along both pelvic  sidewalls are suspected to be metastatic. The activity along the anal  canal and the activity within shotty bilateral groin nodes is  indeterminate.         PAST MEDICAL, SURGICAL, FAMILY, AND SOCIAL HISTORIES were reviewed with the patient and in the electronic medical record, and were updated if indicated.    ALLERGIES:  No Known Allergies    MEDICATIONS:  The medication list has been reviewed with the patient by the medical assistant, and the list has been updated in the electronic medical record, which I reviewed.  Medication dosages and frequencies were confirmed to be accurate.    REVIEW OF SYSTEMS:  PAIN:  See Vital Signs below.  GENERAL:  No fevers, chills, night sweats, or unintended weight loss.  SKIN:  Pruritus at the Mediport operative site.  HEME/LYMPH:  No abnormal bleeding.  No palpable lymphadenopathy.  EYES:  No vision changes or diplopia.  ENT:  No sore throat or difficulty swallowing.  RESPIRATORY:  No cough, shortness of breath, hemoptysis, or wheezing.  CARDIOVASCULAR:  No chest pain, palpitations, orthopnea, or dyspnea on exertion.  GASTROINTESTINAL:  No abdominal pain.  Functioning ostomy.  GENITOURINARY:  No dysuria or hematuria.  MUSCULOSKELETAL:  No joint pain, swelling, or erythema.  NEUROLOGIC:  No dizziness, loss of consciousness, or seizures.  PSYCHIATRIC:  No depression, anxiety, or mood changes.    Vitals:    01/13/17 0835   BP: 112/80   Pulse: 72   Resp: 16   Temp: 98.8 °F (37.1 °C)   Weight: 160 lb 3.2 oz (72.7 kg)   Height: 63.78\" (162 cm)   PainSc: 6  Comment: port area took pain med. this morning       PHYSICAL EXAMINATION:  GENERAL:  Well-developed well-nourished female; awake, alert and oriented, in no acute distress.  SKIN:  Warm and dry.  Some erythema underneath where the tape was over the Mediport insertion site.  This appears " to be consistent with a reaction to the adhesive and not an infection.  HEAD:  Normocephalic, atraumatic.  EYES:  Pupils equal, round and reactive to light.  Extraocular movements intact.  Conjunctivae normal.  EARS:  Hearing intact.  NOSE:  Septum midline.  No excoriations or nasal discharge.  MOUTH:  No stomatitis or ulcers.  Lips are normal.  THROAT:  Oropharynx without lesions or exudates.  NECK:  Supple with good range of motion; no thyromegaly or masses; no JVD or bruits.  LYMPHATICS:  No cervical, supraclavicular, axillary, or inguinal lymphadenopathy.  CHEST:  Lungs are clear to auscultation bilaterally.  No wheezes, rales, or rhonchi.  HEART:  Regular rate; normal rhythm.  No murmurs, gallops or rubs.  ABDOMEN:  Soft.  Functioning ostomy.  EXTREMITIES:  No clubbing, cyanosis, or edema.  NEUROLOGICAL:  No focal neurologic deficits.    DIAGNOSTIC DATA:  Lab Results   Component Value Date    WBC 4.64 01/13/2017    HGB 11.6 01/13/2017    HCT 36.9 01/13/2017    MCV 80.9 (L) 01/13/2017     01/13/2017       IMAGING: None reviewed    ASSESSMENT:  This is a 56 y.o. female with:  1.  Metastatic rectal cancer, Kras mutated: She had a resection on 10/31/2016 by Brie Clemente and [place].  Unfortunately, PET scan shows evidence for local lydia metastases as well as bilateral pulmonary metastases.  I again explained this to her today.  We have planned palliative chemotherapy with FOLFIRI/Avastin.  She missed her chemotherapy education session as she was in the hospital, so we will reschedule this.  We had planned to start chemotherapy today, but she wishes to delay this.  We will start in the next couple of weeks, first with FOLFIRI and we will add Avastin at a later date.    2.  Microcytic anemia: Improved after two units of pRBCs in the hospital.  Ferritin was normal; percent iron saturation was low.  We will continue to monitor.    3.  Recent fever with infection and colitis: Resolved  4.  Tenderness at the  Mediport insertion site: There is no sign of infection at this time.  I did refill a prescription for hydrocodone/acetaminophen 5/325 mg 2 tablets every 6 hours as needed #60.  I advised her that she can only received medication from our office.  5.  Language barrier: She does requests the  who is with her today at each visit.  I advised that this is out of her control.  She does have children who speak English better than she does.  Her son is actually here today but for some reason is in the waiting room in spite of the fact that he is aware of everything is going on with her medically and did call with questions after his mother's last visit.  I advised that it is much easier for him to be in the exam room for the conversations if she is comfortable with this.  She voiced understanding.      PLAN:  1.  She prefers not to start chemotherapy today.  Therefore, we will reschedule her chemotherapy to start in the next couple of weeks.  All treatment is palliative.  2.  Chemotherapy education session for FOLFIRI/Avastin.    3.  Hold Avastin with her first cycle of chemotherapy.  4.  I refilled the hydrocodone/acetaminophen prescription as noted above.  5.  I will see her back for cycle #2 of therapy.  6.  CT imaging after 4 cycles.   Qbrexza Counseling:  I discussed with the patient the risks of Qbrexza including but not limited to headache, mydriasis, blurred vision, dry eyes, nasal dryness, dry mouth, dry throat, dry skin, urinary hesitation, and constipation.  Local skin reactions including erythema, burning, stinging, and itching can also occur.